# Patient Record
Sex: FEMALE | Race: WHITE | NOT HISPANIC OR LATINO | Employment: OTHER | URBAN - METROPOLITAN AREA
[De-identification: names, ages, dates, MRNs, and addresses within clinical notes are randomized per-mention and may not be internally consistent; named-entity substitution may affect disease eponyms.]

---

## 2017-04-27 RX ORDER — LEVOTHYROXINE SODIUM 25 MCG
TABLET ORAL
Qty: 30 TABLET | Refills: 3 | Status: SHIPPED | OUTPATIENT
Start: 2017-04-27 | End: 2017-09-08 | Stop reason: SDUPTHER

## 2017-05-23 ENCOUNTER — TELEPHONE (OUTPATIENT)
Dept: OBSTETRICS AND GYNECOLOGY | Age: 62
End: 2017-05-23

## 2017-05-23 ENCOUNTER — TRANSCRIBE ORDERS (OUTPATIENT)
Dept: OBSTETRICS AND GYNECOLOGY | Age: 62
End: 2017-05-23

## 2017-05-23 DIAGNOSIS — Z13.9 SCREENING: Primary | ICD-10-CM

## 2017-05-23 DIAGNOSIS — Z12.31 VISIT FOR SCREENING MAMMOGRAM: Primary | ICD-10-CM

## 2017-06-20 ENCOUNTER — HOSPITAL ENCOUNTER (OUTPATIENT)
Dept: MAMMOGRAPHY | Facility: HOSPITAL | Age: 62
Discharge: HOME | End: 2017-06-20
Attending: OBSTETRICS & GYNECOLOGY

## 2017-06-27 ENCOUNTER — HOSPITAL ENCOUNTER (OUTPATIENT)
Dept: MAMMOGRAPHY | Facility: HOSPITAL | Age: 62
Discharge: HOME | End: 2017-06-27
Attending: OBSTETRICS & GYNECOLOGY | Admitting: OBSTETRICS & GYNECOLOGY

## 2017-06-27 DIAGNOSIS — Z12.31 VISIT FOR SCREENING MAMMOGRAM: ICD-10-CM

## 2017-06-27 PROCEDURE — G0202 SCR MAMMO BI INCL CAD: HCPCS

## 2017-09-08 DIAGNOSIS — R74.8 ELEVATED LIVER ENZYMES: ICD-10-CM

## 2017-09-08 DIAGNOSIS — E03.9 ADULT HYPOTHYROIDISM: ICD-10-CM

## 2017-09-08 DIAGNOSIS — Z00.00 HEALTH CARE MAINTENANCE: Primary | ICD-10-CM

## 2017-09-08 RX ORDER — LEVOTHYROXINE SODIUM 25 MCG
TABLET ORAL
Qty: 30 TABLET | Refills: 3 | Status: SHIPPED | OUTPATIENT
Start: 2017-09-08 | End: 2017-09-18

## 2017-09-15 LAB
25(OH)D3+25(OH)D2 SERPL-MCNC: 54.6 NG/ML (ref 30–100)
ALBUMIN SERPL-MCNC: 4.5 G/DL (ref 3.5–5.2)
ALBUMIN/GLOB SERPL: 1.5 G/DL
ALP SERPL-CCNC: 76 U/L (ref 39–117)
ALT SERPL-CCNC: 22 U/L (ref 1–33)
APPEARANCE UR: CLEAR
AST SERPL-CCNC: 24 U/L (ref 1–32)
BACTERIA #/AREA URNS HPF: ABNORMAL /HPF
BACTERIA UR CULT: ABNORMAL
BACTERIA UR CULT: ABNORMAL
BASOPHILS # BLD AUTO: 0.02 10*3/MM3 (ref 0–0.2)
BASOPHILS NFR BLD AUTO: 0.4 % (ref 0–1.5)
BILIRUB SERPL-MCNC: 0.8 MG/DL (ref 0.1–1.2)
BILIRUB UR QL STRIP: NEGATIVE
BUN SERPL-MCNC: 20 MG/DL (ref 8–23)
BUN/CREAT SERPL: 26 (ref 7–25)
CALCIUM SERPL-MCNC: 10.3 MG/DL (ref 8.6–10.5)
CHLORIDE SERPL-SCNC: 99 MMOL/L (ref 98–107)
CHOLEST SERPL-MCNC: 238 MG/DL (ref 0–200)
CO2 SERPL-SCNC: 28.3 MMOL/L (ref 22–29)
COLOR UR: YELLOW
CREAT SERPL-MCNC: 0.77 MG/DL (ref 0.57–1)
CRYSTALS URNS MICRO: ABNORMAL
EOSINOPHIL # BLD AUTO: 0.18 10*3/MM3 (ref 0–0.7)
EOSINOPHIL NFR BLD AUTO: 3.4 % (ref 0.3–6.2)
EPI CELLS #/AREA URNS HPF: ABNORMAL /HPF
ERYTHROCYTE [DISTWIDTH] IN BLOOD BY AUTOMATED COUNT: 13.9 % (ref 11.7–13)
GLOBULIN SER CALC-MCNC: 3.1 GM/DL
GLUCOSE SERPL-MCNC: 99 MG/DL (ref 65–99)
GLUCOSE UR QL: NEGATIVE
HBA1C MFR BLD: 5 % (ref 4.8–5.6)
HCT VFR BLD AUTO: 43.8 % (ref 35.6–45.5)
HDLC SERPL-MCNC: 84 MG/DL (ref 40–60)
HGB BLD-MCNC: 14.1 G/DL (ref 11.9–15.5)
HGB UR QL STRIP: ABNORMAL
IMM GRANULOCYTES # BLD: 0 10*3/MM3 (ref 0–0.03)
IMM GRANULOCYTES NFR BLD: 0 % (ref 0–0.5)
KETONES UR QL STRIP: NEGATIVE
LDLC SERPL CALC-MCNC: 135 MG/DL (ref 0–100)
LEUKOCYTE ESTERASE UR QL STRIP: ABNORMAL
LYMPHOCYTES # BLD AUTO: 1.86 10*3/MM3 (ref 0.9–4.8)
LYMPHOCYTES NFR BLD AUTO: 35 % (ref 19.6–45.3)
MCH RBC QN AUTO: 30.4 PG (ref 26.9–32)
MCHC RBC AUTO-ENTMCNC: 32.2 G/DL (ref 32.4–36.3)
MCV RBC AUTO: 94.4 FL (ref 80.5–98.2)
MICRO URNS: ABNORMAL
MONOCYTES # BLD AUTO: 0.48 10*3/MM3 (ref 0.2–1.2)
MONOCYTES NFR BLD AUTO: 9 % (ref 5–12)
MUCOUS THREADS URNS QL MICRO: PRESENT /HPF
NEUTROPHILS # BLD AUTO: 2.77 10*3/MM3 (ref 1.9–8.1)
NEUTROPHILS NFR BLD AUTO: 52.2 % (ref 42.7–76)
NITRITE UR QL STRIP: NEGATIVE
OTHER ANTIBIOTIC SUSC ISLT: ABNORMAL
PH UR STRIP: 6 [PH] (ref 5–7.5)
PLATELET # BLD AUTO: 208 10*3/MM3 (ref 140–500)
POTASSIUM SERPL-SCNC: 4.3 MMOL/L (ref 3.5–5.2)
PROT SERPL-MCNC: 7.6 G/DL (ref 6–8.5)
PROT UR QL STRIP: NEGATIVE
RBC # BLD AUTO: 4.64 10*6/MM3 (ref 3.9–5.2)
RBC #/AREA URNS HPF: ABNORMAL /HPF
SODIUM SERPL-SCNC: 141 MMOL/L (ref 136–145)
SP GR UR: 1.02 (ref 1–1.03)
T4 FREE SERPL-MCNC: 1.28 NG/DL (ref 0.93–1.7)
TRIGL SERPL-MCNC: 97 MG/DL (ref 0–150)
TSH SERPL DL<=0.005 MIU/L-ACNC: 5.66 MIU/ML (ref 0.27–4.2)
UNIDENT CRYS URNS QL MICRO: PRESENT /LPF
URINALYSIS REFLEX: ABNORMAL
UROBILINOGEN UR STRIP-MCNC: 0.2 MG/DL (ref 0.2–1)
VLDLC SERPL CALC-MCNC: 19.4 MG/DL (ref 5–40)
WBC # BLD AUTO: 5.31 10*3/MM3 (ref 4.5–10.7)
WBC #/AREA URNS HPF: ABNORMAL /HPF

## 2017-09-18 ENCOUNTER — OFFICE VISIT (OUTPATIENT)
Dept: INTERNAL MEDICINE | Facility: CLINIC | Age: 62
End: 2017-09-18

## 2017-09-18 VITALS
DIASTOLIC BLOOD PRESSURE: 72 MMHG | HEIGHT: 68 IN | RESPIRATION RATE: 18 BRPM | WEIGHT: 155 LBS | SYSTOLIC BLOOD PRESSURE: 118 MMHG | HEART RATE: 69 BPM | OXYGEN SATURATION: 98 % | BODY MASS INDEX: 23.49 KG/M2

## 2017-09-18 DIAGNOSIS — Z00.00 HEALTH MAINTENANCE EXAMINATION: Primary | ICD-10-CM

## 2017-09-18 DIAGNOSIS — E03.9 ADULT HYPOTHYROIDISM: ICD-10-CM

## 2017-09-18 DIAGNOSIS — Z23 NEED FOR INFLUENZA VACCINATION: ICD-10-CM

## 2017-09-18 PROCEDURE — 90471 IMMUNIZATION ADMIN: CPT | Performed by: INTERNAL MEDICINE

## 2017-09-18 PROCEDURE — 90656 IIV3 VACC NO PRSV 0.5 ML IM: CPT | Performed by: INTERNAL MEDICINE

## 2017-09-18 PROCEDURE — 99396 PREV VISIT EST AGE 40-64: CPT | Performed by: INTERNAL MEDICINE

## 2017-09-18 RX ORDER — LEVOTHYROXINE SODIUM 50 MCG
50 TABLET ORAL DAILY
Qty: 90 TABLET | Refills: 3 | Status: SHIPPED | OUTPATIENT
Start: 2017-09-18 | End: 2018-09-30 | Stop reason: SDUPTHER

## 2017-09-18 NOTE — PROGRESS NOTES
"Chief Complaint  Charleen Rivera is a 62 y.o. female who presents for Annual Exam (CPE)  .    History of Present Illness   Charleen is here for routine CPE.  No new concerns.  She has been watching her three grandkids a lot.  She is definitely a little fatigued after some of the longer days but overall feels very good.    Mammo: 6/2017  Pap:10/2017 Dr. Pablo  DEXA: Dr. Pablo has done these  C-scope:6/2017    Exercise: \"not enough\"  She needs to do more    Immunization History   Administered Date(s) Administered   • Flu Vaccine Quad PF >18YRS 10/10/2016, 09/18/2017   • Tdap 06/01/2010   • Zoster 09/12/2016       Review of Systems   Constitution: Negative for chills, fever and malaise/fatigue.   HENT: Negative for headaches, hearing loss, hoarse voice and sore throat.    Eyes: Negative for blurred vision, double vision and visual disturbance.   Cardiovascular: Negative for chest pain, leg swelling and palpitations.   Respiratory: Negative for cough and shortness of breath.    Endocrine: Negative for polydipsia and polyuria.   Hematologic/Lymphatic: Does not bruise/bleed easily.   Skin: Negative for rash and suspicious lesions.   Musculoskeletal: Negative for arthritis and myalgias.   Gastrointestinal: Negative for abdominal pain, change in bowel habit, constipation, diarrhea, dysphagia, hematochezia, melena, nausea and vomiting.   Genitourinary: Negative for dysuria and hematuria.   Neurological: Negative for dizziness and light-headedness.   Psychiatric/Behavioral: Negative for depression. The patient is not nervous/anxious.        Patient Active Problem List   Diagnosis   • Adult hypothyroidism   • Airway hyperreactivity   • Abnormal finding on mammography   • Elevated liver enzymes       Past Medical History:   Diagnosis Date   • Asthma    • Cancer    • Carcinoma in situ of cervix     IN 1977   • Pap smear abnormality of cervix with ASCUS favoring benign     2007   • PMB (postmenopausal bleeding)    • Thyroid disease  " "      Past Surgical History:   Procedure Laterality Date   •  SECTION     • CHOLECYSTECTOMY     • COLONOSCOPY N/A 2016    Procedure: COLONOSCOPY TO CECUM;  Surgeon: Aisha Partida MD;  Location: [facility] ENDOSCOPY;  Service:    • DILATATION AND CURETTAGE     • DILATION AND CURETTAGE, DIAGNOSTIC / THERAPEUTIC      endometrial hyperplasia   • TONSILLECTOMY         Family History   Problem Relation Age of Onset   • Hypertension Mother    • Thyroid disease Mother    • Osteoporosis Mother    • Alcohol abuse Father    • Cancer Maternal Grandmother      breast cancer   • Cancer Cousin      breast cancer - two cousins       Social History     Social History   • Marital status:      Spouse name: N/A   • Number of children: 2   • Years of education: N/A     Occupational History   • retired principal      Social History Main Topics   • Smoking status: Never Smoker   • Smokeless tobacco: Never Used   • Alcohol use Yes      Comment: socially   • Drug use: No   • Sexual activity: Not on file     Other Topics Concern   • Not on file     Social History Narrative       Current Outpatient Prescriptions on File Prior to Visit   Medication Sig Dispense Refill   • Calcium-Phosphorus-Vitamin D (CITRACAL +D3) 250-107-500 MG-MG-UNIT chewable tablet Chew.     • Cholecalciferol (VITAMIN D) 1000 UNITS tablet Take  by mouth.     • [DISCONTINUED] SYNTHROID 25 MCG tablet TAKE 1 TABLET BY MOUTH EVERY MORNING 30 tablet 3     No current facility-administered medications on file prior to visit.        No Known Allergies    Vitals:    17 0829   BP: 118/72   Pulse: 69   Resp: 18   SpO2: 98%   Weight: 155 lb (70.3 kg)   Height: 68\" (172.7 cm)       Body mass index is 23.57 kg/(m^2).    Objective   Physical Exam   Constitutional: She is oriented to person, place, and time. She appears well-developed and well-nourished. No distress.   HENT:   Head: Normocephalic and atraumatic.   Nose: Nose normal.   Mouth/Throat: Oropharynx is " clear and moist.   Eyes: Conjunctivae and EOM are normal. Pupils are equal, round, and reactive to light. No scleral icterus.   Neck: Normal range of motion. Neck supple. No thyromegaly present.   Cardiovascular: Normal rate, regular rhythm and normal heart sounds.  Exam reveals no gallop and no friction rub.    No murmur heard.  Pulses:       Carotid pulses are 2+ on the right side, and 2+ on the left side.       Femoral pulses are 2+ on the right side, and 2+ on the left side.       Dorsalis pedis pulses are 2+ on the right side, and 2+ on the left side.        Posterior tibial pulses are 2+ on the right side, and 2+ on the left side.   Pulmonary/Chest: Effort normal and breath sounds normal. No respiratory distress. She has no wheezes. She has no rales. Right breast exhibits no mass and no nipple discharge. Left breast exhibits no mass and no nipple discharge.   Abdominal: Soft. Bowel sounds are normal. She exhibits no distension and no mass. There is no tenderness.   Musculoskeletal: Normal range of motion. She exhibits no edema.       Vascular Status -  Her exam exhibits no right foot edema. Her exam exhibits no left foot edema.   Skin Integrity  -  Her right foot skin is intact.     Charleen 's left foot skin is intact. .  Lymphadenopathy:     She has no cervical adenopathy.     She has no axillary adenopathy.        Right: No inguinal and no supraclavicular adenopathy present.        Left: No inguinal and no supraclavicular adenopathy present.   Neurological: She is alert and oriented to person, place, and time. She has normal reflexes. No cranial nerve deficit.   Skin: Skin is warm and dry.   Psychiatric: She has a normal mood and affect. Her speech is normal and behavior is normal. Judgment and thought content normal. Cognition and memory are normal.   Vitals reviewed.        Results for orders placed or performed in visit on 09/08/17   Comprehensive Metabolic Panel   Result Value Ref Range    Glucose 99 65 - 99  mg/dL    BUN 20 8 - 23 mg/dL    Creatinine 0.77 0.57 - 1.00 mg/dL    eGFR Non African Am 76 >60 mL/min/1.73    eGFR African Am 92 >60 mL/min/1.73    BUN/Creatinine Ratio 26.0 (H) 7.0 - 25.0    Sodium 141 136 - 145 mmol/L    Potassium 4.3 3.5 - 5.2 mmol/L    Chloride 99 98 - 107 mmol/L    Total CO2 28.3 22.0 - 29.0 mmol/L    Calcium 10.3 8.6 - 10.5 mg/dL    Total Protein 7.6 6.0 - 8.5 g/dL    Albumin 4.50 3.50 - 5.20 g/dL    Globulin 3.1 gm/dL    A/G Ratio 1.5 g/dL    Total Bilirubin 0.8 0.1 - 1.2 mg/dL    Alkaline Phosphatase 76 39 - 117 U/L    AST (SGOT) 24 1 - 32 U/L    ALT (SGPT) 22 1 - 33 U/L   Lipid Panel   Result Value Ref Range    Total Cholesterol 238 (H) 0 - 200 mg/dL    Triglycerides 97 0 - 150 mg/dL    HDL Cholesterol 84 (H) 40 - 60 mg/dL    VLDL Cholesterol 19.4 5 - 40 mg/dL    LDL Cholesterol  135 (H) 0 - 100 mg/dL   TSH Rfx On Abnormal To Free T4   Result Value Ref Range    TSH 5.66 (H) 0.27 - 4.2 mIU/mL   UA / M With / Rflx Culture([facility] ONLY)   Result Value Ref Range    Specific Gravity, UA 1.021 1.005 - 1.030    pH, UA 6.0 5.0 - 7.5    Color, UA Yellow Yellow    Appearance, UA Clear Clear    Leukocytes, UA Trace (A) Negative    Protein Negative Negative/Trace    Glucose, UA Negative Negative    Ketones Negative Negative    Blood, UA Trace (A) Negative    Bilirubin, UA Negative Negative    Urobilinogen, UA 0.2 0.2 - 1.0 mg/dL    Nitrite, UA Negative Negative    Microscopic Examination See below:     Urinalysis Reflex Comment    Vitamin D 25 Hydroxy   Result Value Ref Range    25 Hydroxy, Vitamin D 54.6 30.0 - 100.0 ng/mL   Hemoglobin A1c   Result Value Ref Range    Hemoglobin A1C 5.00 4.80 - 5.60 %   Microscopic Examination   Result Value Ref Range    WBC, UA 0-5 0 - 5 /hpf    RBC, UA 0-2 0 - 2 /hpf    Epithelial Cells (non renal) 0-10 0 - 10 /hpf    Crystals, UA Present (A) N/A /lpf    Crystal Type Calcium Oxalate N/A    Mucus, UA Present Not Estab. /hpf    Bacteria, UA Few None seen/Few /hpf    Urine Culture, Routine   Result Value Ref Range    Urine Culture Final report (A)     Result 1 Escherichia coli (A)     Susceptibility Testing Comment    T4F   Result Value Ref Range    Free T4 1.28 0.93 - 1.70 ng/dL   CBC & Differential   Result Value Ref Range    WBC 5.31 4.50 - 10.70 10*3/mm3    RBC 4.64 3.90 - 5.20 10*6/mm3    Hemoglobin 14.1 11.9 - 15.5 g/dL    Hematocrit 43.8 35.6 - 45.5 %    MCV 94.4 80.5 - 98.2 fL    MCH 30.4 26.9 - 32.0 pg    MCHC 32.2 (L) 32.4 - 36.3 g/dL    RDW 13.9 (H) 11.7 - 13.0 %    Platelets 208 140 - 500 10*3/mm3    Neutrophil Rel % 52.2 42.7 - 76.0 %    Lymphocyte Rel % 35.0 19.6 - 45.3 %    Monocyte Rel % 9.0 5.0 - 12.0 %    Eosinophil Rel % 3.4 0.3 - 6.2 %    Basophil Rel % 0.4 0.0 - 1.5 %    Neutrophils Absolute 2.77 1.90 - 8.10 10*3/mm3    Lymphocytes Absolute 1.86 0.90 - 4.80 10*3/mm3    Monocytes Absolute 0.48 0.20 - 1.20 10*3/mm3    Eosinophils Absolute 0.18 0.00 - 0.70 10*3/mm3    Basophils Absolute 0.02 0.00 - 0.20 10*3/mm3    Immature Granulocyte Rel % 0.0 0.0 - 0.5 %    Immature Grans Absolute 0.00 0.00 - 0.03 10*3/mm3       Assessment/Plan   Diagnoses and all orders for this visit:    Health maintenance examination  -     Flu Vaccine Quad PF >18YR (AFLURIA 3732-9919)    Adult hypothyroidism  -     SYNTHROID 50 MCG tablet; Take 1 tablet by mouth Daily.  -     TSH; Future    Need for influenza vaccination  -     Flu Vaccine Quad PF >18YR (AFLURIA 1176-3897)    Other orders  -     aspirin 81 MG tablet; Take 81 mg by mouth Daily.        Discussion/Summary  Charleen is here for routine CPE. She is a very healthy 63 y/o.  She is up to date on all health maintenance.  Her thyroid is a little under controlled.  I am increasing her synthroid from 25 to 50.  Will recheck in 6 weeks.  Encouraged to resume more regular exercise.  Of note, she had some throat tightness after her Shingles shot last year.  She used her inhaler and it all passed but she did want to mention that.  She had  her flu shot last year a month later without any reaction.  Flu shot given today.        Return in about 6 weeks (around 10/30/2017) for labs only; one year CPE with fasting labs prior.

## 2017-10-30 ENCOUNTER — RESULTS ENCOUNTER (OUTPATIENT)
Dept: INTERNAL MEDICINE | Facility: CLINIC | Age: 62
End: 2017-10-30

## 2017-10-30 DIAGNOSIS — E03.9 ADULT HYPOTHYROIDISM: ICD-10-CM

## 2017-11-04 LAB — TSH SERPL DL<=0.005 MIU/L-ACNC: 1.75 MIU/ML (ref 0.27–4.2)

## 2017-11-06 ENCOUNTER — TELEPHONE (OUTPATIENT)
Dept: INTERNAL MEDICINE | Facility: CLINIC | Age: 62
End: 2017-11-06

## 2017-11-06 NOTE — TELEPHONE ENCOUNTER
----- Message from Leda Martinez MD sent at 11/5/2017  8:14 PM EST -----  Please call - her thyroid look much better.

## 2018-01-08 ENCOUNTER — OFFICE VISIT (OUTPATIENT)
Dept: OBSTETRICS AND GYNECOLOGY | Age: 63
End: 2018-01-08

## 2018-01-08 VITALS
SYSTOLIC BLOOD PRESSURE: 108 MMHG | BODY MASS INDEX: 24.71 KG/M2 | DIASTOLIC BLOOD PRESSURE: 74 MMHG | HEIGHT: 68 IN | WEIGHT: 163 LBS

## 2018-01-08 DIAGNOSIS — Z01.419 ENCOUNTER FOR GYNECOLOGICAL EXAMINATION: Primary | ICD-10-CM

## 2018-01-08 PROCEDURE — 99396 PREV VISIT EST AGE 40-64: CPT | Performed by: OBSTETRICS & GYNECOLOGY

## 2018-01-08 NOTE — PROGRESS NOTES
"Subjective   Chief Complaint   Patient presents with   • Gynecologic Exam     Annual:last mammo 2017 @ [facility]      History of Present Illness    Charleen Rivera is a very pleasant  62 y.o. female who presents for annual exam.  , Mammo Exam 2017, Contraception none, Exercise increased but have recommended increasing again.    Obstetric History:  OB History      Para Term  AB Living    2 2 2       SAB TAB Ectopic Multiple Live Births                 Menstrual History:     No LMP recorded. Patient is postmenopausal.       Sexual History:       Past Medical History:   Diagnosis Date   • Asthma    • Cancer    • Carcinoma in situ of cervix     IN    • Pap smear abnormality of cervix with ASCUS favoring benign        • PMB (postmenopausal bleeding)    • Thyroid disease      Past Surgical History:   Procedure Laterality Date   •  SECTION     • CHOLECYSTECTOMY     • COLONOSCOPY N/A 2016    Procedure: COLONOSCOPY TO CECUM;  Surgeon: Aisha Partida MD;  Location: [facility] ENDOSCOPY;  Service:    • DILATATION AND CURETTAGE     • DILATION AND CURETTAGE, DIAGNOSTIC / THERAPEUTIC      endometrial hyperplasia   • TONSILLECTOMY         SOCIAL Hx:      The following portions of the patient's history were reviewed and updated as appropriate: allergies, current medications, past family history, past medical history, past social history, past surgical history and problem list.    Review of Systems        Except as outlined in history of physical illness, patient denies any changes in her GYN, , GI systems.  All other systems reviewed are negative         Objective   Physical Exam    /74  Ht 171.5 cm (67.5\")  Wt 73.9 kg (163 lb)  BMI 25.15 kg/m2    General: Patient is alert and oriented and appears overall healthy  Neck: Is supple without thyromegaly, no carotid bruits and no lymphadenopathy  Lungs: Clear bilaterally, no wheezing, rhonchi, or rales.  Respiratory rate is normal  Breast: Even " symmetrical, no lymphadenopathy, no retraction, no masses appreciated on either side  Heart: Regular rate and rhythm are appreciated, no murmurs or rubs are heard  Abdomen: Is soft, without organomegaly, bowel sounds are positive, there is no                                rebound or guarding and palpation does not produce any discomfort  Back: Nontender without CVA tenderness  Pelvic: External genitalia appear normal and consistent with mature female.  BUS normal              Urethra appears normal and without mass, bladder is nontender and without any               Masses.               Vagina is clean dry without discharge and appears adequately estrogenized, no               lesions or masses are present                         Cervix is noninflamed without discharge or lesions.  There is no cervical motion             tenderness.                Uterus is nonenlarged, without tenderness, and no masses or abnormalities are  present               Adnexa are non-enlarged, non tender               Rectal exam reveals adequate sphincter tone and no masses or lesions are                     appreciated on digital rectal examination.       Patient Active Problem List   Diagnosis   • Adult hypothyroidism   • Airway hyperreactivity   • Abnormal finding on mammography   • Elevated liver enzymes                Assessment/Plan   Charleen was seen today for gynecologic exam.    Diagnoses and all orders for this visit:    Encounter for gynecological examination  -     IGP, Apt HPV,rfx 16 / 18,45 - ThinPrep Vial, Cervix  -     Mammo Screening Bilateral With CAD; Future        Annual Well Woman Exam    Discussed today's findings and concerns with patient in detail.  Continue to recommend regular exercise to include cardiovascular and resistance training and breast self-exam. Wellness lab, mammography, & pap smear, in accordance with age guidelines.  Dietary and caloric recommendations were discussed.        All of the patient's  questions were addressed and answered, I have encouraged her to call for today's test results if she has not received them within 10 days.  Patient is advised to call with any change in her condition or with any other questions, otherwise return in 12 months for annual examination.

## 2018-01-10 ENCOUNTER — TRANSCRIBE ORDERS (OUTPATIENT)
Dept: ADMINISTRATIVE | Facility: HOSPITAL | Age: 63
End: 2018-01-10

## 2018-01-10 DIAGNOSIS — Z12.31 VISIT FOR SCREENING MAMMOGRAM: Primary | ICD-10-CM

## 2018-01-12 LAB
CYTOLOGIST CVX/VAG CYTO: NORMAL
CYTOLOGY CVX/VAG DOC THIN PREP: NORMAL
DX ICD CODE: NORMAL
HIV 1 & 2 AB SER-IMP: NORMAL
HPV I/H RISK 4 DNA CVX QL PROBE+SIG AMP: NEGATIVE
Lab: NORMAL
OTHER STN SPEC: NORMAL
PATH REPORT.FINAL DX SPEC: NORMAL
STAT OF ADQ CVX/VAG CYTO-IMP: NORMAL

## 2018-07-24 ENCOUNTER — HOSPITAL ENCOUNTER (OUTPATIENT)
Dept: MAMMOGRAPHY | Facility: HOSPITAL | Age: 63
Discharge: HOME | End: 2018-07-24
Attending: OBSTETRICS & GYNECOLOGY | Admitting: OBSTETRICS & GYNECOLOGY

## 2018-07-24 DIAGNOSIS — Z12.31 VISIT FOR SCREENING MAMMOGRAM: ICD-10-CM

## 2018-07-24 PROCEDURE — 77067 SCR MAMMO BI INCL CAD: CPT

## 2018-07-24 PROCEDURE — 77063 BREAST TOMOSYNTHESIS BI: CPT

## 2018-09-12 DIAGNOSIS — E03.9 ADULT HYPOTHYROIDISM: ICD-10-CM

## 2018-09-12 DIAGNOSIS — R74.8 ELEVATED LIVER ENZYMES: ICD-10-CM

## 2018-09-12 DIAGNOSIS — Z00.00 HEALTH CARE MAINTENANCE: Primary | ICD-10-CM

## 2018-09-14 LAB
25(OH)D3+25(OH)D2 SERPL-MCNC: 55.3 NG/ML (ref 30–100)
ALBUMIN SERPL-MCNC: 4.5 G/DL (ref 3.5–5.2)
ALBUMIN/GLOB SERPL: 1.9 G/DL
ALP SERPL-CCNC: 71 U/L (ref 39–117)
ALT SERPL-CCNC: 20 U/L (ref 1–33)
APPEARANCE UR: CLEAR
AST SERPL-CCNC: 18 U/L (ref 1–32)
BACTERIA #/AREA URNS HPF: ABNORMAL /HPF
BASOPHILS # BLD AUTO: 0.02 10*3/MM3 (ref 0–0.2)
BASOPHILS NFR BLD AUTO: 0.4 % (ref 0–1.5)
BILIRUB SERPL-MCNC: 0.4 MG/DL (ref 0.1–1.2)
BILIRUB UR QL STRIP: NEGATIVE
BUN SERPL-MCNC: 22 MG/DL (ref 8–23)
BUN/CREAT SERPL: 31.9 (ref 7–25)
CALCIUM SERPL-MCNC: 9.3 MG/DL (ref 8.6–10.5)
CASTS URNS MICRO: ABNORMAL
CASTS URNS QL MICRO: PRESENT /LPF
CHLORIDE SERPL-SCNC: 104 MMOL/L (ref 98–107)
CHOLEST SERPL-MCNC: 202 MG/DL (ref 0–200)
CO2 SERPL-SCNC: 28.3 MMOL/L (ref 22–29)
COLOR UR: YELLOW
CREAT SERPL-MCNC: 0.69 MG/DL (ref 0.57–1)
EOSINOPHIL # BLD AUTO: 0.36 10*3/MM3 (ref 0–0.7)
EOSINOPHIL NFR BLD AUTO: 6.5 % (ref 0.3–6.2)
EPI CELLS #/AREA URNS HPF: ABNORMAL /HPF
ERYTHROCYTE [DISTWIDTH] IN BLOOD BY AUTOMATED COUNT: 13.6 % (ref 11.7–13)
GLOBULIN SER CALC-MCNC: 2.4 GM/DL
GLUCOSE SERPL-MCNC: 93 MG/DL (ref 65–99)
GLUCOSE UR QL: NEGATIVE
HBA1C MFR BLD: 5.37 % (ref 4.8–5.6)
HCT VFR BLD AUTO: 41.4 % (ref 35.6–45.5)
HDLC SERPL-MCNC: 57 MG/DL (ref 40–60)
HGB BLD-MCNC: 13.3 G/DL (ref 11.9–15.5)
HGB UR QL STRIP: NEGATIVE
IMM GRANULOCYTES # BLD: 0.02 10*3/MM3 (ref 0–0.03)
IMM GRANULOCYTES NFR BLD: 0.4 % (ref 0–0.5)
KETONES UR QL STRIP: NEGATIVE
LDLC SERPL CALC-MCNC: 131 MG/DL (ref 0–100)
LEUKOCYTE ESTERASE UR QL STRIP: NEGATIVE
LYMPHOCYTES # BLD AUTO: 1.72 10*3/MM3 (ref 0.9–4.8)
LYMPHOCYTES NFR BLD AUTO: 31.2 % (ref 19.6–45.3)
MCH RBC QN AUTO: 29.2 PG (ref 26.9–32)
MCHC RBC AUTO-ENTMCNC: 32.1 G/DL (ref 32.4–36.3)
MCV RBC AUTO: 90.8 FL (ref 80.5–98.2)
MICRO URNS: NORMAL
MICRO URNS: NORMAL
MONOCYTES # BLD AUTO: 0.46 10*3/MM3 (ref 0.2–1.2)
MONOCYTES NFR BLD AUTO: 8.3 % (ref 5–12)
MUCOUS THREADS URNS QL MICRO: PRESENT /HPF
NEUTROPHILS # BLD AUTO: 2.96 10*3/MM3 (ref 1.9–8.1)
NEUTROPHILS NFR BLD AUTO: 53.6 % (ref 42.7–76)
NITRITE UR QL STRIP: NEGATIVE
PH UR STRIP: 5 [PH] (ref 5–7.5)
PLATELET # BLD AUTO: 228 10*3/MM3 (ref 140–500)
POTASSIUM SERPL-SCNC: 4.8 MMOL/L (ref 3.5–5.2)
PROT SERPL-MCNC: 6.9 G/DL (ref 6–8.5)
PROT UR QL STRIP: NEGATIVE
RBC # BLD AUTO: 4.56 10*6/MM3 (ref 3.9–5.2)
RBC #/AREA URNS HPF: ABNORMAL /HPF
SODIUM SERPL-SCNC: 143 MMOL/L (ref 136–145)
SP GR UR: 1.02 (ref 1–1.03)
TRIGL SERPL-MCNC: 71 MG/DL (ref 0–150)
TSH SERPL DL<=0.005 MIU/L-ACNC: 2.36 MIU/ML (ref 0.27–4.2)
URINALYSIS REFLEX: NORMAL
UROBILINOGEN UR STRIP-MCNC: 0.2 MG/DL (ref 0.2–1)
VLDLC SERPL CALC-MCNC: 14.2 MG/DL (ref 5–40)
WBC # BLD AUTO: 5.52 10*3/MM3 (ref 4.5–10.7)
WBC #/AREA URNS HPF: ABNORMAL /HPF

## 2018-09-20 ENCOUNTER — OFFICE VISIT (OUTPATIENT)
Dept: INTERNAL MEDICINE | Facility: CLINIC | Age: 63
End: 2018-09-20

## 2018-09-20 VITALS
WEIGHT: 166 LBS | OXYGEN SATURATION: 98 % | HEART RATE: 75 BPM | BODY MASS INDEX: 25.16 KG/M2 | HEIGHT: 68 IN | RESPIRATION RATE: 18 BRPM | SYSTOLIC BLOOD PRESSURE: 132 MMHG | DIASTOLIC BLOOD PRESSURE: 62 MMHG

## 2018-09-20 DIAGNOSIS — Z00.00 HEALTH MAINTENANCE EXAMINATION: Primary | ICD-10-CM

## 2018-09-20 DIAGNOSIS — E03.9 ADULT HYPOTHYROIDISM: ICD-10-CM

## 2018-09-20 PROCEDURE — 99396 PREV VISIT EST AGE 40-64: CPT | Performed by: INTERNAL MEDICINE

## 2018-09-20 RX ORDER — MONTELUKAST SODIUM 10 MG/1
TABLET ORAL
Refills: 11 | COMMUNITY
Start: 2018-09-01

## 2018-09-20 NOTE — PROGRESS NOTES
Chief Complaint  Charleen Rivera is a 63 y.o. female who presents for Annual Exam (CPE)  .    History of Present Illness   Charleen is here for routine CPE.  No new concerns.  She has gained her weight back.  Her daughter went through a divorce this summer and is living with them right now. This has been a little stressful.    Mammo:7/24/2018  Pap:1/9/2018  DEXA: , has been a couple years.   C-scope: 11/1/2016, repeat in ten year.     Exercise: Walks a couple times a week.     Immunization History   Administered Date(s) Administered   • Flu Vaccine Quad PF >18YRS 10/10/2016, 09/18/2017   • Tdap 06/01/2010   • Zostavax 09/12/2016       Review of Systems   Constitution: Negative for chills, fever and malaise/fatigue.   HENT: Negative for hearing loss, hoarse voice and sore throat.    Eyes: Positive for visual disturbance (cataracts). Negative for blurred vision and double vision.   Cardiovascular: Negative for chest pain, leg swelling and palpitations.   Respiratory: Negative for cough and shortness of breath.    Endocrine: Negative for polydipsia and polyuria.   Hematologic/Lymphatic: Does not bruise/bleed easily.   Skin: Negative for rash and suspicious lesions.   Musculoskeletal: Negative for arthritis and myalgias.   Gastrointestinal: Negative for bloating, abdominal pain, change in bowel habit, constipation, diarrhea, dysphagia, hematochezia, melena, nausea and vomiting.   Genitourinary: Negative for dysuria and hematuria.   Neurological: Negative for dizziness, headaches and light-headedness.   Psychiatric/Behavioral: Negative for depression. The patient is not nervous/anxious.        Patient Active Problem List   Diagnosis   • Adult hypothyroidism   • Airway hyperreactivity   • Abnormal finding on mammography   • Elevated liver enzymes       Past Medical History:   Diagnosis Date   • Asthma    • Cancer (CMS/HCC)    • Carcinoma in situ of cervix     IN 1977   • Pap smear abnormality of cervix with ASCUS favoring  "benign        • PMB (postmenopausal bleeding)    • Thyroid disease        Past Surgical History:   Procedure Laterality Date   •  SECTION     • CHOLECYSTECTOMY     • COLONOSCOPY N/A 2016    Procedure: COLONOSCOPY TO CECUM;  Surgeon: Aisha Partida MD;  Location: [facility] ENDOSCOPY;  Service:    • DILATATION AND CURETTAGE     • DILATION AND CURETTAGE, DIAGNOSTIC / THERAPEUTIC      endometrial hyperplasia   • TONSILLECTOMY         Family History   Problem Relation Age of Onset   • Hypertension Mother    • Thyroid disease Mother    • Osteoporosis Mother    • Alcohol abuse Father    • Cancer Maternal Grandmother         breast cancer   • Breast cancer Maternal Grandmother    • Cancer Cousin         breast cancer - two cousins   • Breast cancer Cousin        Social History     Social History   • Marital status:      Spouse name: N/A   • Number of children: 2   • Years of education: N/A     Occupational History   • retired principal      Social History Main Topics   • Smoking status: Never Smoker   • Smokeless tobacco: Never Used   • Alcohol use Yes      Comment: socially   • Drug use: No   • Sexual activity: Not on file     Other Topics Concern   • Not on file     Social History Narrative   • No narrative on file       Current Outpatient Prescriptions on File Prior to Visit   Medication Sig Dispense Refill   • aspirin 81 MG tablet Take 81 mg by mouth Daily.     • Calcium-Phosphorus-Vitamin D (CITRACAL +D3) 250-107-500 MG-MG-UNIT chewable tablet Chew.     • Cholecalciferol (VITAMIN D) 1000 UNITS tablet Take  by mouth.     • SYNTHROID 50 MCG tablet Take 1 tablet by mouth Daily. 90 tablet 3     No current facility-administered medications on file prior to visit.        Allergies   Allergen Reactions   • Zostavax [Zoster Vaccine Live] Itching       Vitals:    18 0930   BP: 132/62   Pulse: 75   Resp: 18   SpO2: 98%   Weight: 75.3 kg (166 lb)   Height: 171.5 cm (67.52\")       Body mass index is 25.6 " kg/m².    Objective   Physical Exam   Constitutional: She is oriented to person, place, and time. She appears well-developed and well-nourished. No distress.   HENT:   Head: Normocephalic and atraumatic.   Nose: Nose normal.   Mouth/Throat: Oropharynx is clear and moist.   Eyes: Pupils are equal, round, and reactive to light. Conjunctivae and EOM are normal. No scleral icterus.   Neck: Normal range of motion. Neck supple. No thyromegaly present.   Cardiovascular: Normal rate, regular rhythm and normal heart sounds.  Exam reveals no gallop and no friction rub.    No murmur heard.  Pulses:       Carotid pulses are 2+ on the right side, and 2+ on the left side.       Femoral pulses are 2+ on the right side, and 2+ on the left side.       Dorsalis pedis pulses are 2+ on the right side, and 2+ on the left side.        Posterior tibial pulses are 2+ on the right side, and 2+ on the left side.   Pulmonary/Chest: Effort normal and breath sounds normal. No respiratory distress. She has no wheezes. She has no rales. Right breast exhibits no mass and no nipple discharge. Left breast exhibits no mass and no nipple discharge.   Abdominal: Soft. Bowel sounds are normal. She exhibits no distension and no mass. There is no tenderness.   Musculoskeletal: Normal range of motion. She exhibits no edema.     Vascular Status -  Her right foot exhibits no edema. Her left foot exhibits no edema.  Skin Integrity  -  Her right foot skin is intact.Her left foot skin is intact..  Lymphadenopathy:     She has no cervical adenopathy.     She has no axillary adenopathy.        Right: No inguinal and no supraclavicular adenopathy present.        Left: No inguinal and no supraclavicular adenopathy present.   Neurological: She is alert and oriented to person, place, and time. She has normal reflexes. No cranial nerve deficit.   Skin: Skin is warm and dry.   Psychiatric: She has a normal mood and affect. Her speech is normal and behavior is normal.  Judgment and thought content normal. Cognition and memory are normal.   Vitals reviewed.        Results for orders placed or performed in visit on 09/12/18   Comprehensive Metabolic Panel   Result Value Ref Range    Glucose 93 65 - 99 mg/dL    BUN 22 8 - 23 mg/dL    Creatinine 0.69 0.57 - 1.00 mg/dL    eGFR Non African Am 86 >60 mL/min/1.73    eGFR African Am 104 >60 mL/min/1.73    BUN/Creatinine Ratio 31.9 (H) 7.0 - 25.0    Sodium 143 136 - 145 mmol/L    Potassium 4.8 3.5 - 5.2 mmol/L    Chloride 104 98 - 107 mmol/L    Total CO2 28.3 22.0 - 29.0 mmol/L    Calcium 9.3 8.6 - 10.5 mg/dL    Total Protein 6.9 6.0 - 8.5 g/dL    Albumin 4.50 3.50 - 5.20 g/dL    Globulin 2.4 gm/dL    A/G Ratio 1.9 g/dL    Total Bilirubin 0.4 0.1 - 1.2 mg/dL    Alkaline Phosphatase 71 39 - 117 U/L    AST (SGOT) 18 1 - 32 U/L    ALT (SGPT) 20 1 - 33 U/L   Lipid Panel   Result Value Ref Range    Total Cholesterol 202 (H) 0 - 200 mg/dL    Triglycerides 71 0 - 150 mg/dL    HDL Cholesterol 57 40 - 60 mg/dL    VLDL Cholesterol 14.2 5 - 40 mg/dL    LDL Cholesterol  131 (H) 0 - 100 mg/dL   TSH Rfx On Abnormal To Free T4   Result Value Ref Range    TSH 2.36 0.27 - 4.2 mIU/mL   Vitamin D 25 Hydroxy   Result Value Ref Range    25 Hydroxy, Vitamin D 55.3 30.0 - 100.0 ng/ml   Hemoglobin A1c   Result Value Ref Range    Hemoglobin A1C 5.37 4.80 - 5.60 %   Urinalysis With Culture If Indicated - Urine, Clean Catch   Result Value Ref Range    Specific Gravity, UA 1.024 1.005 - 1.030    pH, UA 5.0 5.0 - 7.5    Color, UA Yellow Yellow    Appearance, UA Clear Clear    Leukocytes, UA Negative Negative    Protein Negative Negative/Trace    Glucose, UA Negative Negative    Ketones Negative Negative    Blood, UA Negative Negative    Bilirubin, UA Negative Negative    Urobilinogen, UA 0.2 0.2 - 1.0 mg/dL    Nitrite, UA Negative Negative    Microscopic Examination Comment     MICROSCOPIC EXAMINATION See below:     Urinalysis Reflex Comment    Microscopic Examination    Result Value Ref Range    WBC, UA 0-5 0 - 5 /hpf    RBC, UA 0-2 0 - 2 /hpf    Epithelial Cells (non renal) 0-10 0 - 10 /hpf    Casts Present (A) None seen /lpf    Cast Type Hyaline casts N/A    Mucus, UA Present Not Estab. /hpf    Bacteria, UA Few None seen/Few /hpf   CBC & Differential   Result Value Ref Range    WBC 5.52 4.50 - 10.70 10*3/mm3    RBC 4.56 3.90 - 5.20 10*6/mm3    Hemoglobin 13.3 11.9 - 15.5 g/dL    Hematocrit 41.4 35.6 - 45.5 %    MCV 90.8 80.5 - 98.2 fL    MCH 29.2 26.9 - 32.0 pg    MCHC 32.1 (L) 32.4 - 36.3 g/dL    RDW 13.6 (H) 11.7 - 13.0 %    Platelets 228 140 - 500 10*3/mm3    Neutrophil Rel % 53.6 42.7 - 76.0 %    Lymphocyte Rel % 31.2 19.6 - 45.3 %    Monocyte Rel % 8.3 5.0 - 12.0 %    Eosinophil Rel % 6.5 (H) 0.3 - 6.2 %    Basophil Rel % 0.4 0.0 - 1.5 %    Neutrophils Absolute 2.96 1.90 - 8.10 10*3/mm3    Lymphocytes Absolute 1.72 0.90 - 4.80 10*3/mm3    Monocytes Absolute 0.46 0.20 - 1.20 10*3/mm3    Eosinophils Absolute 0.36 0.00 - 0.70 10*3/mm3    Basophils Absolute 0.02 0.00 - 0.20 10*3/mm3    Immature Granulocyte Rel % 0.4 0.0 - 0.5 %    Immature Grans Absolute 0.02 0.00 - 0.03 10*3/mm3       Assessment/Plan   Diagnoses and all orders for this visit:    Health maintenance examination    Adult hypothyroidism    Other orders  -     montelukast (SINGULAIR) 10 MG tablet; TK 1 T PO HS  -     PROAIR  (90 Base) MCG/ACT inhaler; INL 2 PFS PO Q 4 TO 6 H PRN        Discussion/Summary  Charleen is here for routine CPE.  She is up to date on health maintenance.  She declines the new shingles vaccine based on her severe reaction to the zostavax.  Even though it's a different vaccine, she does not want to chance it.  She did fine with the flu shot last year.  She will get this at her local pharmacy. Thyroid is well controlled.  Work on diet and exercise and weight loss again.        Return in about 1 year (around 9/20/2019) for Annual physical, with fasting labs prior.

## 2018-09-30 DIAGNOSIS — E03.9 ADULT HYPOTHYROIDISM: ICD-10-CM

## 2018-10-01 RX ORDER — LEVOTHYROXINE SODIUM 50 MCG
50 TABLET ORAL DAILY
Qty: 90 TABLET | Refills: 1 | Status: SHIPPED | OUTPATIENT
Start: 2018-10-01 | End: 2019-05-06 | Stop reason: SDUPTHER

## 2019-05-06 DIAGNOSIS — E03.9 ADULT HYPOTHYROIDISM: ICD-10-CM

## 2019-05-06 RX ORDER — LEVOTHYROXINE SODIUM 50 MCG
50 TABLET ORAL DAILY
Qty: 90 TABLET | Refills: 0 | Status: SHIPPED | OUTPATIENT
Start: 2019-05-06 | End: 2019-05-06 | Stop reason: SDUPTHER

## 2019-05-06 RX ORDER — LEVOTHYROXINE SODIUM 50 MCG
50 TABLET ORAL DAILY
Qty: 90 TABLET | Refills: 1 | Status: SHIPPED | OUTPATIENT
Start: 2019-05-06 | End: 2021-11-11

## 2019-06-27 ENCOUNTER — TRANSCRIBE ORDERS (OUTPATIENT)
Dept: ADMINISTRATIVE | Facility: HOSPITAL | Age: 64
End: 2019-06-27

## 2019-06-27 DIAGNOSIS — Z12.39 SCREENING BREAST EXAMINATION: Primary | ICD-10-CM

## 2019-07-25 ENCOUNTER — HOSPITAL ENCOUNTER (OUTPATIENT)
Dept: MAMMOGRAPHY | Facility: HOSPITAL | Age: 64
Discharge: HOME | End: 2019-07-25
Admitting: OBSTETRICS & GYNECOLOGY

## 2019-07-25 DIAGNOSIS — Z12.39 SCREENING BREAST EXAMINATION: ICD-10-CM

## 2019-07-25 PROCEDURE — 77067 SCR MAMMO BI INCL CAD: CPT

## 2019-07-25 PROCEDURE — 77063 BREAST TOMOSYNTHESIS BI: CPT

## 2019-10-17 DIAGNOSIS — Z00.00 HEALTHCARE MAINTENANCE: Primary | ICD-10-CM

## 2019-10-17 DIAGNOSIS — E03.9 ADULT HYPOTHYROIDISM: ICD-10-CM

## 2019-10-17 DIAGNOSIS — E78.89 LIPIDS ABNORMAL: ICD-10-CM

## 2019-10-17 DIAGNOSIS — R74.8 ELEVATED LIVER ENZYMES: ICD-10-CM

## 2019-10-22 LAB
ALBUMIN SERPL-MCNC: 4.4 G/DL (ref 3.5–5.2)
ALBUMIN/GLOB SERPL: 1.8 G/DL
ALP SERPL-CCNC: 73 U/L (ref 39–117)
ALT SERPL-CCNC: 21 U/L (ref 1–33)
APPEARANCE UR: CLEAR
AST SERPL-CCNC: 19 U/L (ref 1–32)
BACTERIA #/AREA URNS HPF: ABNORMAL /HPF
BASOPHILS # BLD AUTO: 0.03 10*3/MM3 (ref 0–0.2)
BASOPHILS NFR BLD AUTO: 0.5 % (ref 0–1.5)
BILIRUB SERPL-MCNC: 0.5 MG/DL (ref 0.2–1.2)
BILIRUB UR QL STRIP: NEGATIVE
BUN SERPL-MCNC: 15 MG/DL (ref 8–23)
BUN/CREAT SERPL: 22.7 (ref 7–25)
CALCIUM SERPL-MCNC: 9.1 MG/DL (ref 8.6–10.5)
CHLORIDE SERPL-SCNC: 104 MMOL/L (ref 98–107)
CHOLEST SERPL-MCNC: 216 MG/DL (ref 0–200)
CO2 SERPL-SCNC: 27.8 MMOL/L (ref 22–29)
COLOR UR: YELLOW
CREAT SERPL-MCNC: 0.66 MG/DL (ref 0.57–1)
CRYSTALS URNS MICRO: ABNORMAL
EOSINOPHIL # BLD AUTO: 0.17 10*3/MM3 (ref 0–0.4)
EOSINOPHIL NFR BLD AUTO: 2.9 % (ref 0.3–6.2)
EPI CELLS #/AREA URNS HPF: ABNORMAL /HPF
ERYTHROCYTE [DISTWIDTH] IN BLOOD BY AUTOMATED COUNT: 12.2 % (ref 12.3–15.4)
GLOBULIN SER CALC-MCNC: 2.4 GM/DL
GLUCOSE SERPL-MCNC: 95 MG/DL (ref 65–99)
GLUCOSE UR QL: NEGATIVE
HCT VFR BLD AUTO: 38.4 % (ref 34–46.6)
HDLC SERPL-MCNC: 61 MG/DL (ref 40–60)
HGB BLD-MCNC: 13.1 G/DL (ref 12–15.9)
HGB UR QL STRIP: NEGATIVE
IMM GRANULOCYTES # BLD AUTO: 0.02 10*3/MM3 (ref 0–0.05)
IMM GRANULOCYTES NFR BLD AUTO: 0.3 % (ref 0–0.5)
KETONES UR QL STRIP: NEGATIVE
LDLC SERPL CALC-MCNC: 133 MG/DL (ref 0–100)
LEUKOCYTE ESTERASE UR QL STRIP: NEGATIVE
LYMPHOCYTES # BLD AUTO: 1.81 10*3/MM3 (ref 0.7–3.1)
LYMPHOCYTES NFR BLD AUTO: 30.5 % (ref 19.6–45.3)
MCH RBC QN AUTO: 30.3 PG (ref 26.6–33)
MCHC RBC AUTO-ENTMCNC: 34.1 G/DL (ref 31.5–35.7)
MCV RBC AUTO: 88.7 FL (ref 79–97)
MICRO URNS: NORMAL
MICRO URNS: NORMAL
MONOCYTES # BLD AUTO: 0.64 10*3/MM3 (ref 0.1–0.9)
MONOCYTES NFR BLD AUTO: 10.8 % (ref 5–12)
MUCOUS THREADS URNS QL MICRO: PRESENT /HPF
NEUTROPHILS # BLD AUTO: 3.26 10*3/MM3 (ref 1.7–7)
NEUTROPHILS NFR BLD AUTO: 55 % (ref 42.7–76)
NITRITE UR QL STRIP: NEGATIVE
NRBC BLD AUTO-RTO: 0 /100 WBC (ref 0–0.2)
PH UR STRIP: 5 [PH] (ref 5–7.5)
PLATELET # BLD AUTO: 186 10*3/MM3 (ref 140–450)
POTASSIUM SERPL-SCNC: 3.7 MMOL/L (ref 3.5–5.2)
PROT SERPL-MCNC: 6.8 G/DL (ref 6–8.5)
PROT UR QL STRIP: NEGATIVE
RBC # BLD AUTO: 4.33 10*6/MM3 (ref 3.77–5.28)
RBC #/AREA URNS HPF: ABNORMAL /HPF
SODIUM SERPL-SCNC: 145 MMOL/L (ref 136–145)
SP GR UR: 1.03 (ref 1–1.03)
T4 FREE SERPL-MCNC: 1.1 NG/DL (ref 0.93–1.7)
TRIGL SERPL-MCNC: 112 MG/DL (ref 0–150)
TSH SERPL DL<=0.005 MIU/L-ACNC: 2.72 UIU/ML (ref 0.27–4.2)
UNIDENT CRYS URNS QL MICRO: PRESENT /LPF
URINALYSIS REFLEX: NORMAL
UROBILINOGEN UR STRIP-MCNC: 0.2 MG/DL (ref 0.2–1)
VLDLC SERPL CALC-MCNC: 22.4 MG/DL
WBC # BLD AUTO: 5.93 10*3/MM3 (ref 3.4–10.8)
WBC #/AREA URNS HPF: ABNORMAL /HPF

## 2019-10-29 ENCOUNTER — OFFICE VISIT (OUTPATIENT)
Dept: INTERNAL MEDICINE | Facility: CLINIC | Age: 64
End: 2019-10-29

## 2019-10-29 VITALS
WEIGHT: 168 LBS | OXYGEN SATURATION: 97 % | RESPIRATION RATE: 12 BRPM | HEIGHT: 67 IN | SYSTOLIC BLOOD PRESSURE: 130 MMHG | HEART RATE: 102 BPM | TEMPERATURE: 98.7 F | BODY MASS INDEX: 26.37 KG/M2 | DIASTOLIC BLOOD PRESSURE: 80 MMHG

## 2019-10-29 DIAGNOSIS — Z00.00 HEALTHCARE MAINTENANCE: Primary | ICD-10-CM

## 2019-10-29 DIAGNOSIS — Z23 ENCOUNTER FOR IMMUNIZATION: ICD-10-CM

## 2019-10-29 DIAGNOSIS — J30.89 ENVIRONMENTAL AND SEASONAL ALLERGIES: ICD-10-CM

## 2019-10-29 DIAGNOSIS — E03.9 ADULT HYPOTHYROIDISM: ICD-10-CM

## 2019-10-29 DIAGNOSIS — J45.30 MILD PERSISTENT ASTHMA WITHOUT COMPLICATION: ICD-10-CM

## 2019-10-29 DIAGNOSIS — E66.3 OVERWEIGHT (BMI 25.0-29.9): ICD-10-CM

## 2019-10-29 PROCEDURE — G0009 ADMIN PNEUMOCOCCAL VACCINE: HCPCS | Performed by: INTERNAL MEDICINE

## 2019-10-29 PROCEDURE — G0403 EKG FOR INITIAL PREVENT EXAM: HCPCS | Performed by: INTERNAL MEDICINE

## 2019-10-29 PROCEDURE — G0402 INITIAL PREVENTIVE EXAM: HCPCS | Performed by: INTERNAL MEDICINE

## 2019-10-29 PROCEDURE — 90653 IIV ADJUVANT VACCINE IM: CPT | Performed by: INTERNAL MEDICINE

## 2019-10-29 PROCEDURE — 90670 PCV13 VACCINE IM: CPT | Performed by: INTERNAL MEDICINE

## 2019-10-29 PROCEDURE — G0008 ADMIN INFLUENZA VIRUS VAC: HCPCS | Performed by: INTERNAL MEDICINE

## 2019-10-29 NOTE — PROGRESS NOTES
"Subjective       Chief Complaint   Patient presents with   • Annual Exam     welcome to medicare       HPI:  Radha Rivera is a 64 y.o. female presents for WMCPE, and review of medical issues, transfer from Dr. Martinez :   1. Asthma and Allergies - long hx, but adult only.  Thinks started when living here and developed allergy when got cat in past.  Recently had flare and had to placed on steroids recently.  Is planning to start the shots. Is on Singulair seasonally, usually Fall and Spring. Will add Xyzal in the season only.  \"I dont like to take meds, I just dont\". Uses albuterol at times, last use a few minutes ago.  In season will use quite often but no use out of season.  Never been placed on daily inhaler. [facility]es [facility], Dr. Quinones.  2.  Hypothyroidism - dx'd 'several years ago'.  Not sure etiology. Was on low dose and has had to take meds.  Takes med daily on empty stomach.   3. Carcinoma in situ of cervix - in 1977, s/p D & C and then had no recurrence.  Never had XIN.  Sees Dr. Pablo annually for Pap smears. Last Pap was 1/2019.    4. Overweight - had lost weight in past with restrictive diet.  Will gain weight easily.  Is not exercising enough. Walk some only.  Is very active but \"Not intentional exercise\".       The following portions of the patient's history were reviewed and updated as appropriate: allergies, current medications, past family history, past medical history, past social history, past surgical history and problem list.    Review of Systems   Constitution: Positive for weight gain. Negative for chills, fever and malaise/fatigue.   HENT: Negative for congestion, hearing loss, odynophagia and sore throat.    Eyes: Negative for discharge, double vision, pain and redness.        Last eye exam 6/2019; cataract surgery over summer; wears readers.    Cardiovascular: Positive for palpitations (rare at night, fleeting. ). Negative for chest pain, dyspnea on exertion, irregular " heartbeat, leg swelling, near-syncope and syncope.   Respiratory: Negative for cough (mild, allergy) and shortness of breath.    Endocrine: Negative for polydipsia, polyphagia and polyuria.   Hematologic/Lymphatic: Negative for bleeding problem. Does not bruise/bleed easily.   Skin: Negative for rash and suspicious lesions.        Sees derm annually, Dr. Rangel     Musculoskeletal: Negative for joint pain, joint swelling, muscle cramps, muscle weakness and myalgias.   Gastrointestinal: Negative for constipation, diarrhea, dysphagia, heartburn, nausea and vomiting.   Genitourinary: Negative for bladder incontinence, dysuria, frequency, hematuria and hesitancy.   Neurological: Negative for dizziness, headaches and light-headedness.   Psychiatric/Behavioral: Negative for depression. The patient does not have insomnia and is not nervous/anxious.    Allergic/Immunologic: Positive for environmental allergies. Negative for persistent infections.       Patient Care Team:  Paolo Dc MD as PCP - General (Internal Medicine)    Recent Hospitalizations: No recent hospitalization(s).    Depression Screen:   PHQ-2/PHQ-9 Depression Screening 10/29/2019   Little interest or pleasure in doing things 0   Feeling down, depressed, or hopeless 0   Trouble falling or staying asleep, or sleeping too much 0   Feeling tired or having little energy 0   Poor appetite or overeating 0   Feeling bad about yourself - or that you are a failure or have let yourself or your family down 0   Trouble concentrating on things, such as reading the newspaper or watching television 0   Moving or speaking so slowly that other people could have noticed. Or the opposite - being so fidgety or restless that you have been moving around a lot more than usual 0   Thoughts that you would be better off dead, or of hurting yourself in some way 0   Total Score 0   If you checked off any problems, how difficult have these problems made it for you to do your work,  "take care of things at home, or get along with other people? Not difficult at all       Functional and Cognitive Screening:  Functional & Cognitive Status 10/29/2019   Do you have difficulty preparing food and eating? No   Do you have difficulty bathing yourself, getting dressed or grooming yourself? No   Do you have difficulty using the toilet? No   Do you have difficulty moving around from place to place? No   Do you have trouble with steps or getting out of a bed or a chair? No   Current Diet Well Balanced Diet   Dental Exam Up to date   Eye Exam Up to date   Exercise (times per week) 1 times per week   Current Exercise Activities Include Walking   Do you need help using the phone?  No   Are you deaf or do you have serious difficulty hearing?  No   Do you need help with transportation? No   Do you need help shopping? No   Do you need help preparing meals?  No   Do you need help with housework?  No   Do you need help with laundry? No   Do you need help taking your medications? No   Do you need help managing money? No   Do you ever drive or ride in a car without wearing a seat belt? No   Have you felt unusual stress, anger or loneliness in the last month? No   Who do you live with? Spouse   If you need help, do you have trouble finding someone available to you? No   Have you been bothered in the last four weeks by sexual problems? No   Do you have difficulty concentrating, remembering or making decisions? No       Does the patient have evidence of cognitive impairment? no    Does not need ASA but is currently taking (advised patient that ASA is not indicated and patient chooses to stop it)    Vitals:    10/29/19 1342   BP: 130/80   Pulse: 102   Resp: 12   Temp: 98.7 °F (37.1 °C)   SpO2: 97%   Weight: 76.2 kg (168 lb)   Height: 170.2 cm (67\")   PainSc: 0-No pain       Body mass index is 26.31 kg/m².    Visual Acuity:   Visual Acuity Screening    Right eye Left eye Both eyes   Without correction: 20/25 20/20 20/20 "   With correction:          Advanced Care Planning:  Patient has an advance directive - a copy has not been provided. Have asked the patient to send this to us to add to record    Objective     Physical Exam   Constitutional: She is oriented to person, place, and time. She appears well-developed and well-nourished. No distress.   HENT:   Head: Normocephalic and atraumatic.   Right Ear: Hearing, tympanic membrane, external ear and ear canal normal.   Left Ear: Hearing, tympanic membrane, external ear and ear canal normal.   Nose: Nose normal.   Mouth/Throat: Uvula is midline, oropharynx is clear and moist and mucous membranes are normal. No oropharyngeal exudate.   Eyes: Conjunctivae, EOM and lids are normal. Pupils are equal, round, and reactive to light. Right eye exhibits no discharge. Left eye exhibits no discharge. No scleral icterus.   Neck: Trachea normal and full passive range of motion without pain. Carotid bruit is not present. No thyroid mass and no thyromegaly present.   Cardiovascular: Normal rate, regular rhythm, S1 normal, S2 normal, normal heart sounds and intact distal pulses. Exam reveals no gallop and no friction rub.   No murmur heard.  Pulses:       Radial pulses are 2+ on the right side, and 2+ on the left side.        Dorsalis pedis pulses are 2+ on the right side, and 2+ on the left side.        Posterior tibial pulses are 2+ on the right side, and 2+ on the left side.   Pulmonary/Chest: Effort normal and breath sounds normal. No respiratory distress. She has no decreased breath sounds. She has no wheezes. She has no rhonchi. She has no rales.   Abdominal: Soft. Normal appearance and bowel sounds are normal. She exhibits no distension and no mass. There is no hepatosplenomegaly. There is no tenderness. There is no rebound and no guarding.   Musculoskeletal: Normal range of motion. She exhibits no edema.     Vascular Status -  Her right foot exhibits normal foot vasculature  and no edema. Her  left foot exhibits normal foot vasculature  and no edema.  Lymphadenopathy:     She has no cervical adenopathy.     She has no axillary adenopathy.        Right: No inguinal adenopathy present.        Left: No inguinal adenopathy present.   Neurological: She is alert and oriented to person, place, and time. She has normal strength and normal reflexes. She displays no tremor. No cranial nerve deficit or sensory deficit. She exhibits normal muscle tone. Gait normal.   Skin: Skin is warm and dry. No rash noted.   Psychiatric: She has a normal mood and affect. Her behavior is normal. Thought content normal. Cognition and memory are normal.   Vitals reviewed.      ECG 12 Lead  Date/Time: 10/29/2019 3:10 PM  Performed by: Paolo Dc MD  Authorized by: Paolo Dc MD   Comparison: not compared with previous ECG   Previous ECG: no previous ECG available  Rhythm: sinus rhythm  Rate: normal  BPM: 95  Conduction: conduction normal  ST Segments: ST segments normal  T Waves: T waves normal  QRS axis: normal    Clinical impression: normal ECG  Comments: QTc - 437  Indication - WMCPE          Assessment/Plan   Problems Addressed this Visit     Adult hypothyroidism    Relevant Orders    Thyroid Peroxidase Antibody    Mild persistent asthma without complication    Environmental and seasonal allergies    Overweight (BMI 25.0-29.9)      Other Visit Diagnoses     Healthcare maintenance    -  Primary    Relevant Orders    Pneumococcal Conjugate Vaccine 13-Valent All (Completed)    Thyroid Peroxidase Antibody    Encounter for immunization         Relevant Orders    Pneumococcal Conjugate Vaccine 13-Valent All (Completed)              Diagnoses and all orders for this visit:    Healthcare maintenance  -     Pneumococcal Conjugate Vaccine 13-Valent All  -     Thyroid Peroxidase Antibody    Mild persistent asthma without complication    Adult hypothyroidism  -     Thyroid Peroxidase Antibody    Environmental and seasonal  allergies    Overweight (BMI 25.0-29.9)    Encounter for immunization   -     Pneumococcal Conjugate Vaccine 13-Valent All    Other orders  -     Flu[employer] High Dose 65YR+ ([id number])  -     ECG 12 Lead        Rahda Rivera is a 64 y.o. female presents for WMCPE, and review of medical issues, transfer from Dr. Martinez :   1. Asthma and Allergies - long hx, adult onset only. Strong seasonal component to both issues.  Plans immunotherapy.  On Singulair and Xyzal daily in season.   2.  Hypothyroidism - unclear etiology, info not in chart.  TSH reasonable today. Check anti-TPO Ab today and will f/u with pt on plan.      3. Hx of Carcinoma in situ of cervix - in 1977, s/p D & C,  no recurrence. Sees Dr. Pablo annually for Pap smears, last 1/2019.    4. Overweight - off exercise routine and diet is variable. D/W pt need for TLC diet and regular exercise.   5. HM - labs d/w pt; Flu/ Prevnar - today; Tdap/ Zostavax - UTD; Hep A and Shingrix - at pharmacy (reassured pt on new shingles vaccine with past Zostavax reaction); C-scope (-) 11/2016 --> 10 years; Pap/ Mammo UTD 1/2019; DEXA due next year at Gyn (normal in past); Hep C Ab (-) 9/2016; add more exercise; Preventative care plan provided to pt at end of visit      Return in about 1 year (around 10/29/2020) for Annual physical.          Current Outpatient Medications:   •  Calcium-Phosphorus-Vitamin D (CITRACAL +D3) 250-107-500 MG-MG-UNIT chewable tablet, Chew., Disp: , Rfl:   •  Cholecalciferol (VITAMIN D) 1000 UNITS tablet, Take  by mouth., Disp: , Rfl:   •  montelukast (SINGULAIR) 10 MG tablet, TK 1 T PO HS, Disp: , Rfl: 11  •  PROAIR  (90 Base) MCG/ACT inhaler, INL 2 PFS PO Q 4 TO 6 H PRN, Disp: , Rfl: 11  •  SYNTHROID 50 MCG tablet, Take 1 tablet by mouth Daily., Disp: 90 tablet, Rfl: 1

## 2019-10-29 NOTE — PATIENT INSTRUCTIONS
Shingrix (new shingles shot; 2 shot series) check at pharmacy  Hepatitis A (2 shot series) check at pharmacy    Medicare Wellness  Personal Prevention Plan of Service     Date of Office Visit:  10/29/2019  Encounter Provider:  Paolo Dc MD  Place of Service:  [facility] INTERNAL MEDICINE  Patient Name: Radha Rivera  :  1955    As part of the Medicare Wellness portion of your visit today, we are providing you with this personalized preventive plan of services (PPPS). This plan is based upon recommendations of the [place] Preventive Services Task Force (USPSTF) and the Advisory Committee on Immunization Practices (ACIP).    This lists the preventive care services that should be considered, and provides dates of when you are due. Items listed as completed are up-to-date and do not require any further intervention.    Health Maintenance   Topic Date Due   • TDAP/TD VACCINES (2 - Td) 2020   • MAMMOGRAM  2020   • MEDICARE ANNUAL WELLNESS  10/29/2020   • PNEUMOCOCCAL VACCINES (65+ LOW/MEDIUM RISK) (2 of 2 - PPSV23) 10/29/2020   • PAP SMEAR  2021   • COLONOSCOPY  2026   • HEPATITIS C SCREENING  Completed   • INFLUENZA VACCINE  Completed   • ZOSTER VACCINE  Discontinued       Orders Placed This Encounter   Procedures   • Flu[employer] High Dose 65YR+ ([id number])   • Pneumococcal Conjugate Vaccine 13-Valent All   • Thyroid Peroxidase Antibody       Return in about 1 year (around 10/29/2020) for Annual physical.

## 2019-10-30 LAB — THYROPEROXIDASE AB SERPL-ACNC: 14 IU/ML (ref 0–34)

## 2019-12-17 DIAGNOSIS — E03.9 ADULT HYPOTHYROIDISM: Primary | ICD-10-CM

## 2019-12-23 LAB
T4 FREE SERPL-MCNC: 1 NG/DL (ref 0.93–1.7)
TSH SERPL DL<=0.005 MIU/L-ACNC: 2.87 UIU/ML (ref 0.27–4.2)

## 2020-06-01 ENCOUNTER — TRANSCRIBE ORDERS (OUTPATIENT)
Dept: ADMINISTRATIVE | Facility: HOSPITAL | Age: 65
End: 2020-06-01

## 2020-06-01 DIAGNOSIS — Z12.31 VISIT FOR SCREENING MAMMOGRAM: Primary | ICD-10-CM

## 2020-08-04 ENCOUNTER — HOSPITAL ENCOUNTER (OUTPATIENT)
Dept: MAMMOGRAPHY | Facility: HOSPITAL | Age: 65
Discharge: HOME | End: 2020-08-04
Admitting: OBSTETRICS & GYNECOLOGY

## 2020-08-04 DIAGNOSIS — Z12.31 VISIT FOR SCREENING MAMMOGRAM: ICD-10-CM

## 2020-08-04 PROCEDURE — 77063 BREAST TOMOSYNTHESIS BI: CPT

## 2020-08-04 PROCEDURE — 77067 SCR MAMMO BI INCL CAD: CPT

## 2020-10-21 DIAGNOSIS — Z00.00 HEALTHCARE MAINTENANCE: Primary | ICD-10-CM

## 2020-10-21 DIAGNOSIS — E03.9 ADULT HYPOTHYROIDISM: ICD-10-CM

## 2020-10-21 DIAGNOSIS — J45.30 MILD PERSISTENT ASTHMA WITHOUT COMPLICATION: ICD-10-CM

## 2020-10-21 DIAGNOSIS — E66.3 OVERWEIGHT (BMI 25.0-29.9): ICD-10-CM

## 2020-11-03 LAB
ALBUMIN SERPL-MCNC: 4.5 G/DL (ref 3.5–5.2)
ALBUMIN/GLOB SERPL: 1.8 G/DL
ALP SERPL-CCNC: 71 U/L (ref 39–117)
ALT SERPL-CCNC: 24 U/L (ref 1–33)
APPEARANCE UR: CLEAR
AST SERPL-CCNC: 25 U/L (ref 1–32)
BACTERIA #/AREA URNS HPF: NORMAL /HPF
BASOPHILS # BLD AUTO: 0.03 10*3/MM3 (ref 0–0.2)
BASOPHILS NFR BLD AUTO: 0.5 % (ref 0–1.5)
BILIRUB SERPL-MCNC: 0.6 MG/DL (ref 0–1.2)
BILIRUB UR QL STRIP: NEGATIVE
BUN SERPL-MCNC: 12 MG/DL (ref 8–23)
BUN/CREAT SERPL: 19.4 (ref 7–25)
CALCIUM SERPL-MCNC: 9.3 MG/DL (ref 8.6–10.5)
CHLORIDE SERPL-SCNC: 103 MMOL/L (ref 98–107)
CHOLEST SERPL-MCNC: 229 MG/DL (ref 0–200)
CO2 SERPL-SCNC: 29 MMOL/L (ref 22–29)
COLOR UR: YELLOW
CREAT SERPL-MCNC: 0.62 MG/DL (ref 0.57–1)
EOSINOPHIL # BLD AUTO: 0.17 10*3/MM3 (ref 0–0.4)
EOSINOPHIL NFR BLD AUTO: 3 % (ref 0.3–6.2)
EPI CELLS #/AREA URNS HPF: NORMAL /HPF (ref 0–10)
ERYTHROCYTE [DISTWIDTH] IN BLOOD BY AUTOMATED COUNT: 12.4 % (ref 12.3–15.4)
GLOBULIN SER CALC-MCNC: 2.5 GM/DL
GLUCOSE SERPL-MCNC: 87 MG/DL (ref 65–99)
GLUCOSE UR QL: NEGATIVE
HCT VFR BLD AUTO: 40.2 % (ref 34–46.6)
HDLC SERPL-MCNC: 63 MG/DL (ref 40–60)
HGB BLD-MCNC: 13.5 G/DL (ref 12–15.9)
HGB UR QL STRIP: NEGATIVE
IMM GRANULOCYTES # BLD AUTO: 0.01 10*3/MM3 (ref 0–0.05)
IMM GRANULOCYTES NFR BLD AUTO: 0.2 % (ref 0–0.5)
KETONES UR QL STRIP: NEGATIVE
LDLC SERPL CALC-MCNC: 147 MG/DL (ref 0–100)
LEUKOCYTE ESTERASE UR QL STRIP: NEGATIVE
LYMPHOCYTES # BLD AUTO: 1.48 10*3/MM3 (ref 0.7–3.1)
LYMPHOCYTES NFR BLD AUTO: 26.3 % (ref 19.6–45.3)
MCH RBC QN AUTO: 29.5 PG (ref 26.6–33)
MCHC RBC AUTO-ENTMCNC: 33.6 G/DL (ref 31.5–35.7)
MCV RBC AUTO: 88 FL (ref 79–97)
MICRO URNS: NORMAL
MICRO URNS: NORMAL
MONOCYTES # BLD AUTO: 0.54 10*3/MM3 (ref 0.1–0.9)
MONOCYTES NFR BLD AUTO: 9.6 % (ref 5–12)
MUCOUS THREADS URNS QL MICRO: PRESENT /HPF
NEUTROPHILS # BLD AUTO: 3.4 10*3/MM3 (ref 1.7–7)
NEUTROPHILS NFR BLD AUTO: 60.4 % (ref 42.7–76)
NITRITE UR QL STRIP: NEGATIVE
NRBC BLD AUTO-RTO: 0 /100 WBC (ref 0–0.2)
PH UR STRIP: 6 [PH] (ref 5–7.5)
PLATELET # BLD AUTO: 182 10*3/MM3 (ref 140–450)
POTASSIUM SERPL-SCNC: 3.9 MMOL/L (ref 3.5–5.2)
PROT SERPL-MCNC: 7 G/DL (ref 6–8.5)
PROT UR QL STRIP: NEGATIVE
RBC # BLD AUTO: 4.57 10*6/MM3 (ref 3.77–5.28)
RBC #/AREA URNS HPF: NORMAL /HPF (ref 0–2)
SODIUM SERPL-SCNC: 139 MMOL/L (ref 136–145)
SP GR UR: 1.02 (ref 1–1.03)
T4 FREE SERPL-MCNC: 1.36 NG/DL (ref 0.93–1.7)
TRIGL SERPL-MCNC: 106 MG/DL (ref 0–150)
TSH SERPL DL<=0.005 MIU/L-ACNC: 3.58 UIU/ML (ref 0.27–4.2)
URINALYSIS REFLEX: NORMAL
UROBILINOGEN UR STRIP-MCNC: 0.2 MG/DL (ref 0.2–1)
VLDLC SERPL CALC-MCNC: 19 MG/DL (ref 5–40)
WBC # BLD AUTO: 5.63 10*3/MM3 (ref 3.4–10.8)
WBC #/AREA URNS HPF: NORMAL /HPF (ref 0–5)

## 2020-11-05 ENCOUNTER — OFFICE VISIT (OUTPATIENT)
Dept: INTERNAL MEDICINE | Facility: CLINIC | Age: 65
End: 2020-11-05

## 2020-11-05 VITALS
HEIGHT: 67 IN | SYSTOLIC BLOOD PRESSURE: 118 MMHG | WEIGHT: 172 LBS | RESPIRATION RATE: 12 BRPM | HEART RATE: 81 BPM | OXYGEN SATURATION: 98 % | BODY MASS INDEX: 27 KG/M2 | DIASTOLIC BLOOD PRESSURE: 70 MMHG | TEMPERATURE: 96.9 F

## 2020-11-05 DIAGNOSIS — Z00.00 HEALTHCARE MAINTENANCE: Primary | ICD-10-CM

## 2020-11-05 DIAGNOSIS — J30.89 ENVIRONMENTAL AND SEASONAL ALLERGIES: ICD-10-CM

## 2020-11-05 DIAGNOSIS — E66.3 OVERWEIGHT (BMI 25.0-29.9): ICD-10-CM

## 2020-11-05 DIAGNOSIS — J45.30 MILD PERSISTENT ASTHMA WITHOUT COMPLICATION: ICD-10-CM

## 2020-11-05 DIAGNOSIS — K91.5 POST-CHOLECYSTECTOMY SYNDROME: ICD-10-CM

## 2020-11-05 PROCEDURE — G0439 PPPS, SUBSEQ VISIT: HCPCS | Performed by: INTERNAL MEDICINE

## 2020-11-05 PROCEDURE — 99397 PER PM REEVAL EST PAT 65+ YR: CPT | Performed by: INTERNAL MEDICINE

## 2020-11-05 RX ORDER — OMEPRAZOLE 20 MG/1
20 CAPSULE, DELAYED RELEASE ORAL DAILY
Qty: 30 CAPSULE | Refills: 1 | Status: SHIPPED | OUTPATIENT
Start: 2020-11-05 | End: 2021-01-04

## 2020-11-05 NOTE — PROGRESS NOTES
Subjective     Chief Complaint   Patient presents with   • Annual Exam     review of medical issues        HPI:  Radha Rivera is a 65 y.o. female RTC In yearly CPE/ AWV, review of medical issues:   1. Asthma and Allergies - long hx, adult onset only.  Is now on shots and up to higher dosing and 'working through that'.  Has had reaction in past so may have to stop at current dose.  Has not had any sx this fall so feels like is working.  Still on Singulair but still using PRN Xyzal in season. Albuterol is 'not very often'. 'My asthma has been under control for a long time'.   2.  Hypothyroidism - recalls that was (-) anti-TPO Ab.  Does not feel any different off med. Has gained some weight but thinks is 'pandemic' eating.   3. Hx of Carcinoma in situ of cervix - in 1977, s/p D & C,  no recurrence. Sees Dr. Pablo 1/2021.  No vaginal bleeding, no issues.    The following portions of the patient's history were reviewed and updated as appropriate: allergies, current medications, past family history, past medical history, past social history, past surgical history and problem list.    Review of Systems   Constitution: Positive for weight gain. Negative for chills, fever and malaise/fatigue.   HENT: Negative for congestion, hearing loss and sore throat.    Eyes: Negative for discharge, double vision, pain and redness.        Last eye exam 2019; has plans for appt upcoming.      Cardiovascular: Negative for chest pain, dyspnea on exertion, irregular heartbeat, near-syncope, palpitations and syncope.   Respiratory: Negative for cough and shortness of breath.    Endocrine: Negative for polydipsia, polyphagia and polyuria.   Hematologic/Lymphatic: Negative for bleeding problem. Does not bruise/bleed easily.   Skin: Negative for rash and suspicious lesions.   Musculoskeletal: Negative for joint pain, joint swelling, muscle cramps, muscle weakness and myalgias.   Gastrointestinal: Positive for abdominal pain (episodic if eats  fast, started after lap maulik in past, told related to surgery.  Some burning in stomach after eating, will take some Tums. small portions helps as well. ). Negative for bloating, constipation, diarrhea, heartburn, nausea and vomiting.   Genitourinary: Negative for dysuria, frequency, genital sores and hematuria.   Neurological: Negative for dizziness, headaches and light-headedness.   Psychiatric/Behavioral: Negative for depression. The patient does not have insomnia and is not nervous/anxious.    Allergic/Immunologic: Positive for environmental allergies (on shots). Negative for persistent infections.       Patient Care Team:  Paolo Dc MD as PCP - General (Internal Medicine)    Recent Hospitalizations: No recent hospitalization(s).    Depression Screen:   PHQ-2/PHQ-9 Depression Screening 11/5/2020   Little interest or pleasure in doing things 0   Feeling down, depressed, or hopeless 0   Trouble falling or staying asleep, or sleeping too much -   Feeling tired or having little energy -   Poor appetite or overeating -   Feeling bad about yourself - or that you are a failure or have let yourself or your family down -   Trouble concentrating on things, such as reading the newspaper or watching television -   Moving or speaking so slowly that other people could have noticed. Or the opposite - being so fidgety or restless that you have been moving around a lot more than usual -   Thoughts that you would be better off dead, or of hurting yourself in some way -   Total Score 0   If you checked off any problems, how difficult have these problems made it for you to do your work, take care of things at home, or get along with other people? -       Functional and Cognitive Screening:  Functional & Cognitive Status 11/5/2020   Do you have difficulty preparing food and eating? No   Do you have difficulty bathing yourself, getting dressed or grooming yourself? No   Do you have difficulty using the toilet? No   Do you have  "difficulty moving around from place to place? No   Do you have trouble with steps or getting out of a bed or a chair? No   Current Diet Well Balanced Diet   Dental Exam Up to date   Eye Exam Not up to date   Exercise (times per week) 3 times per week   Current Exercise Activities Include Walking   Do you need help using the phone?  No   Are you deaf or do you have serious difficulty hearing?  No   Do you need help with transportation? No   Do you need help shopping? No   Do you need help preparing meals?  No   Do you need help with housework?  No   Do you need help with laundry? No   Do you need help taking your medications? No   Do you need help managing money? No   Do you ever drive or ride in a car without wearing a seat belt? No   Have you felt unusual stress, anger or loneliness in the last month? No   Who do you live with? Spouse   If you need help, do you have trouble finding someone available to you? No   Have you been bothered in the last four weeks by sexual problems? No   Do you have difficulty concentrating, remembering or making decisions? -       Does the patient have evidence of cognitive impairment? no    Does not need ASA (and currently is not on it)    Vitals:    11/05/20 0852   BP: 118/70   Pulse: 81   Resp: 12   Temp: 96.9 °F (36.1 °C)   SpO2: 98%   Weight: 78 kg (172 lb)   Height: 170.2 cm (67\")   PainSc: 0-No pain       Body mass index is 26.94 kg/m².    Visual Acuity:  No exam data present    Advanced Care Planning:  ACP discussion was held with the patient during this visit. Patient has an advance directive (not in EMR), copy requested.    Objective   Physical Exam  Vitals signs reviewed.   Constitutional:       General: She is not in acute distress.     Appearance: Normal appearance. She is well-developed. She is not ill-appearing or toxic-appearing.   HENT:      Head: Normocephalic and atraumatic.      Right Ear: Hearing, tympanic membrane, ear canal and external ear normal.      Left Ear: " Hearing, tympanic membrane, ear canal and external ear normal.      Nose: Nose normal.      Mouth/Throat:      Mouth: No injury or oral lesions.      Dentition: Does not have dentures.      Tongue: No lesions.      Pharynx: Oropharynx is clear. Uvula midline. No pharyngeal swelling, oropharyngeal exudate, posterior oropharyngeal erythema or uvula swelling.   Eyes:      General: Lids are normal. No scleral icterus.        Right eye: No discharge.         Left eye: No discharge.      Extraocular Movements: Extraocular movements intact.      Conjunctiva/sclera: Conjunctivae normal.      Pupils: Pupils are equal, round, and reactive to light.   Neck:      Musculoskeletal: Full passive range of motion without pain. Normal range of motion. No neck rigidity or muscular tenderness.      Thyroid: No thyroid mass or thyromegaly.      Vascular: No carotid bruit.      Trachea: Trachea normal.   Cardiovascular:      Rate and Rhythm: Normal rate and regular rhythm.      Pulses:           Radial pulses are 2+ on the right side and 2+ on the left side.        Dorsalis pedis pulses are 2+ on the right side and 2+ on the left side.        Posterior tibial pulses are 2+ on the right side and 2+ on the left side.      Heart sounds: Normal heart sounds, S1 normal and S2 normal. No murmur. No friction rub. No gallop.    Pulmonary:      Effort: Pulmonary effort is normal. No respiratory distress.      Breath sounds: Normal breath sounds. No wheezing, rhonchi or rales.   Abdominal:      General: Bowel sounds are normal. There is no distension.      Palpations: Abdomen is soft. There is no mass.      Tenderness: There is no abdominal tenderness. There is no guarding or rebound.   Musculoskeletal: Normal range of motion.      Right lower leg: No edema.      Left lower leg: No edema.      Right foot: Normal range of motion. No deformity or bunion.      Left foot: Normal range of motion. No deformity or bunion.   Feet:      Right foot:       Skin integrity: No ulcer, blister or skin breakdown.      Left foot:      Skin integrity: No ulcer, blister or skin breakdown.   Lymphadenopathy:      Cervical: No cervical adenopathy.      Upper Body:      Right upper body: No supraclavicular, axillary or pectoral adenopathy.      Left upper body: No supraclavicular, axillary or pectoral adenopathy.      Lower Body: No right inguinal adenopathy. No left inguinal adenopathy.   Skin:     General: Skin is warm and dry.      Findings: No rash.   Neurological:      Mental Status: She is alert and oriented to person, place, and time.      Cranial Nerves: No cranial nerve deficit.      Sensory: No sensory deficit.      Motor: No weakness, tremor, atrophy or abnormal muscle tone.      Gait: Gait normal.      Deep Tendon Reflexes: Reflexes are normal and symmetric.      Reflex Scores:       Patellar reflexes are 2+ on the right side and 2+ on the left side.       Achilles reflexes are 2+ on the right side and 2+ on the left side.  Psychiatric:         Mood and Affect: Mood normal.         Behavior: Behavior normal.         Thought Content: Thought content normal.       Compared to one year ago, the patient feels her physical health is the same.  Compared to one year ago, the patient feels her mental health is the same.    Reviewed chart for potential of high risk medication in the elderly: yes  Reviewed chart for potential of harmful drug interactions in the elderly:yes    Identification of Risk Factors:  Risk factors include: Advance Directive Discussion  Breast Cancer/Mammogram Screening  Obesity/Overweight .    Patient Self-Management and Personalized Health Advice  The patient has been provided with information about: diet, exercise, weight management and the relationship between weight and GERD and preventive services including:   · Annual Wellness Visit (AWV).    Discussed the patient's BMI with @HIM@. The BMI is above average; BMI management plan is  completed.    Assessment/Plan   Diagnoses and all orders for this visit:    1. Healthcare maintenance (Primary)    2. Mild persistent asthma without complication    3. Environmental and seasonal allergies    4. Overweight (BMI 25.0-29.9)    5. Post-cholecystectomy syndrome  Comments:  presented in year after surgery, variable over time, sx c/w gastric reflux.   Orders:  -     omeprazole (PrilOSEC) 20 MG capsule; Take 1 capsule by mouth Daily for 60 days.  Dispense: 30 capsule; Refill: 1            Diagnoses and all orders for this visit:    Healthcare maintenance    Mild persistent asthma without complication    Environmental and seasonal allergies    Overweight (BMI 25.0-29.9)    Post-cholecystectomy syndrome  Comments:  presented in year after surgery, variable over time, sx c/w gastric reflux.   Orders:  -     omeprazole (PrilOSEC) 20 MG capsule; Take 1 capsule by mouth Daily for 60 days.      Radha Rivera is a 65 y.o. female RTC In yearly CPE/ AWV, review of medical issues:   1. Asthma and Allergies - long hx, adult onset only.  On immunotherapy with good response. On Singulair and PRN Xyzal.  Albuterol PRN.   2.  Hx of Hypothyroidism, thought (-) anti-TPO Ab - off levothyroxine for > 1  Year now and feels well, TSH still in normal range. I think initial dosing was started for single TSH swing that did not necessarily represent true hypothyroidism. Will trend.     3. Hx of Carcinoma in situ of cervix - in 1977, s/p D & C,  no recurrence. F/U Dr. Pablo 1/2021 for annual Pap.    4. Overweight - progressive with some pandemic related decline in activity and increase in calorie intake. Pt agrees to TLC for weight loss.   5. Post cholecystectomy syndrome vs. Gastric reflux - pt notes sx of burning, post meal fullness, and sour taste in mouth after surgery and did f/u with Dr. Partida at time.  Sx improved but have recurred, though are mild. Hx is very suspicious for underlying reflux and ? If truly related to  gallbladder surgery in past.  Will start PPI daily for 2 months and see if sx resolve.  Will f/u via video visit in 2 months. Could consider EGD vs. Gastric emptying study but pt prefers to avoid eval if needed.   6. HM - labs d/w pt; Flu/ Prevnar/ Zostavax - UTD; Tdap/ Hep A/ Shingrix/ Pvax - at pharmacy (reassured pt on new shingles vaccine with past Zostavax reaction, counseled on all vaccines); C-scope (-) 11/2016 --> 10 years; Pap 1/2019, appt 1/2021; Mammo UTD 8/2020; DEXA per Gyn; Hep C Ab (-) 9/2016; add more exercise; Preventative care plan provided to pt at end of visit    Return in about 1 year (around 11/5/2021) for Medicare Wellness. (include TSH)  Video visit in 2 months.           Current Outpatient Medications:   •  Calcium-Phosphorus-Vitamin D (CITRACAL +D3) 250-107-500 MG-MG-UNIT chewable tablet, Chew., Disp: , Rfl:   •  Cholecalciferol (VITAMIN D) 1000 UNITS tablet, Take  by mouth., Disp: , Rfl:   •  montelukast (SINGULAIR) 10 MG tablet, TK 1 T PO HS, Disp: , Rfl: 11  •  PROAIR  (90 Base) MCG/ACT inhaler, INL 2 PFS PO Q 4 TO 6 H PRN, Disp: , Rfl: 11  •  omeprazole (PrilOSEC) 20 MG capsule, Take 1 capsule by mouth Daily for 60 days., Disp: 30 capsule, Rfl: 1  •  SYNTHROID 50 MCG tablet, Take 1 tablet by mouth Daily., Disp: 90 tablet, Rfl: 1

## 2020-11-05 NOTE — PATIENT INSTRUCTIONS
Shingrix (new shingles shot; 2 shot series) check at pharmacy  Hepatitis A (2 shot series) check at pharmacy  Tdap (tetanus with whooping cough booster)  Pneumovax at pharmacy or benson come back to office.       Medicare Wellness  Personal Prevention Plan of Service     Date of Office Visit:  2020  Encounter Provider:  Paolo Dc MD  Place of Service:  [facility]  Patient Name: Radha Rivera  :  1955    As part of the Medicare Wellness portion of your visit today, we are providing you with this personalized preventive plan of services (PPPS). This plan is based upon recommendations of the [place] Preventive Services Task Force (USPSTF) and the Advisory Committee on Immunization Practices (ACIP).    This lists the preventive care services that should be considered, and provides dates of when you are due. Items listed as completed are up-to-date and do not require any further intervention.    Health Maintenance   Topic Date Due   • TDAP/TD VACCINES (2 - Td) 2020   • Pneumococcal Vaccine 65+ (2 of 2 - PPSV23) 10/29/2020   • MAMMOGRAM  2021   • ANNUAL WELLNESS VISIT  2021   • COLONOSCOPY  2026   • HEPATITIS C SCREENING  Completed   • INFLUENZA VACCINE  Completed   • ZOSTER VACCINE  Discontinued       No orders of the defined types were placed in this encounter.      Return in about 1 year (around 2021) for Medicare Wellness.

## 2020-11-17 ENCOUNTER — TELEPHONE (OUTPATIENT)
Dept: INTERNAL MEDICINE | Facility: CLINIC | Age: 65
End: 2020-11-17

## 2020-11-17 NOTE — TELEPHONE ENCOUNTER
PT IS REQUESTING A CALL BACK TO LET HER KNOW WHAT HER FIRST PNEUMONIA SHOT WAS.   CALL BACK [phone]

## 2021-01-05 ENCOUNTER — TELEMEDICINE (OUTPATIENT)
Dept: INTERNAL MEDICINE | Facility: CLINIC | Age: 66
End: 2021-01-05

## 2021-01-05 DIAGNOSIS — K21.9 GASTROESOPHAGEAL REFLUX DISEASE, UNSPECIFIED WHETHER ESOPHAGITIS PRESENT: Primary | ICD-10-CM

## 2021-01-05 PROCEDURE — 99212 OFFICE O/P EST SF 10 MIN: CPT | Performed by: INTERNAL MEDICINE

## 2021-01-19 ENCOUNTER — OFFICE VISIT (OUTPATIENT)
Dept: OBSTETRICS AND GYNECOLOGY | Age: 66
End: 2021-01-19

## 2021-01-19 VITALS
WEIGHT: 171 LBS | SYSTOLIC BLOOD PRESSURE: 138 MMHG | HEIGHT: 67 IN | DIASTOLIC BLOOD PRESSURE: 80 MMHG | BODY MASS INDEX: 26.84 KG/M2

## 2021-01-19 DIAGNOSIS — N95.2 ATROPHIC VAGINITIS: ICD-10-CM

## 2021-01-19 DIAGNOSIS — Z01.419 ENCOUNTER FOR GYNECOLOGICAL EXAMINATION: Primary | ICD-10-CM

## 2021-01-19 PROCEDURE — 99397 PER PM REEVAL EST PAT 65+ YR: CPT | Performed by: OBSTETRICS & GYNECOLOGY

## 2021-01-19 NOTE — PROGRESS NOTES
Subjective   Chief Complaint   Patient presents with   • Gynecologic Exam     Annual:Last pap ,mammo ,colonoscopy       History of Present Illness    Radha Rivera is a very pleasant  65 y.o. female who presents for annual exam.  , Mammo Exam scheduled for the summer, , Exercise 3 times a week  Patient is postmenopausal has no bleeding no GYN problems or concerns or complaints.  Overall she is doing well feels a little heavier than she wants to.  She is getting her other screening with her PCP her daughter Bruna is  working nights at [employer] and her grandchildren are doing well..    Obstetric History:  OB History        2    Para   2    Term   2            AB        Living           SAB        TAB        Ectopic        Molar        Multiple        Live Births                   Menstrual History:     No LMP recorded. Patient is postmenopausal.       Sexual History:       Past Medical History:   Diagnosis Date   • Adult hypothyroidism 2016    TPO Ab (-).  TSH stable off med in  and remains off.   • Asthma    • Cancer (CMS/HCC)    • Carcinoma in situ of cervix     IN    • Liver hemangioma     s/p imaging eval in past   • Overweight (BMI 25.0-29.9) 10/29/2019   • Pap smear abnormality of cervix with ASCUS favoring benign        • PMB (postmenopausal bleeding)    • Thyroid disease      Past Surgical History:   Procedure Laterality Date   • CATARACT EXTRACTION, BILATERAL Bilateral 2019   •  SECTION     • CHOLECYSTECTOMY     • COLONOSCOPY N/A 2016    Procedure: COLONOSCOPY TO CECUM;  Surgeon: Aisha Partida MD;  Location: [facility] ENDOSCOPY;  Service:    • DILATATION AND CURETTAGE     • DILATION AND CURETTAGE, DIAGNOSTIC / THERAPEUTIC      endometrial hyperplasia   • TONSILLECTOMY         Current Outpatient Medications:   •  Calcium-Phosphorus-Vitamin D (CITRACAL +D3) 250-107-500 MG-MG-UNIT chewable tablet, Chew., Disp: , Rfl:   •  Cholecalciferol  "(VITAMIN D) 1000 UNITS tablet, Take  by mouth., Disp: , Rfl:   •  montelukast (SINGULAIR) 10 MG tablet, TK 1 T PO HS, Disp: , Rfl: 11  •  PROAIR  (90 Base) MCG/ACT inhaler, INL 2 PFS PO Q 4 TO 6 H PRN, Disp: , Rfl: 11  •  SYNTHROID 50 MCG tablet, Take 1 tablet by mouth Daily., Disp: 90 tablet, Rfl: 1   SOCIAL Hx:      The following portions of the patient's history were reviewed and updated as appropriate: allergies, current medications, past family history, past medical history, past social history, past surgical history and problem list.    Review of Systems        Except as outlined in history of physical illness, patient denies any changes in her GYN, , GI systems.  All other systems reviewed are negative         Objective   Physical Exam    /80   Ht 170.2 cm (67\")   Wt 77.6 kg (171 lb)   Breastfeeding No   BMI 26.78 kg/m²     General: Patient is alert and oriented and appears overall healthy  Neck: Is supple without thyromegaly, no carotid bruits and no lymphadenopathy  Lungs: Clear bilaterally, no wheezing, rhonchi, or rales.  Respiratory rate is normal  Breast: Even symmetrical, no lymphadenopathy, no retraction, no discharge ,no masses appreciated on either side  Heart: Regular rate and rhythm are appreciated, no murmurs or rubs are heard  Abdomen: Is soft, without organomegaly, bowel sounds are positive, there is no                                rebound or guarding and palpation does not produce any discomfort  Back: Nontender without CVA tenderness  Pelvic: External genitalia appear normal and consistent with mature female.  BUS normal              Urethra appears normal and without mass, bladder is nontender and without any lesions                        Urethral meatus is normal without scarring tenderness or masses                 Bladder is without tenderness or fullness                           Vagina is clean dry without discharge and appears adequately estrogenized, no           "     lesions or masses are present                         Cervix is noninflamed without discharge or lesions.  There is no cervical motion             tenderness.                Uterus is nonenlarged, without tenderness, and no masses or abnormalities are  present               Adnexa are non-enlarged, non tender               Rectal digital  exam reveals adequate sphincter tone and no masses or lesions are                     appreciated on digital rectal examination.      Annual Well Woman Exam     Patient Active Problem List   Diagnosis   • Mild persistent asthma without complication   • Environmental and seasonal allergies   • Overweight (BMI 25.0-29.9)   • GERD (gastroesophageal reflux disease)                Assessment/Plan   Diagnoses and all orders for this visit:    1. Encounter for gynecological examination (Primary)  -     IGP, Apt HPV,rfx 16 / 18,45    2. Atrophic vaginitis      Discussed today's findings and concerns with patient.  Continue to recommend regular exercise including cardiovascular and resistance training as well as  breast self-exam. Wellness lab, mammography, & pap smear, in accordance with age guidelines.    I have encouraged her to call for today's test results if she has not received them within 10 days.  Patient is advised to call with any change in her condition or with any other questions, otherwise return  for annual examination.

## 2021-01-21 LAB
CYTOLOGIST CVX/VAG CYTO: NORMAL
CYTOLOGY CVX/VAG DOC CYTO: NORMAL
CYTOLOGY CVX/VAG DOC THIN PREP: NORMAL
DX ICD CODE: NORMAL
HIV 1 & 2 AB SER-IMP: NORMAL
HPV I/H RISK 4 DNA CVX QL PROBE+SIG AMP: NEGATIVE
OTHER STN SPEC: NORMAL
STAT OF ADQ CVX/VAG CYTO-IMP: NORMAL

## 2021-03-19 ENCOUNTER — BULK ORDERING (OUTPATIENT)
Dept: CASE MANAGEMENT | Facility: OTHER | Age: 66
End: 2021-03-19

## 2021-03-19 DIAGNOSIS — Z23 IMMUNIZATION DUE: ICD-10-CM

## 2021-04-21 ENCOUNTER — APPOINTMENT (OUTPATIENT)
Dept: CT IMAGING | Facility: HOSPITAL | Age: 66
End: 2021-04-21

## 2021-04-21 ENCOUNTER — HOSPITAL ENCOUNTER (EMERGENCY)
Facility: HOSPITAL | Age: 66
Discharge: HOME | End: 2021-04-21
Attending: EMERGENCY MEDICINE | Admitting: EMERGENCY MEDICINE

## 2021-04-21 ENCOUNTER — TELEPHONE (OUTPATIENT)
Dept: INTERNAL MEDICINE | Facility: CLINIC | Age: 66
End: 2021-04-21

## 2021-04-21 VITALS
BODY MASS INDEX: 25.9 KG/M2 | DIASTOLIC BLOOD PRESSURE: 98 MMHG | RESPIRATION RATE: 18 BRPM | OXYGEN SATURATION: 94 % | HEIGHT: 67 IN | TEMPERATURE: 98.8 F | SYSTOLIC BLOOD PRESSURE: 147 MMHG | HEART RATE: 81 BPM | WEIGHT: 165 LBS

## 2021-04-21 DIAGNOSIS — S06.0X0A CLOSED HEAD INJURY WITH CONCUSSION, WITHOUT LOSS OF CONSCIOUSNESS, INITIAL ENCOUNTER: Primary | ICD-10-CM

## 2021-04-21 PROCEDURE — 99283 EMERGENCY DEPT VISIT LOW MDM: CPT

## 2021-04-21 PROCEDURE — 70450 CT HEAD/BRAIN W/O DYE: CPT

## 2021-04-21 NOTE — ED PROVIDER NOTES
EMERGENCY DEPARTMENT ENCOUNTER    Room Number:  20/20  Date of encounter:  4/21/2021  PCP: Paolo Dc MD  Historian: Patient      HPI:  Chief Complaint: Head injury  A complete HPI/ROS/PMH/PSH/SH/FH are unobtainable due to: Not applicable  Context: Radha Rivera is a 66 y.o. female who presents to the ED c/o 5 days ago patient tripped over her dogs causing her to fall backwards she landed on her left buttocks and in her head whipped back and bounced and hit the floor twice.  Since that time she has had a persistent headache which will wax and wane in severity.  She is also had some nausea and just feeling a little off.  The symptoms will wax and wane.  She spoke with her primary care doctor who instructed her to come here to the emergency department.  She has no visual change, speech change, any focal weakness to arms or legs.  She is not on any blood thinning medicine.  Has no neck pain.  Her buttocks and her mid back that she had that pain has 100% resolved.  No shortness of breath or any other complaints        Previous Episodes: No    Current Symptoms: See above    MEDICAL HISTORY REVIEWED  Patient is on any blood thinning medicine      PAST MEDICAL HISTORY  Active Ambulatory Problems     Diagnosis Date Noted   • Mild persistent asthma without complication 09/02/2016   • Environmental and seasonal allergies 10/29/2019   • Overweight (BMI 25.0-29.9) 10/29/2019   • GERD (gastroesophageal reflux disease) 01/05/2021     Resolved Ambulatory Problems     Diagnosis Date Noted   • Adult hypothyroidism 09/02/2016   • Abnormal finding on mammography 09/02/2016   • Elevated liver enzymes 09/12/2016     Past Medical History:   Diagnosis Date   • Asthma    • Cancer (CMS/HCC)    • Carcinoma in situ of cervix    • Liver hemangioma    • Pap smear abnormality of cervix with ASCUS favoring benign    • PMB (postmenopausal bleeding)          PAST SURGICAL HISTORY  Past Surgical History:   Procedure Laterality Date   •  CATARACT EXTRACTION, BILATERAL Bilateral 2019   •  SECTION     • CHOLECYSTECTOMY     • COLONOSCOPY N/A 2016    Procedure: COLONOSCOPY TO CECUM;  Surgeon: Aisha Partida MD;  Location: [facility] ENDOSCOPY;  Service:    • DILATATION AND CURETTAGE     • DILATION AND CURETTAGE, DIAGNOSTIC / THERAPEUTIC      endometrial hyperplasia   • TONSILLECTOMY           FAMILY HISTORY  Family History   Problem Relation Age of Onset   • Hypertension Mother    • Thyroid disease Mother    • Osteoporosis Mother    • Alcohol abuse Father    • Cancer Maternal Grandmother         breast cancer   • Breast cancer Maternal Grandmother    • Cancer Cousin         breast cancer - two cousins   • Breast cancer Cousin    • No Known Problems Son    • No Known Problems Daughter          SOCIAL HISTORY  Social History     Socioeconomic History   • Marital status:      Spouse name: Not on file   • Number of children: 2   • Years of education: Not on file   • Highest education level: Not on file   Tobacco Use   • Smoking status: Never Smoker   • Smokeless tobacco: Never Used   Vaping Use   • Vaping Use: Never used   Substance and Sexual Activity   • Alcohol use: Yes     Comment: 3-4 glasses wine/ week   • Drug use: No   • Sexual activity: Yes     Partners: Male     Comment:  only; HPV in past         ALLERGIES  Zostavax [zoster vaccine live]        REVIEW OF SYSTEMS  Review of Systems     All systems reviewed and negative except for those discussed in HPI.       PHYSICAL EXAM    I have reviewed the triage vital signs and nursing notes.    ED Triage Vitals   Temp Heart Rate Resp BP SpO2   21 1647 21 1647 21 1647 21 1651 21 1647   98.8 °F (37.1 °C) 90 18 127/87 95 %      Temp src Heart Rate Source Patient Position BP Location FiO2 (%)   -- -- -- -- --              GENERAL: Pleasant female no acute distress.Vital signs on my initial evaluation vitals are unremarkable  HENT: maribel  patent  Head/neck/ face are symmetric without gross deformity, signs of trauma, or swelling.  Again no obvious trauma seen on this patient.  EYES: no scleral icterus, no conjunctival pallor.  NECK: Supple, no meningismus  CV: regular rhythm, regular rate with intact distal pulses.  RESPIRATORY: normal effort and no respiratory distress.  Nontender to palpation.  Lungs are clear to auscultation bilaterally.  He was  ABDOMEN: soft and nontender.  MUSCULOSKELETAL: no deformity.  NEURO: alert and appropriate, moves all extremities, follows commands.  No focal motor or sensory changes    SKIN: warm, dry    Vital signs and nursing notes reviewed.        LAB RESULTS  No results found for this or any previous visit (from the past 24 hour(s)).    Ordered the above labs and independently reviewed the results.        RADIOLOGY  CT Head Without Contrast    Result Date: 4/21/2021  EMERGENCY NONCONTRAST HEAD CT 04/21/2021  CLINICAL HISTORY: Patient fell head injury  TECHNIQUE: Spiral CT images were obtained from the base of the skull to the vertex without intravenous contrast. Images were re-reformatted and are submitted in 3 mm thick axial CT sections with brain algorithm and 2 mm thick axial CT sections with high-resolution bone algorithm and 2 mm thick sagittal and coronal reconstructions were performed and submitted in brain algorithm.  There are no prior studies from [facility] for comparison.  FINDINGS: The brain parenchyma is normal in attenuation. The ventricles are normal in size. I see no focal mass effect. There is no midline shift. No extra axial fluid collections are identified. There is no evidence of acute intracranial hemorrhage. No acute skull fracture is identified. The paranasal sinuses and the mastoid air cells and the middle ear cavities are clear.      Normal head CT. Specifically no acute skull fracture or intracranial hemorrhage is identified.  Radiation dose reduction techniques were  utilized, including automated exposure control and exposure modulation based on body size.        I ordered the above noted radiological studies. Reviewed by me and discussed with radiologist.  See dictation for official radiology interpretation.      PROCEDURES    Procedures      MEDICATIONS GIVEN IN ER    Medications - No data to display      PROGRESS, DATA ANALYSIS, CONSULTS, AND MEDICAL DECISION MAKING    Symptoms have persisted.  It sounds like her primary care doctor sent her here for a CT scan of the head.  I think that is appropriate.  Very likely has a concussion.  Her exam is benign.  I explained this to the patient patient agrees with this plan.  We are currently under a pandemic from the COVID19 infection.  The patient presented to the emergency department by ambulance or personal vehicle. I followed the current protocols required by Infection Control at [facility] in my evaluation and treatment of the patient. The patient was wearing a face mask during my evaluation and throughout my encounter. During my whole encounter with this patient I used appropriate personal protective equipment.  This equipment consisted of eye protection, facemask, gown, and gloves.  I applied this equipment before entering the room.      All labs have been independently reviewed by me.  All radiology studies have been reviewed by me and discussed with radiologist dictating the report.   EKG's independently viewed and interpreted by me.  Discussion below represents my analysis of pertinent findings related to patient's condition, differential diagnosis, treatment plan and final disposition.      ED Course as of Apr 21 2036   Wed Apr 21, 2021 2035 Discussed with the radiologist Dr. Herr the CT scan of the head.  No acute abnormality appreciated.  Please see complete dictated report    [MM]   2035 Form the patient of the results of the CT scan.  All questions answered.    [MM]      ED Course User Index  [MM]  Rahul Acevedo MD       AS OF 20:36 EDT VITALS:    BP - 165/95  HR - 80  TEMP - 98.8 °F (37.1 °C)  02 SATS - 93%        DIAGNOSIS  Final diagnoses:   Closed head injury with concussion, without loss of consciousness, initial encounter         DISPOSITION  DISCHARGE    Patient discharged in stable condition.    Reviewed implications of results, diagnosis, meds, responsibility to follow up, warning signs and symptoms of possible worsening, potential complications and reasons to return to ER, including worsening of symptoms, worsening of headache, visual change, speech change, any weakness in arms or legs, shortness of breath, chest pain, any concerns..    Patient/Family voiced understanding of above instructions.    Discussed plan for discharge, as there is no emergent indication for admission. Pt/family is agreeable and understands need for follow up and repeat testing.  Pt is aware that discharge does not mean that nothing is wrong but it indicates no emergency is present that requires admission and they must continue care with follow-up as given below or physician of their choice.     FOLLOW-UP  No follow-up provider specified.       Medication List      No changes were made to your prescriptions during this visit.                  Rahul Acevedo MD  04/21/21 2036

## 2021-04-21 NOTE — TELEPHONE ENCOUNTER
Caller: Radha Avila    Relationship: Self    Best call back number: [phone]    What is the best time to reach you: ANYTIME    Who are you requesting to speak with (clinical staff, provider,  specific staff member): CLINICAL STAFF    Do you know the name of the person who called: MARIA ANTONIA AVILA    What was the call regarding: PATIENT SAID SHE FELL LAST WEEK AND HIT HER HEAD ON HER [place] FLOOR AFTER A DOG TRIPPED HER    SHE SAID HER HEAD BOUNCED TWICE AND SHE DID NOT HAVE A BRUISE OR BUMP ON HER HEAD    SHE SAID SHE DOES HAVE A FUNNY TASTE IN HER MOUTH THOUGH AND ALSO HAS KEPT A HEADACHE SINCE THEN AND SOME NAUSEA    SHE THINKS IT MAY BE RELATED TO HER SINUSES, BUT IS NOT SURE, THE PATIENT CALLED AT 3:27 ON 04/21/21 TO SCHEDULE AN APPOINTMENT, NO SAME DAY AVAILABLE    PATIENT IS SCHEDULED TO COME IN Thursday, 04/22/21 WITH YEHUDA MENENDEZ, PLEASE CONTACT PATIENT IF SHE NEEDS TO DO SOMETHING ELSE, PATIENT DID NOT WANT TO GO TO THE EMERGENCY ROOM     Do you require a callback: YES

## 2021-04-21 NOTE — ED TRIAGE NOTES
Pt to ER via [facility] from home. Pt states she had a fall last Friday. Pt states she tripped over her dog. Pt confirms hitting head but denies LOC or blood thinners. Pt states consistent headache and nausea.     Patient in mask. This RN in appropriate PPE - including mask, goggles, and gloves during all of patient care.

## 2021-04-21 NOTE — ED NOTES
Pt reports feeling nauseous with no vomiting intermittently. Pt also reports headache, worsening when laying down.    This RN dressed in appropriate PPE while in pt's room.        Jeane Platt RN  04/21/21 1822       Jeane Platt RN  04/21/21 1833

## 2021-04-21 NOTE — DISCHARGE INSTRUCTIONS
As we discussed, take Tylenol or Motrin for pain.  Follow-up with your primary care doctor for further care.  Do not do any physical activity or any activity that might cause injury to your head.

## 2021-04-22 NOTE — ED NOTES
This RN in appropriate ppe while in pt room. Pt wearing mask.        Lorna Coyne, RN  04/21/21 [id number]

## 2021-07-20 ENCOUNTER — TRANSCRIBE ORDERS (OUTPATIENT)
Dept: ADMINISTRATIVE | Facility: HOSPITAL | Age: 66
End: 2021-07-20

## 2021-07-20 DIAGNOSIS — Z12.31 SCREENING MAMMOGRAM, ENCOUNTER FOR: Primary | ICD-10-CM

## 2021-11-02 DIAGNOSIS — E66.3 OVERWEIGHT (BMI 25.0-29.9): ICD-10-CM

## 2021-11-02 DIAGNOSIS — Z00.00 HEALTHCARE MAINTENANCE: Primary | ICD-10-CM

## 2021-11-02 DIAGNOSIS — J45.30 MILD PERSISTENT ASTHMA WITHOUT COMPLICATION: ICD-10-CM

## 2021-11-02 DIAGNOSIS — E03.9 ADULT HYPOTHYROIDISM: ICD-10-CM

## 2021-11-09 LAB
ALBUMIN SERPL-MCNC: 4.5 G/DL (ref 3.8–4.8)
ALBUMIN/GLOB SERPL: 1.7 {RATIO} (ref 1.2–2.2)
ALP SERPL-CCNC: 86 IU/L (ref 44–121)
ALT SERPL-CCNC: 18 IU/L (ref 0–32)
APPEARANCE UR: CLEAR
AST SERPL-CCNC: 21 IU/L (ref 0–40)
BACTERIA #/AREA URNS HPF: NORMAL /[HPF]
BASOPHILS # BLD AUTO: 0 X10E3/UL (ref 0–0.2)
BASOPHILS NFR BLD AUTO: 1 %
BILIRUB SERPL-MCNC: 0.8 MG/DL (ref 0–1.2)
BILIRUB UR QL STRIP: NEGATIVE
BUN SERPL-MCNC: 17 MG/DL (ref 8–27)
BUN/CREAT SERPL: 24 (ref 12–28)
CALCIUM SERPL-MCNC: 9.5 MG/DL (ref 8.7–10.3)
CASTS URNS QL MICRO: NORMAL /LPF
CHLORIDE SERPL-SCNC: 103 MMOL/L (ref 96–106)
CHOLEST SERPL-MCNC: 224 MG/DL (ref 100–199)
CO2 SERPL-SCNC: 23 MMOL/L (ref 20–29)
COLOR UR: YELLOW
CREAT SERPL-MCNC: 0.72 MG/DL (ref 0.57–1)
EOSINOPHIL # BLD AUTO: 0.1 X10E3/UL (ref 0–0.4)
EOSINOPHIL NFR BLD AUTO: 2 %
EPI CELLS #/AREA URNS HPF: NORMAL /HPF (ref 0–10)
ERYTHROCYTE [DISTWIDTH] IN BLOOD BY AUTOMATED COUNT: 12.6 % (ref 11.7–15.4)
GLOBULIN SER CALC-MCNC: 2.7 G/DL (ref 1.5–4.5)
GLUCOSE SERPL-MCNC: 93 MG/DL (ref 65–99)
GLUCOSE UR QL: NEGATIVE
HCT VFR BLD AUTO: 40.5 % (ref 34–46.6)
HDLC SERPL-MCNC: 61 MG/DL
HGB BLD-MCNC: 13.4 G/DL (ref 11.1–15.9)
HGB UR QL STRIP: NEGATIVE
IMM GRANULOCYTES # BLD AUTO: 0 X10E3/UL (ref 0–0.1)
IMM GRANULOCYTES NFR BLD AUTO: 0 %
KETONES UR QL STRIP: NEGATIVE
LDLC SERPL CALC-MCNC: 146 MG/DL (ref 0–99)
LEUKOCYTE ESTERASE UR QL STRIP: NEGATIVE
LYMPHOCYTES # BLD AUTO: 2 X10E3/UL (ref 0.7–3.1)
LYMPHOCYTES NFR BLD AUTO: 33 %
MCH RBC QN AUTO: 28.8 PG (ref 26.6–33)
MCHC RBC AUTO-ENTMCNC: 33.1 G/DL (ref 31.5–35.7)
MCV RBC AUTO: 87 FL (ref 79–97)
MICRO URNS: NORMAL
MICRO URNS: NORMAL
MONOCYTES # BLD AUTO: 0.5 X10E3/UL (ref 0.1–0.9)
MONOCYTES NFR BLD AUTO: 8 %
NEUTROPHILS # BLD AUTO: 3.5 X10E3/UL (ref 1.4–7)
NEUTROPHILS NFR BLD AUTO: 56 %
NITRITE UR QL STRIP: NEGATIVE
PH UR STRIP: 6.5 [PH] (ref 5–7.5)
PLATELET # BLD AUTO: 216 X10E3/UL (ref 150–450)
POTASSIUM SERPL-SCNC: 4.1 MMOL/L (ref 3.5–5.2)
PROT SERPL-MCNC: 7.2 G/DL (ref 6–8.5)
PROT UR QL STRIP: NEGATIVE
RBC # BLD AUTO: 4.66 X10E6/UL (ref 3.77–5.28)
RBC #/AREA URNS HPF: NORMAL /HPF (ref 0–2)
SODIUM SERPL-SCNC: 141 MMOL/L (ref 134–144)
SP GR UR: 1.01 (ref 1–1.03)
T4 FREE SERPL-MCNC: 0.95 NG/DL (ref 0.82–1.77)
TRIGL SERPL-MCNC: 95 MG/DL (ref 0–149)
TSH SERPL DL<=0.005 MIU/L-ACNC: 3.26 UIU/ML (ref 0.45–4.5)
URINALYSIS REFLEX: NORMAL
UROBILINOGEN UR STRIP-MCNC: 0.2 MG/DL (ref 0.2–1)
VLDLC SERPL CALC-MCNC: 17 MG/DL (ref 5–40)
WBC # BLD AUTO: 6.2 X10E3/UL (ref 3.4–10.8)
WBC #/AREA URNS HPF: NORMAL /HPF (ref 0–5)

## 2021-11-11 ENCOUNTER — OFFICE VISIT (OUTPATIENT)
Dept: INTERNAL MEDICINE | Facility: CLINIC | Age: 66
End: 2021-11-11

## 2021-11-11 VITALS
WEIGHT: 166 LBS | RESPIRATION RATE: 12 BRPM | OXYGEN SATURATION: 96 % | HEIGHT: 67 IN | BODY MASS INDEX: 26.06 KG/M2 | DIASTOLIC BLOOD PRESSURE: 70 MMHG | TEMPERATURE: 97.1 F | HEART RATE: 88 BPM | SYSTOLIC BLOOD PRESSURE: 122 MMHG

## 2021-11-11 DIAGNOSIS — J30.89 ENVIRONMENTAL AND SEASONAL ALLERGIES: ICD-10-CM

## 2021-11-11 DIAGNOSIS — J45.30 MILD PERSISTENT ASTHMA WITHOUT COMPLICATION: ICD-10-CM

## 2021-11-11 DIAGNOSIS — Z23 NEED FOR INFLUENZA VACCINATION: ICD-10-CM

## 2021-11-11 DIAGNOSIS — E66.3 OVERWEIGHT (BMI 25.0-29.9): ICD-10-CM

## 2021-11-11 DIAGNOSIS — Z00.00 HEALTHCARE MAINTENANCE: Primary | ICD-10-CM

## 2021-11-11 DIAGNOSIS — M70.61 TROCHANTERIC BURSITIS OF RIGHT HIP: ICD-10-CM

## 2021-11-11 DIAGNOSIS — K21.9 GASTROESOPHAGEAL REFLUX DISEASE, UNSPECIFIED WHETHER ESOPHAGITIS PRESENT: ICD-10-CM

## 2021-11-11 PROCEDURE — 1170F FXNL STATUS ASSESSED: CPT | Performed by: INTERNAL MEDICINE

## 2021-11-11 PROCEDURE — 99397 PER PM REEVAL EST PAT 65+ YR: CPT | Performed by: INTERNAL MEDICINE

## 2021-11-11 PROCEDURE — 1159F MED LIST DOCD IN RCRD: CPT | Performed by: INTERNAL MEDICINE

## 2021-11-11 PROCEDURE — G0008 ADMIN INFLUENZA VIRUS VAC: HCPCS | Performed by: INTERNAL MEDICINE

## 2021-11-11 PROCEDURE — 90662 IIV NO PRSV INCREASED AG IM: CPT | Performed by: INTERNAL MEDICINE

## 2021-11-11 PROCEDURE — 1126F AMNT PAIN NOTED NONE PRSNT: CPT | Performed by: INTERNAL MEDICINE

## 2021-11-11 PROCEDURE — G0439 PPPS, SUBSEQ VISIT: HCPCS | Performed by: INTERNAL MEDICINE

## 2021-11-11 RX ORDER — OMEPRAZOLE 20 MG/1
20 CAPSULE, DELAYED RELEASE ORAL DAILY
Qty: 90 CAPSULE | Refills: 3 | Status: SHIPPED | OUTPATIENT
Start: 2021-11-11 | End: 2022-08-25

## 2021-11-11 RX ORDER — FAMOTIDINE 20 MG/1
20 TABLET, FILM COATED ORAL DAILY PRN
COMMUNITY
End: 2022-08-25

## 2021-11-11 NOTE — PROGRESS NOTES
"Subjective       Chief Complaint   Patient presents with   • Annual Exam     review of medical issues   • Asthma       HPI:  Radha Rivera is a 66 y.o. female RTC in yearly CPE/ AWV, review of medical issues:  1. Asthma and Allergies - long hx, adult onset only. Shots are helping.  Really struggles in \"March\" and will use prednisone short term in march yearly.   On Singulair < daily.    2.  Hx of Hypothyroidism, thought (-) anti-TPO Ab - off levothyroxine for > 2  Years now.  No constipation issues. Energy is good. No skin, hair, nail changes.      3. Hx of Carcinoma in situ of cervix - in 1977, s/p D & C,  no recurrence. Saw Dr. Pablo 1/2021 for annual Pap and was OK.    4. Post cholecystectomy syndrome vs. Gastric reflux - was on PPI daily after last visit and did resolve. Changed to Pepcid and notes 'it works, but not as well'.  Feels like fatty foods and meat will cause burning at xyphoid region.  Has sx ~2x/ week.  Had resolved fully on PPI daily but then Pepcid did not work as well with 2x/ week return of sx. Is clear that swallowing is OK, no food getting stuck.   5. HM - is not sure will get COVID booster. Had itching issues one month after shot and wonders if that was cause. Is nervous to get booster shot.      The following portions of the patient's history were reviewed and updated as appropriate: allergies, current medications, past family history, past medical history, past social history, past surgical history and problem list.    Review of Systems   Constitutional: Negative for chills, fever, malaise/fatigue, weight gain and weight loss.   HENT: Negative for congestion, hearing loss, odynophagia and sore throat.    Eyes: Negative for discharge, double vision, pain and redness.   Cardiovascular: Negative for chest pain, dyspnea on exertion, irregular heartbeat, leg swelling, near-syncope, palpitations and syncope.   Respiratory: Negative for cough and shortness of breath.    Endocrine: Negative for " polydipsia, polyphagia and polyuria.   Hematologic/Lymphatic: Negative for bleeding problem. Does not bruise/bleed easily.   Skin: Negative for rash and suspicious lesions.        Sees derm yearly, Dr. ISABELLA Rangel     Musculoskeletal: Positive for joint pain (R hip, occasionally, laterally, often after long drive). Negative for joint swelling, muscle cramps, muscle weakness and myalgias.   Gastrointestinal: Positive for heartburn (occasionalyy, 2x/ week). Negative for constipation, diarrhea, dysphagia, nausea and vomiting.   Genitourinary: Negative for bladder incontinence, dysuria, frequency, hematuria, hesitancy and incomplete emptying.   Neurological: Negative for dizziness, headaches and light-headedness.   Psychiatric/Behavioral: Negative for depression. The patient does not have insomnia and is not nervous/anxious.    Allergic/Immunologic: Positive for environmental allergies. Negative for persistent infections.       Patient Care Team:  Paolo Dc MD as PCP - General (Internal Medicine)    Recent Hospitalizations: No recent hospitalization(s).    Depression Screen:   PHQ-2/PHQ-9 Depression Screening 11/11/2021   Little interest or pleasure in doing things 0   Feeling down, depressed, or hopeless 0   Trouble falling or staying asleep, or sleeping too much -   Feeling tired or having little energy -   Poor appetite or overeating -   Feeling bad about yourself - or that you are a failure or have let yourself or your family down -   Trouble concentrating on things, such as reading the newspaper or watching television -   Moving or speaking so slowly that other people could have noticed. Or the opposite - being so fidgety or restless that you have been moving around a lot more than usual -   Thoughts that you would be better off dead, or of hurting yourself in some way -   Total Score 0   If you checked off any problems, how difficult have these problems made it for you to do your work, take care of things at  "home, or get along with other people? -       Functional and Cognitive Screening:  Functional & Cognitive Status 11/11/2021   Do you have difficulty preparing food and eating? No   Do you have difficulty bathing yourself, getting dressed or grooming yourself? No   Do you have difficulty using the toilet? No   Do you have difficulty moving around from place to place? No   Do you have trouble with steps or getting out of a bed or a chair? No   Current Diet Well Balanced Diet   Dental Exam Up to date   Eye Exam Up to date   Exercise (times per week) 2 times per week   Current Exercises Include Walking   Current Exercise Activities Include -   Do you need help using the phone?  No   Are you deaf or do you have serious difficulty hearing?  No   Do you need help with transportation? No   Do you need help shopping? No   Do you need help preparing meals?  No   Do you need help with housework?  No   Do you need help with laundry? No   Do you need help taking your medications? No   Do you need help managing money? No   Do you ever drive or ride in a car without wearing a seat belt? No   Have you felt unusual stress, anger or loneliness in the last month? No   Who do you live with? Spouse   If you need help, do you have trouble finding someone available to you? No   Have you been bothered in the last four weeks by sexual problems? No   Do you have difficulty concentrating, remembering or making decisions? -       Does the patient have evidence of cognitive impairment? no    Does not need ASA (and currently is not on it)    Vitals:    11/11/21 0905   BP: 122/70   Pulse: 88   Resp: 12   Temp: 97.1 °F (36.2 °C)   SpO2: 96%   Weight: 75.3 kg (166 lb)   Height: 170.2 cm (67\")   PainSc: 0-No pain       Body mass index is 26 kg/m².    Visual Acuity:  No exam data present    Advanced Care Planning:  ACP discussion was held with the patient during this visit. Patient does not have an advance directive, information " provided.    Objective   Physical Exam  Vitals reviewed.   Constitutional:       General: She is not in acute distress.     Appearance: Normal appearance. She is well-developed. She is not ill-appearing or toxic-appearing.   HENT:      Head: Normocephalic and atraumatic.      Right Ear: Hearing, tympanic membrane, ear canal and external ear normal. There is no impacted cerumen.      Left Ear: Hearing, tympanic membrane, ear canal and external ear normal. There is no impacted cerumen.      Nose: Nose normal.      Mouth/Throat:      Mouth: Mucous membranes are moist. No injury or oral lesions.      Dentition: Does not have dentures.      Tongue: No lesions.      Pharynx: Oropharynx is clear. Uvula midline. No pharyngeal swelling, oropharyngeal exudate, posterior oropharyngeal erythema or uvula swelling.   Eyes:      General: Lids are normal. No scleral icterus.        Right eye: No discharge.         Left eye: No discharge.      Extraocular Movements: Extraocular movements intact.      Conjunctiva/sclera: Conjunctivae normal.      Pupils: Pupils are equal, round, and reactive to light.   Neck:      Thyroid: No thyroid mass or thyromegaly.      Vascular: No carotid bruit.      Trachea: Trachea normal.   Cardiovascular:      Rate and Rhythm: Normal rate and regular rhythm.      Pulses:           Radial pulses are 2+ on the right side and 2+ on the left side.        Dorsalis pedis pulses are 2+ on the right side and 2+ on the left side.        Posterior tibial pulses are 2+ on the right side and 2+ on the left side.      Heart sounds: Normal heart sounds, S1 normal and S2 normal. No murmur heard.  No friction rub. No gallop.    Pulmonary:      Effort: Pulmonary effort is normal. No respiratory distress.      Breath sounds: Normal breath sounds. No wheezing, rhonchi or rales.   Chest:   Breasts:      Right: No axillary adenopathy or supraclavicular adenopathy.      Left: No axillary adenopathy or supraclavicular  adenopathy.       Abdominal:      General: Bowel sounds are normal. There is no distension.      Palpations: Abdomen is soft. There is no mass.      Tenderness: There is no abdominal tenderness. There is no guarding or rebound.   Musculoskeletal:         General: Normal range of motion.      Cervical back: Full passive range of motion without pain. No rigidity. No muscular tenderness. Normal range of motion.      Right hip: Bony tenderness (over trochanteric bursa) present. No tenderness or crepitus. Normal range of motion.      Right lower leg: No edema.      Left lower leg: No edema.      Right foot: Normal range of motion. No deformity or bunion.      Left foot: Normal range of motion. No deformity or bunion.   Feet:      Right foot:      Skin integrity: No ulcer, blister or skin breakdown.      Left foot:      Skin integrity: No ulcer, blister or skin breakdown.   Lymphadenopathy:      Cervical: No cervical adenopathy.      Upper Body:      Right upper body: No supraclavicular, axillary or pectoral adenopathy.      Left upper body: No supraclavicular, axillary or pectoral adenopathy.      Lower Body: No right inguinal adenopathy. No left inguinal adenopathy.   Skin:     General: Skin is warm and dry.      Findings: No rash.   Neurological:      Mental Status: She is alert and oriented to person, place, and time.      Cranial Nerves: No cranial nerve deficit.      Sensory: No sensory deficit.      Motor: No weakness, tremor, atrophy or abnormal muscle tone.      Gait: Gait (slight limp on R with straight leg gait on R leg ) normal.      Deep Tendon Reflexes: Reflexes are normal and symmetric.      Reflex Scores:       Patellar reflexes are 2+ on the right side and 2+ on the left side.       Achilles reflexes are 2+ on the right side and 2+ on the left side.  Psychiatric:         Mood and Affect: Mood normal.         Behavior: Behavior normal.         Thought Content: Thought content normal.         Compared to  one year ago, the patient feels physical health is the same.  Compared to one year ago, the patient feels mental health is the same.    Reviewed chart for potential of high risk medication in the elderly: yes  Reviewed chart for potential of harmful drug interactions in the elderly:yes    Identification of Risk Factors:  Risk factors include: Advance Directive Discussion  Breast Cancer/Mammogram Screening  Colon Cancer Screening  Immunizations Discussed/Encouraged (specific immunizations; Hepatitis A Vaccine/Series and COVID19 ).    Patient Self-Management and Personalized Health Advice  The patient has been provided with information about: diet, exercise and weight management and preventive services including:   · Annual Wellness Visit (AWV)  · Bone Density Measurements  · Colorectal Cancer Screening, Colonoscopy  · Screening Mammography .    Discussed the patient's BMI with her. The BMI is above average; BMI management plan is completed.    Assessment/Plan   Diagnoses and all orders for this visit:    1. Healthcare maintenance (Primary)    2. Gastroesophageal reflux disease, unspecified whether esophagitis present  -     omeprazole (PrilOSEC) 20 MG capsule; Take 1 capsule by mouth Daily.  Dispense: 90 capsule; Refill: 3    3. Mild persistent asthma without complication    4. Environmental and seasonal allergies    5. Overweight (BMI 25.0-29.9)    6. Need for influenza vaccination  -     Fluzone High-Dose 65+yrs (3230-8099)    7. Trochanteric bursitis of right hip            Diagnoses and all orders for this visit:    Healthcare maintenance    Gastroesophageal reflux disease, unspecified whether esophagitis present  -     omeprazole (PrilOSEC) 20 MG capsule; Take 1 capsule by mouth Daily.    Mild persistent asthma without complication    Environmental and seasonal allergies    Overweight (BMI 25.0-29.9)    Need for influenza vaccination  -     Fluzone High-Dose 65+yrs (1098-8980)    Trochanteric bursitis of right  hip    Other orders  -     famotidine (PEPCID) 20 MG tablet; Take 20 mg by mouth Daily As Needed.      Radha Rivera is a 66 y.o. female RTC in yearly CPE/ AWV, review of medical issues:  1. Asthma and Allergies - long hx, adult onset only. On immunotherapy daily, Singulair < daily and PRN Xyzal. Steroid dose annually in March, per allergy. Albuterol PRN.   2.  Hx of Hypothyroidism, though (-) anti-TPO Ab - off levothyroxine for > 2  Years now and TSH remains normal.  Trend TSH off meds.   3. Hx of Carcinoma in situ of cervix - in 1977, s/p D & C,  no recurrence. UTD Dr. Pablo 1/2021 for annual Pap.    4. Overweight - remains with decline in activity, no weight loss. D/W pt need for resumption of exercise for weight loss.   5. Post cholecystectomy syndrome vs. Gastric reflux - sx resolved on PPI daily after last visit, changed to Pepcid daily with return of sx > 1 x/ weekly.  Advised to change back to low dose PPI after risk vs. Benefit discussion. Pepcid PRN for any breakthrough. No red flag sx today.   6. R hip trochanteric bursitis/ gluteal tendonitis - noted in last year with prolonged sitting. Pt minimizes issue today, but clear alteration in gait observed as pt stood up and walked out of visit.  D/W pt PT vs. Sports med vs. Ice/ Voltaren topically. Pt declines all for now, but will stand ready to address if sx progress. Restart exercise.   7. HM - labs d/w pt; Flu/ Prevnar/ Zostavax/ Tdap/ Shingrix/ Pvax/ COVID - UTD; Hep A and COVID booster (long d/w pt on risk vs. Benefit after itching issue one month after primary series) at pharmacy; C-scope (-) 11/2016 --> 10 years; Pap UTD 1/2021 --> annually with hx carcinoma in situ; Mammo 8/2020, pt to scheduled; DEXA per Gyn; Hep C Ab (-) 9/2016; add more exercise; Preventative care plan provided to pt at end of visit    Return in about 1 year (around 11/11/2022) for Medicare Wellness.          Current Outpatient Medications:   •  Calcium-Phosphorus-Vitamin D  (CITRACAL +D3) 250-107-500 MG-MG-UNIT chewable tablet, Chew., Disp: , Rfl:   •  Cholecalciferol (VITAMIN D) 1000 UNITS tablet, Take  by mouth., Disp: , Rfl:   •  famotidine (PEPCID) 20 MG tablet, Take 20 mg by mouth Daily As Needed., Disp: , Rfl:   •  montelukast (SINGULAIR) 10 MG tablet, TK 1 T PO HS, Disp: , Rfl: 11  •  omeprazole (PrilOSEC) 20 MG capsule, Take 1 capsule by mouth Daily., Disp: 90 capsule, Rfl: 3  •  PROAIR  (90 Base) MCG/ACT inhaler, INL 2 PFS PO Q 4 TO 6 H PRN, Disp: , Rfl: 11

## 2022-01-19 ENCOUNTER — HOSPITAL ENCOUNTER (OUTPATIENT)
Dept: MAMMOGRAPHY | Facility: HOSPITAL | Age: 67
Discharge: HOME | End: 2022-01-19
Admitting: INTERNAL MEDICINE

## 2022-01-19 DIAGNOSIS — Z12.31 SCREENING MAMMOGRAM, ENCOUNTER FOR: ICD-10-CM

## 2022-01-19 PROCEDURE — 77063 BREAST TOMOSYNTHESIS BI: CPT

## 2022-01-19 PROCEDURE — 77067 SCR MAMMO BI INCL CAD: CPT

## 2022-08-17 ENCOUNTER — OFFICE VISIT (OUTPATIENT)
Dept: OBSTETRICS AND GYNECOLOGY | Age: 67
End: 2022-08-17

## 2022-08-17 VITALS
WEIGHT: 171 LBS | DIASTOLIC BLOOD PRESSURE: 70 MMHG | BODY MASS INDEX: 26.84 KG/M2 | HEIGHT: 67 IN | SYSTOLIC BLOOD PRESSURE: 130 MMHG

## 2022-08-17 DIAGNOSIS — N95.2 ATROPHIC VAGINITIS: Primary | ICD-10-CM

## 2022-08-17 PROCEDURE — 99212 OFFICE O/P EST SF 10 MIN: CPT | Performed by: OBSTETRICS & GYNECOLOGY

## 2022-08-17 NOTE — PROGRESS NOTES
Subjective       History of Present Illness  Radha Rivera is a 67 y.o. female is being seen today for several concerns.  She has history of atrophic vaginitis.  She has a history of CIS in .  Chief Complaint   Patient presents with   • Gynecologic Exam     Annual:last pap ,mammo ,colonoscopy    .        The following portions of the patient's history were reviewed and updated as appropriate: allergies, current medications, past family history, past medical history, past social history, past surgical history and problem list.    PAST MEDICAL HISTORY  Past Medical History:   Diagnosis Date   • Asthma    • Cancer (HCC)    • Carcinoma in situ of cervix     IN    • Liver hemangioma     s/p imaging eval in past   • Overweight (BMI 25.0-29.9) 10/29/2019   • Pap smear abnormality of cervix with ASCUS favoring benign        • PMB (postmenopausal bleeding)      OB History    Para Term  AB Living   2 2 2         SAB IAB Ectopic Molar Multiple Live Births                    # Outcome Date GA Lbr [place]/2nd Weight Sex Delivery Anes PTL Lv   2 Term            1 Term              Past Surgical History:   Procedure Laterality Date   • CATARACT EXTRACTION, BILATERAL Bilateral 2019   •  SECTION     • CHOLECYSTECTOMY     • COLONOSCOPY N/A 2016    Procedure: COLONOSCOPY TO CECUM;  Surgeon: Aisha Partida MD;  Location: [facility] ENDOSCOPY;  Service:    • DILATATION AND CURETTAGE     • DILATION AND CURETTAGE, DIAGNOSTIC / THERAPEUTIC      endometrial hyperplasia   • TONSILLECTOMY       Family History   Problem Relation Age of Onset   • Hypertension Mother    • Thyroid disease Mother    • Osteoporosis Mother    • Alcohol abuse Father    • Cancer Maternal Grandmother         breast cancer   • Breast cancer Maternal Grandmother    • Cancer Cousin         breast cancer - two cousins   • Breast cancer Cousin    • No Known Problems Son    • No Known Problems Daughter      Social History      Tobacco Use   Smoking Status Never Smoker   Smokeless Tobacco Never Used       Current Outpatient Medications:   •  Calcium-Phosphorus-Vitamin D 250-107-500 MG-MG-UNIT chewable tablet, Chew., Disp: , Rfl:   •  Cholecalciferol (VITAMIN D) 1000 UNITS tablet, Take  by mouth., Disp: , Rfl:   •  famotidine (PEPCID) 20 MG tablet, Take 20 mg by mouth Daily As Needed., Disp: , Rfl:   •  montelukast (SINGULAIR) 10 MG tablet, TK 1 T PO HS, Disp: , Rfl: 11  •  omeprazole (PrilOSEC) 20 MG capsule, Take 1 capsule by mouth Daily., Disp: 90 capsule, Rfl: 3  •  PROAIR  (90 Base) MCG/ACT inhaler, INL 2 PFS PO Q 4 TO 6 H PRN, Disp: , Rfl: 11  Immunization History   Administered Date(s) Administered   • COVID-19 (PFIZER) PURPLE CAP 03/13/2021, 04/03/2021   • Fluzone High Dose =>65 Years ([employer] ONLY) 10/29/2019, 10/20/2020   • Fluzone High-Dose 65+yrs 11/11/2021   • Fluzone Split Quad (Multi-dose) 10/10/2016, 09/18/2017   • Pneumococcal Conjugate 13-Valent (PCV13) 10/29/2019   • Pneumococcal Polysaccharide (PPSV23) 12/08/2020   • Shingrix 11/17/2020, 02/12/2021   • Tdap 06/01/2010, 11/17/2020   • Zostavax 09/12/2016       Review of Systems       Except as outlined in history of physical illness, patient denies any changes in her GYN, , GI systems. All other systems reviewed are negative.    Objective   Physical Exam   Alert and oriented, respirations unlabored, heart regular rate and rhythm   Pelvic external genitalia normal mild vaginal atrophy unchanged asymptomatic cervix uterus adnexa normal rectal exams normal  Breast are even and symmetrical lymph no lymphadenopathy no retraction no masses  Heart regular rate and rhythm  Lungs are clear      Assessment & Plan   Diagnoses and all orders for this visit:    1. Atrophic vaginitis (Primary)                 No orders of the defined types were placed in this encounter.          EMR Dragon/ Transcription disclaimer:  Much of the encounter note is an electronic  transcription/translation of spoken language to printed text. The electronic translation of spoken language may permit erroneous, or at times, nonessential words or phrases to be inadvertently transcribes; Although i have reviewed the note for such errors, some may still exist.

## 2022-08-25 ENCOUNTER — OFFICE VISIT (OUTPATIENT)
Dept: INTERNAL MEDICINE | Facility: CLINIC | Age: 67
End: 2022-08-25

## 2022-08-25 VITALS
HEART RATE: 77 BPM | BODY MASS INDEX: 27.15 KG/M2 | DIASTOLIC BLOOD PRESSURE: 68 MMHG | TEMPERATURE: 98.5 F | WEIGHT: 173 LBS | HEIGHT: 67 IN | SYSTOLIC BLOOD PRESSURE: 128 MMHG | OXYGEN SATURATION: 98 %

## 2022-08-25 DIAGNOSIS — Z79.899 LONG-TERM CURRENT USE OF PROTON PUMP INHIBITOR THERAPY: ICD-10-CM

## 2022-08-25 DIAGNOSIS — K21.9 GASTROESOPHAGEAL REFLUX DISEASE, UNSPECIFIED WHETHER ESOPHAGITIS PRESENT: Primary | ICD-10-CM

## 2022-08-25 DIAGNOSIS — R13.19 ESOPHAGEAL DYSPHAGIA: ICD-10-CM

## 2022-08-25 DIAGNOSIS — R10.9 ABDOMINAL DISCOMFORT: ICD-10-CM

## 2022-08-25 DIAGNOSIS — R14.2 BELCHING: ICD-10-CM

## 2022-08-25 PROCEDURE — 99214 OFFICE O/P EST MOD 30 MIN: CPT | Performed by: INTERNAL MEDICINE

## 2022-08-25 RX ORDER — OMEPRAZOLE 40 MG/1
40 CAPSULE, DELAYED RELEASE ORAL DAILY
Qty: 90 CAPSULE | Refills: 0 | Status: SHIPPED | OUTPATIENT
Start: 2022-08-25 | End: 2022-12-15 | Stop reason: SDUPTHER

## 2022-08-25 NOTE — PROGRESS NOTES
"GI Problem (Stomach issues; doesn't feel well )      HPI  Radha Rivera is a 67 y.o. female RTC in acute care :  \"I just dont feel good'.  Notes that has some 'burning or discomfort or taste (sour, 'kind of ya')' intermittent since 2011 when had gallbladder out.  Has had 2 events in last several months where food has come back up in mouth.  Feels like worse over time, but worse in last month.  'I belch all the time'. Really has to hold back burps when around people as 'it is embarrassing.  Will feel 'icky' at times in upper abdomen at times and will have some pain where has 'to lean over'. Pain is not sharp, is 'subtle, just there'.  Not having N/V.   Has had a few events where felt like food did not get all the way down. 'I feel like I have to wait a minute before I have to take another bite'.  Will happen several times/ week.  Never vomited but had food come up 2x in last several month.  Steak is hardest food to 'process' as will hjave issues getting all the way down and 'does not feel right'.  Has not been taking omeprazole very often. Really was only taking 1-2x/ week.  Pepcid did not help in past and stopped that.   Bowels are OK.  Has BM q 2 days, typical habit for pt. No straining with BM's.     Review of Systems   Constitutional: Negative for chills, fever and weight loss.   Cardiovascular: Negative for chest pain.   Respiratory: Negative for shortness of breath.    Hematologic/Lymphatic: Negative for bleeding problem.   Gastrointestinal: Positive for bloating, abdominal pain, dysphagia, flatus (belching only) and heartburn. Negative for change in bowel habit, constipation, diarrhea, nausea and vomiting.       The following portions of the patient's history were reviewed and updated as appropriate: allergies, current medications, past medical history, past social history and problem list.      Current Outpatient Medications:   •  Calcium-Phosphorus-Vitamin D 250-107-500 MG-MG-UNIT chewable tablet, Chew., " "Disp: , Rfl:   •  Cholecalciferol (VITAMIN D) 1000 UNITS tablet, Take  by mouth., Disp: , Rfl:   •  PROAIR  (90 Base) MCG/ACT inhaler, INL 2 PFS PO Q 4 TO 6 H PRN, Disp: , Rfl: 11  •  montelukast (SINGULAIR) 10 MG tablet, TK 1 T PO HS, Disp: , Rfl: 11  •  omeprazole (priLOSEC) 40 MG capsule, Take 1 capsule by mouth Daily., Disp: 90 capsule, Rfl: 0    Vitals:    08/25/22 0915   BP: 128/68   BP Location: Right arm   Patient Position: Sitting   Cuff Size: Adult   Pulse: 77   Temp: 98.5 °F (36.9 °C)   TempSrc: Oral   SpO2: 98%   Weight: 78.5 kg (173 lb)   Height: 170.2 cm (67.01\")     Body mass index is 27.09 kg/m².      Physical Exam  Constitutional:       General: She is not in acute distress.     Appearance: Normal appearance. She is normal weight. She is not ill-appearing or toxic-appearing.   HENT:      Head: Normocephalic and atraumatic.      Mouth/Throat:      Mouth: Mucous membranes are moist.      Pharynx: Oropharynx is clear. No oropharyngeal exudate.   Eyes:      General: No scleral icterus.     Conjunctiva/sclera: Conjunctivae normal.      Pupils: Pupils are equal, round, and reactive to light.   Neck:      Vascular: No carotid bruit.   Cardiovascular:      Rate and Rhythm: Normal rate and regular rhythm.      Heart sounds: No murmur heard.    No friction rub. No gallop.   Pulmonary:      Effort: Pulmonary effort is normal. No respiratory distress.      Breath sounds: No wheezing, rhonchi or rales.   Abdominal:      General: Abdomen is flat. Bowel sounds are normal. There is no distension.      Palpations: There is no mass.      Tenderness: There is generalized abdominal tenderness (mild, 'a little'). There is no guarding or rebound.   Musculoskeletal:      Cervical back: Normal range of motion and neck supple. No tenderness.   Lymphadenopathy:      Cervical: No cervical adenopathy.   Neurological:      General: No focal deficit present.      Mental Status: She is alert.      Cranial Nerves: No " cranial nerve deficit.      Gait: Gait normal.   Psychiatric:         Mood and Affect: Mood normal.         Behavior: Behavior normal.         Thought Content: Thought content normal.         Assessment/ Plan  Diagnoses and all orders for this visit:    Gastroesophageal reflux disease, unspecified whether esophagitis present  -     omeprazole (priLOSEC) 40 MG capsule; Take 1 capsule by mouth Daily.  -     CBC & Differential  -     Comprehensive Metabolic Panel  -     Lipase  -     Vitamin B12  -     Folate  -     H. Pylori Antigen, Stool - Stool, Per Rectum  -     Ambulatory Referral to Gastroenterology    Abdominal discomfort  -     CBC & Differential  -     Comprehensive Metabolic Panel  -     Lipase  -     Vitamin B12  -     Folate  -     H. Pylori Antigen, Stool - Stool, Per Rectum  -     Ambulatory Referral to Gastroenterology    Belching  -     omeprazole (priLOSEC) 40 MG capsule; Take 1 capsule by mouth Daily.  -     CBC & Differential  -     Comprehensive Metabolic Panel  -     Lipase  -     Vitamin B12  -     Folate  -     H. Pylori Antigen, Stool - Stool, Per Rectum  -     Ambulatory Referral to Gastroenterology    Esophageal dysphagia  -     omeprazole (priLOSEC) 40 MG capsule; Take 1 capsule by mouth Daily.  -     CBC & Differential  -     Comprehensive Metabolic Panel  -     Lipase  -     Vitamin B12  -     Folate  -     H. Pylori Antigen, Stool - Stool, Per Rectum  -     Ambulatory Referral to Gastroenterology    Long-term current use of proton pump inhibitor therapy  -     CBC & Differential  -     Comprehensive Metabolic Panel  -     Lipase  -     Vitamin B12  -     Folate  -     H. Pylori Antigen, Stool - Stool, Per Rectum  -     Ambulatory Referral to Gastroenterology        Return for Next scheduled follow up.      Discussion:  Radha Rivera is a 67 y.o. female RTC in acute care (progressive issues to examiner) with months of not feeling generally well and some acceleration of abdominal discomfort  and heartburn noted every since lap maulik in 2011.  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  Sx have correlated with being off PPI largely with only 1-2x/ week use of OTC dosing.   I d/w pt issue today and my concern for esophageal inflammation with uncontrolled on reflux.    -Check labs as noted above, include H. Pylori.    -Start PPI Rx dose daily, urged for compliance. I did d/w pt long term safety data and issue with long term PPI use, focusing on risk vs. Benefit.  - Refer to GI for EGD consideration given red flag sx of dysphagia.     RTC as planned.

## 2022-08-26 LAB
ALBUMIN SERPL-MCNC: 4.4 G/DL (ref 3.8–4.8)
ALBUMIN/GLOB SERPL: 1.6 {RATIO} (ref 1.2–2.2)
ALP SERPL-CCNC: 82 IU/L (ref 44–121)
ALT SERPL-CCNC: 13 IU/L (ref 0–32)
AST SERPL-CCNC: 20 IU/L (ref 0–40)
BASOPHILS # BLD AUTO: 0 X10E3/UL (ref 0–0.2)
BASOPHILS NFR BLD AUTO: 0 %
BILIRUB SERPL-MCNC: 0.5 MG/DL (ref 0–1.2)
BUN SERPL-MCNC: 13 MG/DL (ref 8–27)
BUN/CREAT SERPL: 18 (ref 12–28)
CALCIUM SERPL-MCNC: 9.5 MG/DL (ref 8.7–10.3)
CHLORIDE SERPL-SCNC: 101 MMOL/L (ref 96–106)
CO2 SERPL-SCNC: 24 MMOL/L (ref 20–29)
CREAT SERPL-MCNC: 0.71 MG/DL (ref 0.57–1)
EGFRCR-CYS SERPLBLD CKD-EPI 2021: 93 ML/MIN/1.73
EOSINOPHIL # BLD AUTO: 0.1 X10E3/UL (ref 0–0.4)
EOSINOPHIL NFR BLD AUTO: 3 %
ERYTHROCYTE [DISTWIDTH] IN BLOOD BY AUTOMATED COUNT: 12.9 % (ref 11.7–15.4)
FOLATE SERPL-MCNC: 10.4 NG/ML
GLOBULIN SER CALC-MCNC: 2.7 G/DL (ref 1.5–4.5)
GLUCOSE SERPL-MCNC: 103 MG/DL (ref 65–99)
H PYLORI AG STL QL IA: NORMAL
HCT VFR BLD AUTO: 42.5 % (ref 34–46.6)
HGB BLD-MCNC: 13.9 G/DL (ref 11.1–15.9)
IMM GRANULOCYTES # BLD AUTO: 0 X10E3/UL (ref 0–0.1)
IMM GRANULOCYTES NFR BLD AUTO: 0 %
LIPASE SERPL-CCNC: 39 U/L (ref 14–72)
LYMPHOCYTES # BLD AUTO: 1.4 X10E3/UL (ref 0.7–3.1)
LYMPHOCYTES NFR BLD AUTO: 29 %
MCH RBC QN AUTO: 28.8 PG (ref 26.6–33)
MCHC RBC AUTO-ENTMCNC: 32.7 G/DL (ref 31.5–35.7)
MCV RBC AUTO: 88 FL (ref 79–97)
MONOCYTES # BLD AUTO: 0.4 X10E3/UL (ref 0.1–0.9)
MONOCYTES NFR BLD AUTO: 8 %
NEUTROPHILS # BLD AUTO: 3 X10E3/UL (ref 1.4–7)
NEUTROPHILS NFR BLD AUTO: 60 %
PLATELET # BLD AUTO: 207 X10E3/UL (ref 150–450)
POTASSIUM SERPL-SCNC: 4.4 MMOL/L (ref 3.5–5.2)
PROT SERPL-MCNC: 7.1 G/DL (ref 6–8.5)
RBC # BLD AUTO: 4.82 X10E6/UL (ref 3.77–5.28)
SODIUM SERPL-SCNC: 140 MMOL/L (ref 134–144)
SPECIMEN STATUS: NORMAL
UNABLE TO VOID: NORMAL
VIT B12 SERPL-MCNC: 288 PG/ML (ref 232–1245)
WBC # BLD AUTO: 5 X10E3/UL (ref 3.4–10.8)

## 2022-08-28 LAB — H PYLORI AG STL QL IA: NEGATIVE

## 2022-09-15 ENCOUNTER — OFFICE (OUTPATIENT)
Dept: URBAN - METROPOLITAN AREA PATHOLOGY 4 | Facility: PATHOLOGY | Age: 67
End: 2022-09-15
Payer: COMMERCIAL

## 2022-09-15 ENCOUNTER — AMBULATORY SURGICAL CENTER (OUTPATIENT)
Dept: URBAN - METROPOLITAN AREA SURGERY 20 | Facility: SURGERY | Age: 67
End: 2022-09-15

## 2022-09-15 DIAGNOSIS — R13.10 DYSPHAGIA, UNSPECIFIED: ICD-10-CM

## 2022-09-15 DIAGNOSIS — K21.9 GASTRO-ESOPHAGEAL REFLUX DISEASE WITHOUT ESOPHAGITIS: ICD-10-CM

## 2022-09-15 DIAGNOSIS — K29.00 ACUTE GASTRITIS WITHOUT BLEEDING: ICD-10-CM

## 2022-09-15 DIAGNOSIS — K22.2 ESOPHAGEAL OBSTRUCTION: ICD-10-CM

## 2022-09-15 DIAGNOSIS — K31.89 OTHER DISEASES OF STOMACH AND DUODENUM: ICD-10-CM

## 2022-09-15 LAB
GI HISTOLOGY: A. SELECT: (no result)
GI HISTOLOGY: PDF REPORT: (no result)

## 2022-09-15 PROCEDURE — 88342 IMHCHEM/IMCYTCHM 1ST ANTB: CPT | Performed by: INTERNAL MEDICINE

## 2022-09-15 PROCEDURE — 43239 EGD BIOPSY SINGLE/MULTIPLE: CPT | Performed by: INTERNAL MEDICINE

## 2022-09-15 PROCEDURE — 88305 TISSUE EXAM BY PATHOLOGIST: CPT | Performed by: INTERNAL MEDICINE

## 2022-09-15 PROCEDURE — 43450 DILATE ESOPHAGUS 1/MULT PASS: CPT | Performed by: INTERNAL MEDICINE

## 2022-09-15 NOTE — SERVICEHPINOTES
reflux, difficulty swallowing, much improved with omeprazole 40 mg daily some upset stomach since cholycystectomy

## 2022-11-10 DIAGNOSIS — I10 HYPERTENSION, UNSPECIFIED TYPE: Primary | ICD-10-CM

## 2022-11-11 LAB
ALBUMIN SERPL-MCNC: 4.3 G/DL (ref 3.5–5.2)
ALBUMIN/GLOB SERPL: 1.9 G/DL
ALP SERPL-CCNC: 88 U/L (ref 39–117)
ALT SERPL-CCNC: 19 U/L (ref 1–33)
APPEARANCE UR: CLEAR
AST SERPL-CCNC: 21 U/L (ref 1–32)
BACTERIA #/AREA URNS HPF: NORMAL /HPF
BASOPHILS # BLD AUTO: 0.05 10*3/MM3 (ref 0–0.2)
BASOPHILS NFR BLD AUTO: 0.7 % (ref 0–1.5)
BILIRUB SERPL-MCNC: 0.6 MG/DL (ref 0–1.2)
BILIRUB UR QL STRIP: NEGATIVE
BUN SERPL-MCNC: 19 MG/DL (ref 8–23)
BUN/CREAT SERPL: 27.5 (ref 7–25)
CALCIUM SERPL-MCNC: 9.3 MG/DL (ref 8.6–10.5)
CASTS URNS QL MICRO: NORMAL /LPF
CHLORIDE SERPL-SCNC: 101 MMOL/L (ref 98–107)
CHOLEST SERPL-MCNC: 210 MG/DL (ref 0–200)
CO2 SERPL-SCNC: 29 MMOL/L (ref 22–29)
COLOR UR: YELLOW
CREAT SERPL-MCNC: 0.69 MG/DL (ref 0.57–1)
EGFRCR SERPLBLD CKD-EPI 2021: 94.7 ML/MIN/1.73
EOSINOPHIL # BLD AUTO: 0.2 10*3/MM3 (ref 0–0.4)
EOSINOPHIL NFR BLD AUTO: 2.8 % (ref 0.3–6.2)
EPI CELLS #/AREA URNS HPF: NORMAL /HPF (ref 0–10)
ERYTHROCYTE [DISTWIDTH] IN BLOOD BY AUTOMATED COUNT: 13.1 % (ref 12.3–15.4)
GLOBULIN SER CALC-MCNC: 2.3 GM/DL
GLUCOSE SERPL-MCNC: 100 MG/DL (ref 65–99)
GLUCOSE UR QL STRIP: NEGATIVE
HCT VFR BLD AUTO: 42.2 % (ref 34–46.6)
HDLC SERPL-MCNC: 51 MG/DL (ref 40–60)
HGB BLD-MCNC: 13.7 G/DL (ref 12–15.9)
HGB UR QL STRIP: NEGATIVE
IMM GRANULOCYTES # BLD AUTO: 0.03 10*3/MM3 (ref 0–0.05)
IMM GRANULOCYTES NFR BLD AUTO: 0.4 % (ref 0–0.5)
KETONES UR QL STRIP: NEGATIVE
LDLC SERPL CALC-MCNC: 134 MG/DL (ref 0–100)
LEUKOCYTE ESTERASE UR QL STRIP: NEGATIVE
LYMPHOCYTES # BLD AUTO: 2.27 10*3/MM3 (ref 0.7–3.1)
LYMPHOCYTES NFR BLD AUTO: 31.3 % (ref 19.6–45.3)
MCH RBC QN AUTO: 29.3 PG (ref 26.6–33)
MCHC RBC AUTO-ENTMCNC: 32.5 G/DL (ref 31.5–35.7)
MCV RBC AUTO: 90.2 FL (ref 79–97)
MICRO URNS: NORMAL
MICRO URNS: NORMAL
MONOCYTES # BLD AUTO: 0.72 10*3/MM3 (ref 0.1–0.9)
MONOCYTES NFR BLD AUTO: 9.9 % (ref 5–12)
NEUTROPHILS # BLD AUTO: 3.98 10*3/MM3 (ref 1.7–7)
NEUTROPHILS NFR BLD AUTO: 54.9 % (ref 42.7–76)
NITRITE UR QL STRIP: NEGATIVE
NRBC BLD AUTO-RTO: 0 /100 WBC (ref 0–0.2)
PH UR STRIP: 5.5 [PH] (ref 5–7.5)
PLATELET # BLD AUTO: 205 10*3/MM3 (ref 140–450)
POTASSIUM SERPL-SCNC: 4.4 MMOL/L (ref 3.5–5.2)
PROT SERPL-MCNC: 6.6 G/DL (ref 6–8.5)
PROT UR QL STRIP: NEGATIVE
RBC # BLD AUTO: 4.68 10*6/MM3 (ref 3.77–5.28)
RBC #/AREA URNS HPF: NORMAL /HPF (ref 0–2)
SODIUM SERPL-SCNC: 139 MMOL/L (ref 136–145)
SP GR UR STRIP: 1.02 (ref 1–1.03)
TRIGL SERPL-MCNC: 142 MG/DL (ref 0–150)
URINALYSIS REFLEX: NORMAL
UROBILINOGEN UR STRIP-MCNC: 0.2 MG/DL (ref 0.2–1)
VLDLC SERPL CALC-MCNC: 25 MG/DL (ref 5–40)
WBC # BLD AUTO: 7.25 10*3/MM3 (ref 3.4–10.8)
WBC #/AREA URNS HPF: NORMAL /HPF (ref 0–5)

## 2022-11-17 ENCOUNTER — OFFICE VISIT (OUTPATIENT)
Dept: INTERNAL MEDICINE | Facility: CLINIC | Age: 67
End: 2022-11-17

## 2022-11-17 VITALS
HEIGHT: 67 IN | TEMPERATURE: 97.9 F | HEART RATE: 73 BPM | DIASTOLIC BLOOD PRESSURE: 94 MMHG | SYSTOLIC BLOOD PRESSURE: 114 MMHG | OXYGEN SATURATION: 98 % | BODY MASS INDEX: 27.18 KG/M2 | WEIGHT: 173.2 LBS

## 2022-11-17 DIAGNOSIS — E78.5 DYSLIPIDEMIA: ICD-10-CM

## 2022-11-17 DIAGNOSIS — Z13.820 SCREENING FOR OSTEOPOROSIS: ICD-10-CM

## 2022-11-17 DIAGNOSIS — M70.61 TROCHANTERIC BURSITIS OF RIGHT HIP: ICD-10-CM

## 2022-11-17 DIAGNOSIS — M85.88 OTHER SPECIFIED DISORDERS OF BONE DENSITY AND STRUCTURE, OTHER SITE: ICD-10-CM

## 2022-11-17 DIAGNOSIS — K21.9 GASTROESOPHAGEAL REFLUX DISEASE, UNSPECIFIED WHETHER ESOPHAGITIS PRESENT: ICD-10-CM

## 2022-11-17 DIAGNOSIS — Z23 NEEDS FLU SHOT: ICD-10-CM

## 2022-11-17 DIAGNOSIS — J30.89 ENVIRONMENTAL AND SEASONAL ALLERGIES: ICD-10-CM

## 2022-11-17 DIAGNOSIS — Z00.01 ENCOUNTER FOR GENERAL ADULT MEDICAL EXAMINATION WITH ABNORMAL FINDINGS: Primary | ICD-10-CM

## 2022-11-17 DIAGNOSIS — J45.30 MILD PERSISTENT ASTHMA WITHOUT COMPLICATION: ICD-10-CM

## 2022-11-17 DIAGNOSIS — E66.3 OVERWEIGHT (BMI 25.0-29.9): ICD-10-CM

## 2022-11-17 PROCEDURE — 99397 PER PM REEVAL EST PAT 65+ YR: CPT | Performed by: INTERNAL MEDICINE

## 2022-11-17 PROCEDURE — 1170F FXNL STATUS ASSESSED: CPT | Performed by: INTERNAL MEDICINE

## 2022-11-17 PROCEDURE — 90662 IIV NO PRSV INCREASED AG IM: CPT | Performed by: INTERNAL MEDICINE

## 2022-11-17 PROCEDURE — G0439 PPPS, SUBSEQ VISIT: HCPCS | Performed by: INTERNAL MEDICINE

## 2022-11-17 PROCEDURE — 1159F MED LIST DOCD IN RCRD: CPT | Performed by: INTERNAL MEDICINE

## 2022-11-17 PROCEDURE — G0008 ADMIN INFLUENZA VIRUS VAC: HCPCS | Performed by: INTERNAL MEDICINE

## 2022-11-17 NOTE — PATIENT INSTRUCTIONS
Allergies: Spring season (see if can avoid severe sx and steroid use):  Singulair qhs, Zytec qAM- BID, Flonase daily OTC, Astepro daily OTC, nasal rinse daily, and Alaway eye drops.  Start prior to seasonal sx onset, ~ March    Strongly advised to obtain COVID updated booster.       Medicare Wellness  Personal Prevention Plan of Service     Date of Office Visit:    Encounter Provider:  Paolo Dc MD  Place of Service:  [facility]  Patient Name: Radha Rivera  :  1955    As part of the Medicare Wellness portion of your visit today, we are providing you with this personalized preventive plan of services (PPPS). This plan is based upon recommendations of the [place] Preventive Services Task Force (USPSTF) and the Advisory Committee on Immunization Practices (ACIP).    This lists the preventive care services that should be considered, and provides dates of when you are due. Items listed as completed are up-to-date and do not require any further intervention.    Health Maintenance   Topic Date Due    DXA SCAN  Never done    COVID-19 Vaccine (3 - Booster for Pfizer series) 2021    MAMMOGRAM  2023    ANNUAL WELLNESS VISIT  2023    PAP SMEAR  2024    COLORECTAL CANCER SCREENING  2026    TDAP/TD VACCINES (3 - Td or Tdap) 2030    HEPATITIS C SCREENING  Completed    INFLUENZA VACCINE  Completed    Pneumococcal Vaccine 65+  Completed       Orders Placed This Encounter   Procedures    DEXA Bone Density Axial     Standing Status:   Future     Standing Expiration Date:   2023     Order Specific Question:   Is patient taking or have taken long term Glucocorticoid (steroids)?     Answer:   No     Order Specific Question:   Does the patient have rheumatoid arthritis?     Answer:   No     Order Specific Question:   Does the patient have secondary osteoporosis?     Answer:   No     Order Specific Question:   Reason for Exam:     Answer:   Screen  osteoporosis     Order Specific Question:   Does this patient have a diabetic monitoring/medication delivering device on?     Answer:   No     Order Specific Question:   Release to patient     Answer:   Routine Release    Fluzone High-Dose 65+yrs       Return in about 6 months (around 5/17/2023) for Recheck.

## 2022-11-17 NOTE — PROGRESS NOTES
"Subjective       Chief Complaint   Patient presents with   • Medicare Wellness-subsequent     Review of Medical Issues       HPI:  Radha Rivera is a 67 y.o. female RTC in yearly CPE/ AWV, review of medical issues:  1.  GERD/ gastritis - 'I am good.  Feeling much better'.  Had EGD after our last visit and got good path report. Got new med from GI but \"I did not take it\".  Not sure what new med was.  Is on PPI higher dose daily. All discomfort is gone.  NO issues swallowing, has fully resolved.   2. Asthma and Allergies - long hx, adult onset only. Was on immunotherapy shots weekly, but stopped after had . Was on one year and got up to red vial but did not get to maintenance for very long (had itching in roof of mouth with that dosing).  Told had to start over and decided not to start over. 'does not want to start'.  Recalls get bronchitis annually in Fall and had event last week, resolved.  Has need for 'prednisone' in April and May with profound eye sx.  Told in past to take Singulair, 2 Claritin in AM, Flonase. Never used nasal antiH in past.   Felt like had same sx on shots in Spring as had in past off shots.  'I feel like I dont want to do that'.    3. Hx of Hypothyroidism, though (-) anti-TPO Ab - off levothyroxine for > 3  Years now.    4. Hx of Carcinoma in situ of cervix - in , s/p D & C,  no recurrence. UTD Dr. Pablo ~10/2022 for annual Pap.  Negative results.   5. R hip trochanteric bursitis/ gluteal tendonitis - noted in last year with prolonged sitting. Is doing b jonny now.  Started back walking now.  'I am OK'. Feels like needs to loose some weight.   6. HM - needs flu shot today; not willing to get COVID booster as feels like does not trust.     The following portions of the patient's history were reviewed and updated as appropriate: allergies, current medications, past family history, past medical history, past social history, past surgical history and problem list.    Review of Systems "   Constitutional: Negative for chills, fever, malaise/fatigue, weight gain and weight loss.   HENT: Negative for congestion, hearing loss, odynophagia and sore throat.    Eyes: Negative for discharge, double vision, pain and redness.        Last eye exam ~10/2022; wears readers     Cardiovascular: Negative for chest pain, dyspnea on exertion, irregular heartbeat, leg swelling, near-syncope, palpitations and syncope.   Respiratory: Negative for cough (resolving from bronchitis ) and shortness of breath.    Endocrine: Negative for polydipsia, polyphagia and polyuria.   Hematologic/Lymphatic: Negative for bleeding problem. Does not bruise/bleed easily.   Skin: Positive for suspicious lesions ('knot on R palm'  NO pain. NO skin changes. ). Negative for rash.   Musculoskeletal: Negative for joint pain, joint swelling, muscle cramps and muscle weakness.   Gastrointestinal: Negative for constipation, diarrhea, dysphagia, heartburn, nausea and vomiting.   Genitourinary: Negative for dysuria, frequency, hematuria, hesitancy and incomplete emptying.   Neurological: Negative for dizziness, headaches and light-headedness.   Psychiatric/Behavioral: Negative for depression. The patient does not have insomnia and is not nervous/anxious.    Allergic/Immunologic: Positive for environmental allergies. Negative for persistent infections.       Patient Care Team:  Paolo Dc MD as PCP - General (Internal Medicine)    Recent Hospitalizations: No recent hospitalization(s).    Depression Screen:   PHQ-2/PHQ-9 Depression Screening 11/17/2022   Retired PHQ-9 Total Score -   Retired Total Score -   Little Interest or Pleasure in Doing Things 0-->not at all   Feeling Down, Depressed or Hopeless 0-->not at all   PHQ-9: Brief Depression Severity Measure Score 0       Functional and Cognitive Screening:  Functional & Cognitive Status 11/17/2022   Do you have difficulty preparing food and eating? No   Do you have difficulty bathing yourself,  "getting dressed or grooming yourself? No   Do you have difficulty using the toilet? No   Do you have difficulty moving around from place to place? No   Do you have trouble with steps or getting out of a bed or a chair? No   Current Diet Well Balanced Diet   Dental Exam Up to date   Eye Exam Up to date   Exercise (times per week) 4 times per week   Current Exercises Include Walking;House Cleaning   Current Exercise Activities Include -   Do you need help using the phone?  No   Are you deaf or do you have serious difficulty hearing?  No   Do you need help with transportation? No   Do you need help shopping? No   Do you need help preparing meals?  No   Do you need help with housework?  No   Do you need help with laundry? No   Do you need help taking your medications? No   Do you need help managing money? No   Do you ever drive or ride in a car without wearing a seat belt? No   Have you felt unusual stress, anger or loneliness in the last month? No   Who do you live with? Spouse   If you need help, do you have trouble finding someone available to you? No   Have you been bothered in the last four weeks by sexual problems? No   Do you have difficulty concentrating, remembering or making decisions? No       Does the patient have evidence of cognitive impairment? no    Does not need ASA (and currently is not on it)    Vitals:    11/17/22 0858   BP: 114/94   BP Location: Left arm   Patient Position: Sitting   Cuff Size: Adult   Pulse: 73   Temp: 97.9 °F (36.6 °C)   TempSrc: Oral   SpO2: 98%   Weight: 78.6 kg (173 lb 3.2 oz)   Height: 170.2 cm (67\")       Body mass index is 27.13 kg/m².    Visual Acuity:  No results found.    Advanced Care Planning:  ACP discussion was held with the patient during this visit. Patient has an advance directive (not in EMR), copy requested.    Objective   Physical Exam  Vitals reviewed.   Constitutional:       General: She is not in acute distress.     Appearance: Normal appearance. She is " well-developed. She is not ill-appearing or toxic-appearing.   HENT:      Head: Normocephalic and atraumatic.      Right Ear: Hearing, tympanic membrane, ear canal and external ear normal. There is no impacted cerumen.      Left Ear: Hearing, tympanic membrane, ear canal and external ear normal. There is no impacted cerumen.      Nose: Nose normal.      Mouth/Throat:      Mouth: Mucous membranes are moist. No injury or oral lesions.      Dentition: Does not have dentures.      Tongue: No lesions.      Pharynx: Oropharynx is clear. Uvula midline. No pharyngeal swelling, oropharyngeal exudate, posterior oropharyngeal erythema or uvula swelling.   Eyes:      General: Lids are normal. No scleral icterus.        Right eye: No discharge.         Left eye: No discharge.      Extraocular Movements: Extraocular movements intact.      Conjunctiva/sclera: Conjunctivae normal.      Pupils: Pupils are equal, round, and reactive to light.   Neck:      Thyroid: No thyroid mass or thyromegaly.      Vascular: No carotid bruit.      Trachea: Trachea normal.   Cardiovascular:      Rate and Rhythm: Normal rate and regular rhythm.      Pulses:           Radial pulses are 2+ on the right side and 2+ on the left side.        Dorsalis pedis pulses are 2+ on the right side and 2+ on the left side.        Posterior tibial pulses are 2+ on the right side and 2+ on the left side.      Heart sounds: Normal heart sounds, S1 normal and S2 normal. No murmur heard.    No friction rub. No gallop.   Pulmonary:      Effort: Pulmonary effort is normal. No respiratory distress.      Breath sounds: Normal breath sounds. No wheezing, rhonchi or rales.   Abdominal:      General: Bowel sounds are normal. There is no distension.      Palpations: Abdomen is soft. There is no mass.      Tenderness: There is no abdominal tenderness. There is no guarding or rebound.   Musculoskeletal:         General: Normal range of motion.      Cervical back: Full passive  range of motion without pain. No rigidity. No muscular tenderness. Normal range of motion.      Right hip: No tenderness, bony tenderness or crepitus. Normal range of motion. Normal strength.      Left hip: No tenderness, bony tenderness or crepitus. Normal range of motion. Normal strength.      Right lower leg: No edema.      Left lower leg: No edema.      Right foot: Normal range of motion. No deformity or bunion.      Left foot: Normal range of motion. No deformity or bunion.   Feet:      Right foot:      Skin integrity: No ulcer, blister or skin breakdown.      Left foot:      Skin integrity: No ulcer, blister or skin breakdown.   Lymphadenopathy:      Cervical: No cervical adenopathy.      Upper Body:      Right upper body: No supraclavicular, axillary or pectoral adenopathy.      Left upper body: No supraclavicular, axillary or pectoral adenopathy.      Lower Body: No right inguinal adenopathy. No left inguinal adenopathy.   Skin:     General: Skin is warm and dry.      Findings: No rash.   Neurological:      Mental Status: She is alert and oriented to person, place, and time.      Cranial Nerves: No cranial nerve deficit.      Sensory: No sensory deficit.      Motor: No weakness, tremor, atrophy or abnormal muscle tone.      Gait: Gait normal.      Deep Tendon Reflexes: Reflexes are normal and symmetric.      Reflex Scores:       Patellar reflexes are 2+ on the right side and 2+ on the left side.       Achilles reflexes are 2+ on the right side and 2+ on the left side.  Psychiatric:         Mood and Affect: Mood normal.         Behavior: Behavior normal.         Thought Content: Thought content normal.         Compared to one year ago, the patient feels physical health is the same.  Compared to one year ago, the patient feels mental health is the same.    Reviewed chart for potential of high risk medication in the elderly: yes  Reviewed chart for potential of harmful drug interactions in the  elderly:yes    Identification of Risk Factors:  Risk factors include: Advance Directive Discussion  Breast Cancer/Mammogram Screening  Colon Cancer Screening  Depression/Dysphoria  Immunizations Discussed/Encouraged (specific immunizations; Tdap, Hepatitis A Vaccine/Series, Influenza, Pneumococcal 23, Shingrix and COVID19 )  Obesity/Overweight .    Patient Self-Management and Personalized Health Advice  The patient has been provided with information about: diet, exercise, weight management and mental health concerns and preventive services including:   · Annual Wellness Visit (AWV)  · Bone Density Measurements  · Colorectal Cancer Screening, Colonoscopy  · Influenza Vaccine and Administration  · Screening Mammography   · Screening Pap Tests.    Discussed the patient's BMI with her. The BMI is above average; BMI management plan is completed.    Assessment & Plan   Diagnoses and all orders for this visit:    1. Encounter for general adult medical examination with abnormal findings (Primary)  -     DEXA Bone Density Axial; Future    2. Overweight (BMI 25.0-29.9)    3. Mild persistent asthma without complication    4. Gastroesophageal reflux disease, unspecified whether esophagitis present    5. Environmental and seasonal allergies    6. Dyslipidemia    7. Trochanteric bursitis of right hip    8. Needs flu shot  -     Fluzone High-Dose 65+yrs    9. Screening for osteoporosis  -     DEXA Bone Density Axial; Future    10. Other specified disorders of bone density and structure, other site  -     DEXA Bone Density Axial; Future            Diagnoses and all orders for this visit:    Encounter for general adult medical examination with abnormal findings  -     DEXA Bone Density Axial; Future    Overweight (BMI 25.0-29.9)    Mild persistent asthma without complication    Gastroesophageal reflux disease, unspecified whether esophagitis present    Environmental and seasonal allergies    Dyslipidemia    Trochanteric bursitis of  right hip    Needs flu shot  -     Fluzone High-Dose 65+yrs    Screening for osteoporosis  -     DEXA Bone Density Axial; Future    Other specified disorders of bone density and structure, other site  -     DEXA Bone Density Axial; Future        Radha Rivera is a 67 y.o. female RTC in yearly CPE/ AWV, review of medical issues:  1.  GERD/ gastritis - acceleration in 2022 of abdominal discomfort and heartburn noted every since lap maulik in 2011.  Resolved on PPI daily. EGD 9/2022 with mild gastritis only.  Sucralfate pills PRN from GI, but not needed to use and dysphagia has fully resolved.  C/W PPI daily.  Weight loss advised.   2. Asthma and Seasonal Allergies - long hx, adult onset only. S/P immunotherapy weekly ~ 1 year, self D/C as felt like not helpful.  On Singulair daily and Xyzal in season.  Pt endorses hx of yearly steroid dosing for occular sx from allergy; d/w pt risks of this long term.  I d/w pt regimen to trial for Spring season to see if can avoid severe sx and steroid use:  Singulair qhs, Xyzal qAM- BID, Flonase daily OTC, Astepro daily OTC, nasal rinse daily, and Alaway eye drops.    --> Subacute bronchitis - seen at [facility] recently with dx of 'bronchitis', no dx of RAD noted.   I d/w pt risks of Abx and steroids, particularly if not indicated for viral URI issues.  Advised to RTC here if able for any future events.    3.  Hx of Hypothyroidism, though (-) anti-TPO Ab - off levothyroxine for > 3  Years now.  TSH remained normal.  Trend TSH next labs.   4. Hx of Carcinoma in situ of cervix - in 1977, s/p D & C,  no recurrence. UTD Dr. Pablo ~10/2022 for annual Pap.    5. Overweight, decline in HDL, IFG - remains with decline in activity, no weight loss. D/W pt need for resumption of exercise for weight loss; has plan to start back to exercise.  D/W pt labs as motivator. Trend numbers.   6. Hx of R hip trochanteric bursitis/ gluteal tendonitis - noted in 2021 with prolonged sitting. Largely resolved.  Restart exercise.   7. HM - labs d/w pt; Flu - today;  Prevnar/ Zostavax/ Tdap/ Shingrix/ Pvax/ COVID - UTD; COVID booster (long d/w pt on risk vs. Benefit) at pharmacy; C-scope (-) 11/2016 --> 10 years; Pap UTD 10/2022 --> annually with hx carcinoma in situ; Mammo (-) 1/2022;  DEXA due, ordered prior to f/u; Hep C Ab (-) 9/2016; add more exercise; Preventative care plan provided to pt at end of visit    Return in about 6 months (around 5/17/2023) for Recheck. (CMP, A1C, HDL, LDL, TSH)          Current Outpatient Medications:   •  Calcium-Phosphorus-Vitamin D 250-107-500 MG-MG-UNIT chewable tablet, Chew., Disp: , Rfl:   •  Cholecalciferol (VITAMIN D) 1000 UNITS tablet, Take  by mouth., Disp: , Rfl:   •  montelukast (SINGULAIR) 10 MG tablet, TK 1 T PO HS, Disp: , Rfl: 11  •  omeprazole (priLOSEC) 40 MG capsule, Take 1 capsule by mouth Daily., Disp: 90 capsule, Rfl: 0  •  PROAIR  (90 Base) MCG/ACT inhaler, INL 2 PFS PO Q 4 TO 6 H PRN, Disp: , Rfl: 11

## 2022-12-15 DIAGNOSIS — R13.19 ESOPHAGEAL DYSPHAGIA: ICD-10-CM

## 2022-12-15 DIAGNOSIS — R14.2 BELCHING: ICD-10-CM

## 2022-12-15 DIAGNOSIS — K21.9 GASTROESOPHAGEAL REFLUX DISEASE, UNSPECIFIED WHETHER ESOPHAGITIS PRESENT: ICD-10-CM

## 2022-12-15 RX ORDER — OMEPRAZOLE 40 MG/1
40 CAPSULE, DELAYED RELEASE ORAL DAILY
Qty: 90 CAPSULE | Refills: 2 | Status: SHIPPED | OUTPATIENT
Start: 2022-12-15

## 2023-02-01 ENCOUNTER — TRANSCRIBE ORDERS (OUTPATIENT)
Dept: ADMINISTRATIVE | Facility: HOSPITAL | Age: 68
End: 2023-02-01
Payer: MEDICARE

## 2023-02-01 DIAGNOSIS — Z12.31 SCREENING MAMMOGRAM, ENCOUNTER FOR: Primary | ICD-10-CM

## 2023-02-09 ENCOUNTER — HOSPITAL ENCOUNTER (OUTPATIENT)
Dept: MAMMOGRAPHY | Facility: HOSPITAL | Age: 68
Discharge: HOME | End: 2023-02-09
Admitting: INTERNAL MEDICINE
Payer: MEDICARE

## 2023-02-09 DIAGNOSIS — Z12.31 SCREENING MAMMOGRAM, ENCOUNTER FOR: ICD-10-CM

## 2023-02-09 PROCEDURE — 77063 BREAST TOMOSYNTHESIS BI: CPT

## 2023-02-09 PROCEDURE — 77067 SCR MAMMO BI INCL CAD: CPT

## 2023-04-16 ENCOUNTER — HOSPITAL ENCOUNTER (OUTPATIENT)
Dept: BONE DENSITY | Facility: HOSPITAL | Age: 68
Discharge: HOME | End: 2023-04-16
Admitting: INTERNAL MEDICINE
Payer: MEDICARE

## 2023-04-16 DIAGNOSIS — Z13.820 SCREENING FOR OSTEOPOROSIS: ICD-10-CM

## 2023-04-16 DIAGNOSIS — M85.88 OTHER SPECIFIED DISORDERS OF BONE DENSITY AND STRUCTURE, OTHER SITE: ICD-10-CM

## 2023-04-16 DIAGNOSIS — Z00.01 ENCOUNTER FOR GENERAL ADULT MEDICAL EXAMINATION WITH ABNORMAL FINDINGS: ICD-10-CM

## 2023-04-16 PROCEDURE — 77080 DXA BONE DENSITY AXIAL: CPT

## 2023-05-07 ENCOUNTER — HOSPITAL ENCOUNTER (EMERGENCY)
Facility: HOSPITAL | Age: 68
Discharge: HOME | End: 2023-05-07
Attending: STUDENT IN AN ORGANIZED HEALTH CARE EDUCATION/TRAINING PROGRAM | Admitting: STUDENT IN AN ORGANIZED HEALTH CARE EDUCATION/TRAINING PROGRAM
Payer: MEDICARE

## 2023-05-07 VITALS
WEIGHT: 168 LBS | BODY MASS INDEX: 26.37 KG/M2 | OXYGEN SATURATION: 95 % | DIASTOLIC BLOOD PRESSURE: 80 MMHG | HEART RATE: 79 BPM | TEMPERATURE: 97.5 F | HEIGHT: 67 IN | RESPIRATION RATE: 16 BRPM | SYSTOLIC BLOOD PRESSURE: 92 MMHG

## 2023-05-07 DIAGNOSIS — A08.4 VIRAL GASTROENTERITIS: Primary | ICD-10-CM

## 2023-05-07 LAB
ALBUMIN SERPL-MCNC: 4.4 G/DL (ref 3.5–5.2)
ALBUMIN/GLOB SERPL: 1.6 G/DL
ALP SERPL-CCNC: 77 U/L (ref 39–117)
ALT SERPL W P-5'-P-CCNC: 26 U/L (ref 1–33)
ANION GAP SERPL CALCULATED.3IONS-SCNC: 12.7 MMOL/L (ref 5–15)
AST SERPL-CCNC: 33 U/L (ref 1–32)
BASOPHILS # BLD AUTO: 0.02 10*3/MM3 (ref 0–0.2)
BASOPHILS NFR BLD AUTO: 0.3 % (ref 0–1.5)
BILIRUB SERPL-MCNC: 0.6 MG/DL (ref 0–1.2)
BUN SERPL-MCNC: 26 MG/DL (ref 8–23)
BUN/CREAT SERPL: 26.3 (ref 7–25)
CALCIUM SPEC-SCNC: 9.1 MG/DL (ref 8.6–10.5)
CHLORIDE SERPL-SCNC: 95 MMOL/L (ref 98–107)
CO2 SERPL-SCNC: 23.3 MMOL/L (ref 22–29)
CREAT SERPL-MCNC: 0.99 MG/DL (ref 0.57–1)
DEPRECATED RDW RBC AUTO: 44.1 FL (ref 37–54)
EGFRCR SERPLBLD CKD-EPI 2021: 61.9 ML/MIN/1.73
EOSINOPHIL # BLD AUTO: 0.01 10*3/MM3 (ref 0–0.4)
EOSINOPHIL NFR BLD AUTO: 0.2 % (ref 0.3–6.2)
ERYTHROCYTE [DISTWIDTH] IN BLOOD BY AUTOMATED COUNT: 13.1 % (ref 12.3–15.4)
FLUAV SUBTYP SPEC NAA+PROBE: NOT DETECTED
FLUBV RNA ISLT QL NAA+PROBE: NOT DETECTED
GLOBULIN UR ELPH-MCNC: 2.7 GM/DL
GLUCOSE SERPL-MCNC: 89 MG/DL (ref 65–99)
HCT VFR BLD AUTO: 42.7 % (ref 34–46.6)
HGB BLD-MCNC: 14.3 G/DL (ref 12–15.9)
IMM GRANULOCYTES # BLD AUTO: 0.01 10*3/MM3 (ref 0–0.05)
IMM GRANULOCYTES NFR BLD AUTO: 0.2 % (ref 0–0.5)
LIPASE SERPL-CCNC: 24 U/L (ref 13–60)
LYMPHOCYTES # BLD AUTO: 1.17 10*3/MM3 (ref 0.7–3.1)
LYMPHOCYTES NFR BLD AUTO: 19.1 % (ref 19.6–45.3)
MCH RBC QN AUTO: 30.1 PG (ref 26.6–33)
MCHC RBC AUTO-ENTMCNC: 33.5 G/DL (ref 31.5–35.7)
MCV RBC AUTO: 89.9 FL (ref 79–97)
MONOCYTES # BLD AUTO: 0.72 10*3/MM3 (ref 0.1–0.9)
MONOCYTES NFR BLD AUTO: 11.7 % (ref 5–12)
NEUTROPHILS NFR BLD AUTO: 4.21 10*3/MM3 (ref 1.7–7)
NEUTROPHILS NFR BLD AUTO: 68.5 % (ref 42.7–76)
PLATELET # BLD AUTO: 174 10*3/MM3 (ref 140–450)
PMV BLD AUTO: 10 FL (ref 6–12)
POTASSIUM SERPL-SCNC: 3.7 MMOL/L (ref 3.5–5.2)
PROT SERPL-MCNC: 7.1 G/DL (ref 6–8.5)
RBC # BLD AUTO: 4.75 10*6/MM3 (ref 3.77–5.28)
SARS-COV-2 RNA RESP QL NAA+PROBE: NOT DETECTED
SODIUM SERPL-SCNC: 131 MMOL/L (ref 136–145)
WBC NRBC COR # BLD: 6.14 10*3/MM3 (ref 3.4–10.8)

## 2023-05-07 PROCEDURE — 25010000002 KETOROLAC TROMETHAMINE PER 15 MG: Performed by: STUDENT IN AN ORGANIZED HEALTH CARE EDUCATION/TRAINING PROGRAM

## 2023-05-07 PROCEDURE — 85025 COMPLETE CBC W/AUTO DIFF WBC: CPT | Performed by: STUDENT IN AN ORGANIZED HEALTH CARE EDUCATION/TRAINING PROGRAM

## 2023-05-07 PROCEDURE — 25010000002 ONDANSETRON PER 1 MG: Performed by: STUDENT IN AN ORGANIZED HEALTH CARE EDUCATION/TRAINING PROGRAM

## 2023-05-07 PROCEDURE — 87636 SARSCOV2 & INF A&B AMP PRB: CPT | Performed by: STUDENT IN AN ORGANIZED HEALTH CARE EDUCATION/TRAINING PROGRAM

## 2023-05-07 PROCEDURE — 96375 TX/PRO/DX INJ NEW DRUG ADDON: CPT

## 2023-05-07 PROCEDURE — 80053 COMPREHEN METABOLIC PANEL: CPT | Performed by: STUDENT IN AN ORGANIZED HEALTH CARE EDUCATION/TRAINING PROGRAM

## 2023-05-07 PROCEDURE — 96374 THER/PROPH/DIAG INJ IV PUSH: CPT

## 2023-05-07 PROCEDURE — 83690 ASSAY OF LIPASE: CPT | Performed by: STUDENT IN AN ORGANIZED HEALTH CARE EDUCATION/TRAINING PROGRAM

## 2023-05-07 PROCEDURE — 99283 EMERGENCY DEPT VISIT LOW MDM: CPT

## 2023-05-07 RX ORDER — KETOROLAC TROMETHAMINE 30 MG/ML
15 INJECTION, SOLUTION INTRAMUSCULAR; INTRAVENOUS ONCE
Status: COMPLETED | OUTPATIENT
Start: 2023-05-07 | End: 2023-05-07

## 2023-05-07 RX ORDER — ONDANSETRON 4 MG/1
4 TABLET, ORALLY DISINTEGRATING ORAL EVERY 6 HOURS PRN
Qty: 30 TABLET | Refills: 0 | Status: SHIPPED | OUTPATIENT
Start: 2023-05-07

## 2023-05-07 RX ORDER — DICYCLOMINE HYDROCHLORIDE 10 MG/1
10 CAPSULE ORAL 3 TIMES DAILY PRN
Qty: 30 CAPSULE | Refills: 0 | Status: SHIPPED | OUTPATIENT
Start: 2023-05-07

## 2023-05-07 RX ORDER — ONDANSETRON 2 MG/ML
4 INJECTION INTRAMUSCULAR; INTRAVENOUS ONCE
Status: COMPLETED | OUTPATIENT
Start: 2023-05-07 | End: 2023-05-07

## 2023-05-07 RX ADMIN — ONDANSETRON 4 MG: 2 INJECTION INTRAMUSCULAR; INTRAVENOUS at 17:48

## 2023-05-07 RX ADMIN — KETOROLAC TROMETHAMINE 15 MG: 30 INJECTION, SOLUTION INTRAMUSCULAR at 17:48

## 2023-05-07 RX ADMIN — SODIUM CHLORIDE 1000 ML: 0.9 INJECTION, SOLUTION INTRAVENOUS at 17:47

## 2023-05-07 NOTE — FSED PROVIDER NOTE
Subjective   History of Present Illness  68-year-old female with past medical history of asthma presents to the emergency department with nausea, vomiting, abdominal cramping, and diarrhea.  She reports symptoms started approximately 24 hours ago and have been improving since they first started.  She notes that she has only had 1 small urinary void just prior to arrival and was worried that she might be dehydrated.  She denies any history of abdominal surgeries.  Last bowel movement was this morning.  No blood in stool.  [place] from [place].  She has no abdominal pain at this time.  She denies fever.  She has not yet taken any medications for symptoms.    History provided by:  Patient   used: No        Review of Systems   Constitutional: Negative for chills and fever.   HENT: Negative for congestion and sore throat.    Eyes: Negative for pain and redness.   Respiratory: Negative for cough and shortness of breath.    Cardiovascular: Negative for chest pain and palpitations.   Gastrointestinal: Positive for abdominal pain, diarrhea, nausea and vomiting.   Genitourinary: Negative for difficulty urinating and dysuria.        Decreased urination   Musculoskeletal: Negative for back pain and neck pain.   Skin: Negative for rash and wound.   Neurological: Negative for weakness, numbness and headaches.   All other systems reviewed and are negative.      Past Medical History:   Diagnosis Date   • Asthma    • Cancer    • Carcinoma in situ of cervix     IN    • Liver hemangioma     s/p imaging eval in past   • Overweight (BMI 25.0-29.9) 10/29/2019   • Pap smear abnormality of cervix with ASCUS favoring benign        • PMB (postmenopausal bleeding)        Allergies   Allergen Reactions   • Zostavax [Zoster Vaccine Live] Itching       Past Surgical History:   Procedure Laterality Date   • CATARACT EXTRACTION, BILATERAL Bilateral 2019   •  SECTION     •  14-Jun-2019 18:20 CHOLECYSTECTOMY  2016   • COLONOSCOPY N/A 11/7/2016    Procedure: COLONOSCOPY TO CECUM;  Surgeon: Aisha Partida MD;  Location: [facility] ENDOSCOPY;  Service:    • DILATATION AND CURETTAGE  1977   • DILATION AND CURETTAGE, DIAGNOSTIC / THERAPEUTIC      endometrial hyperplasia   • TONSILLECTOMY         Family History   Problem Relation Age of Onset   • Hypertension Mother    • Thyroid disease Mother    • Osteoporosis Mother    • Alcohol abuse Father    • Cancer Maternal Grandmother         breast cancer   • Breast cancer Maternal Grandmother    • Cancer Cousin         breast cancer - two cousins   • Breast cancer Cousin    • No Known Problems Son    • No Known Problems Daughter        Social History     Socioeconomic History   • Marital status:    • Number of children: 2   Tobacco Use   • Smoking status: Never     Passive exposure: Never   • Smokeless tobacco: Never   Vaping Use   • Vaping Use: Never used   Substance and Sexual Activity   • Alcohol use: Yes     Comment: 2-4 glasses wine/ week   • Drug use: No   • Sexual activity: Yes     Partners: Male     Comment:  only; HPV in past           Objective   Physical Exam  Vitals and nursing note reviewed.   Constitutional:       General: She is not in acute distress.     Appearance: Normal appearance.   HENT:      Head: Normocephalic and atraumatic.      Mouth/Throat:      Mouth: Mucous membranes are moist.   Eyes:      Extraocular Movements: Extraocular movements intact.      Conjunctiva/sclera: Conjunctivae normal.      Pupils: Pupils are equal, round, and reactive to light.   Cardiovascular:      Rate and Rhythm: Normal rate and regular rhythm.      Pulses: Normal pulses.      Heart sounds: Normal heart sounds.   Pulmonary:      Effort: Pulmonary effort is normal. No respiratory distress.      Breath sounds: Normal breath sounds.   Abdominal:      General: There is no distension.      Palpations: Abdomen is soft.      Tenderness: There is no abdominal  tenderness.      Comments: No tenderness to palpation   Musculoskeletal:         General: Normal range of motion.      Cervical back: Normal range of motion and neck supple.   Skin:     General: Skin is warm.   Neurological:      General: No focal deficit present.      Mental Status: She is alert.   Psychiatric:         Mood and Affect: Mood normal.         Behavior: Behavior normal.         Procedures           ED Course                                           Medical Decision Making  15-year-old female presents the emergency department nausea, vomiting, diarrhea, and abdominal cramping.  She has benign abdominal exam at time of presentation to the emergency department.  This likely represents viral gastroenteritis/food poisoning.  Differential diagnosis includes dehydration, UTI, pyelonephritis, kidney stone, other intra-abdominal infection, others.  Will give IV fluids, Toradol, and Zofran for symptoms.    Patient reports significant improvement in symptoms after stay in the emergency department and ED medications.  She is tolerating p.o. prior to discharge.  She states she feels comfortable going home.  On repeat abdominal exam at time of discharge, the patient has no abdominal tenderness or distention.  Counseled to follow-up closely with her primary care physician.  Return to the ED for any new or worsening symptoms, particularly worsening focal pain, nausea, vomiting, fever, or any other concerning symptoms.  Patient expressed understanding agreement this plan.  Patient discharged home.    Viral gastroenteritis: acute illness or injury  Amount and/or Complexity of Data Reviewed  Labs: ordered.      Risk  Prescription drug management.          Final diagnoses:   Viral gastroenteritis       ED Disposition  ED Disposition     ED Disposition   Discharge    Condition   Stable    Comment   --             Paolo Dc MD  [address] [address] [phone]    Schedule an appointment  as soon as possible for a visit in 3 days      [facility]  [address] [facility] [address]    As needed, If symptoms worsen         Medication List      New Prescriptions    dicyclomine 10 MG capsule  Commonly known as: BENTYL  Take 1 capsule by mouth 3 (Three) Times a Day As Needed (Abdominal spasms).     ondansetron ODT 4 MG disintegrating tablet  Commonly known as: ZOFRAN-ODT  Place 1 tablet on the tongue Every 6 (Six) Hours As Needed for Nausea or Vomiting.           Where to Get Your Medications      These medications were sent to [id number] - [place], [place] - [address] [address] [facility] - [phone] [facility] [phone]   [address] [facility] [address]    Phone: [phone]   · dicyclomine 10 MG capsule  · ondansetron ODT 4 MG disintegrating tablet

## 2023-05-17 DIAGNOSIS — R79.9 ABNORMAL BLOOD FINDINGS: Primary | ICD-10-CM

## 2023-05-18 DIAGNOSIS — R79.9 ABNORMAL FINDING OF BLOOD CHEMISTRY: Primary | ICD-10-CM

## 2023-05-18 DIAGNOSIS — R73.03 PREDIABETES: ICD-10-CM

## 2023-05-18 LAB
ALBUMIN SERPL-MCNC: 4.1 G/DL (ref 3.5–5.2)
ALBUMIN/GLOB SERPL: 2 G/DL
ALP SERPL-CCNC: 68 U/L (ref 39–117)
ALT SERPL-CCNC: 23 U/L (ref 1–33)
AST SERPL-CCNC: 18 U/L (ref 1–32)
BILIRUB SERPL-MCNC: 0.4 MG/DL (ref 0–1.2)
BUN SERPL-MCNC: 14 MG/DL (ref 8–23)
BUN/CREAT SERPL: 25.5 (ref 7–25)
CALCIUM SERPL-MCNC: 9.4 MG/DL (ref 8.6–10.5)
CHLORIDE SERPL-SCNC: 105 MMOL/L (ref 98–107)
CO2 SERPL-SCNC: 30 MMOL/L (ref 22–29)
CREAT SERPL-MCNC: 0.55 MG/DL (ref 0.57–1)
EGFRCR SERPLBLD CKD-EPI 2021: 99.4 ML/MIN/1.73
GLOBULIN SER CALC-MCNC: 2.1 GM/DL
GLUCOSE SERPL-MCNC: 94 MG/DL (ref 65–99)
HBA1C MFR BLD: 5.3 % (ref 4.8–5.6)
HDLC SERPL-MCNC: 41 MG/DL (ref 40–60)
LDLC SERPL DIRECT ASSAY-MCNC: 115 MG/DL (ref 0–100)
POTASSIUM SERPL-SCNC: 4.5 MMOL/L (ref 3.5–5.2)
PROT SERPL-MCNC: 6.2 G/DL (ref 6–8.5)
SODIUM SERPL-SCNC: 143 MMOL/L (ref 136–145)
TSH SERPL DL<=0.005 MIU/L-ACNC: 3.07 UIU/ML (ref 0.27–4.2)

## 2023-05-25 ENCOUNTER — OFFICE VISIT (OUTPATIENT)
Dept: INTERNAL MEDICINE | Facility: CLINIC | Age: 68
End: 2023-05-25
Payer: MEDICARE

## 2023-05-25 VITALS
OXYGEN SATURATION: 95 % | HEIGHT: 67 IN | WEIGHT: 176.3 LBS | SYSTOLIC BLOOD PRESSURE: 136 MMHG | DIASTOLIC BLOOD PRESSURE: 84 MMHG | TEMPERATURE: 98.3 F | BODY MASS INDEX: 27.67 KG/M2 | HEART RATE: 84 BPM

## 2023-05-25 DIAGNOSIS — J30.89 ENVIRONMENTAL AND SEASONAL ALLERGIES: ICD-10-CM

## 2023-05-25 DIAGNOSIS — R73.01 IFG (IMPAIRED FASTING GLUCOSE): ICD-10-CM

## 2023-05-25 DIAGNOSIS — E66.3 OVERWEIGHT (BMI 25.0-29.9): ICD-10-CM

## 2023-05-25 DIAGNOSIS — J45.30 MILD PERSISTENT ASTHMA WITHOUT COMPLICATION: Primary | ICD-10-CM

## 2023-05-25 RX ORDER — LEVOCETIRIZINE DIHYDROCHLORIDE 5 MG/1
5 TABLET, FILM COATED ORAL EVERY EVENING
COMMUNITY

## 2023-05-25 NOTE — PROGRESS NOTES
"Hyperlipidemia (6 month f/u)      HPI  Radha Rivera is a 68 y.o. female RTC in f/u:  Asthma and Seasonal Allergies - long hx, adult onset only. S/P immunotherapy weekly ~ 1 year, self D/C as felt like not helpful.   Has been remodeling house with new livia. Dust has been making it harder this year.  Notes that really only April and May will have asthma issues.  Started eye drops this year, topical steroids around eyes, and Claritin daily and got through season without prednisone.  Albuterol use this year was two times daily, now off that regimen. Not used daily inhaler in season in past in a while, but did use Symbicort in past recall. Prednisone use in past would be 'when the eyes got so bad'.     Overweight, decline in HDL, IFG - has really been struggling with weight loss.  Eating about 4829-2634 kcal/ day.  Is avoiding red meat and avoids sweets.  Is walking 3x/ week, walks about 2 miles.  Did get back to gym and did some weights and strained L arm and now not been back.  'I dont know what else to do. I dont think it is the food I am eating'.        Review of Systems   Constitutional: Positive for malaise/fatigue (does 'run out of energy at times', toward end of day. ). Negative for chills, fever and weight loss.   HENT: Negative for congestion and sore throat.    Respiratory: Negative for cough, sleep disturbances due to breathing, snoring, sputum production and wheezing.    Genitourinary: Positive for nocturia (up once nightly).   Neurological: Negative for excessive daytime sleepiness, dizziness and light-headedness.   Psychiatric/Behavioral: Negative for altered mental status. The patient does not have insomnia (\"I sleep like a log'. ).    Allergic/Immunologic: Positive for environmental allergies. Negative for persistent infections.       The following portions of the patient's history were reviewed and updated as appropriate: allergies, current medications, past medical history, past social history and " "problem list.      Current Outpatient Medications:   •  Calcium-Phosphorus-Vitamin D 250-107-500 MG-MG-UNIT chewable tablet, Chew., Disp: , Rfl:   •  Cholecalciferol (VITAMIN D) 1000 UNITS tablet, Take  by mouth., Disp: , Rfl:   •  levocetirizine (XYZAL) 5 MG tablet, Take 1 tablet by mouth Every Evening., Disp: , Rfl:   •  montelukast (SINGULAIR) 10 MG tablet, TK 1 T PO HS, Disp: , Rfl: 11  •  omeprazole (priLOSEC) 40 MG capsule, Take 1 capsule by mouth Daily., Disp: 90 capsule, Rfl: 2  •  dicyclomine (BENTYL) 10 MG capsule, Take 1 capsule by mouth 3 (Three) Times a Day As Needed (Abdominal spasms)., Disp: 30 capsule, Rfl: 0  •  ondansetron ODT (ZOFRAN-ODT) 4 MG disintegrating tablet, Place 1 tablet on the tongue Every 6 (Six) Hours As Needed for Nausea or Vomiting., Disp: 30 tablet, Rfl: 0  •  PROAIR  (90 Base) MCG/ACT inhaler, INL 2 PFS PO Q 4 TO 6 H PRN, Disp: , Rfl: 11    Vitals:    05/25/23 1028   BP: 136/84   BP Location: Left arm   Patient Position: Sitting   Cuff Size: Adult   Pulse: 84   Temp: 98.3 °F (36.8 °C)   TempSrc: Oral   SpO2: 95%   Weight: 80 kg (176 lb 4.8 oz)   Height: 170.2 cm (67\")     Body mass index is 27.61 kg/m².      Physical Exam  Constitutional:       General: She is not in acute distress.     Appearance: Normal appearance. She is normal weight. She is not ill-appearing or toxic-appearing.   HENT:      Head: Normocephalic and atraumatic.      Mouth/Throat:      Mouth: Mucous membranes are moist.      Pharynx: Oropharynx is clear. No oropharyngeal exudate.      Comments: Class II-III Mallampati with somewhat narrow airway    Eyes:      General: No scleral icterus.     Conjunctiva/sclera: Conjunctivae normal.      Pupils: Pupils are equal, round, and reactive to light.   Neck:      Vascular: No carotid bruit.   Cardiovascular:      Rate and Rhythm: Normal rate and regular rhythm.      Heart sounds: No murmur heard.    No friction rub. No gallop.   Pulmonary:      Effort: Pulmonary " effort is normal. No respiratory distress.      Breath sounds: No wheezing, rhonchi or rales.   Musculoskeletal:      Cervical back: Normal range of motion and neck supple.      Right lower leg: No edema.      Left lower leg: No edema.   Neurological:      General: No focal deficit present.      Mental Status: She is alert.      Cranial Nerves: No cranial nerve deficit.      Gait: Gait normal.   Psychiatric:         Mood and Affect: Mood normal.         Behavior: Behavior normal.         Thought Content: Thought content normal.         Assessment/ Plan  Diagnoses and all orders for this visit:    Mild persistent asthma without complication    Environmental and seasonal allergies    Overweight (BMI 25.0-29.9)    IFG (impaired fasting glucose)    Other orders  -     levocetirizine (XYZAL) 5 MG tablet; Take 1 tablet by mouth Every Evening.        Return in 6 months (on 11/25/2023) for Annual physical.      Discussion:  Radha Rivera is a 68 y.o. female RTC in f/u:  1.  GERD/ gastritis acceleration in 2022 of abdominal discomfort and heartburn noted every since lap maulik in 2011.  Resolved on PPI daily - C/W PPI daily.    2.  Seasonal Allergies; long hx, adult onset only. S/P immunotherapy weekly ~ 1 year, self D/C as felt like not helpful - pt elected to not return to allergist this year as managed allergy sx with montelukast and OTC anti-H/ ocular sx with OTC drops and steroid cream around eyes. Preference, after our discussion, to avoid oral steroids annually (trigger has been severe ocular sx in past with allergist).   Will c/w current seasonal med plan for allergy sx control in Spring.   3. Asthma, moderate to severe persistent isolated to Spring season - ESPERANZA today, but used albuterol BID in Spring with allergy sx flare.  I had good d/w pt today about avoiding oral steroids annually if able, but will really need to consider starting allergy regimen AND ICS in ~March of year to prevent sx flare and need to consider  oral steroids. Will d/w pt at annual visit in 12/2023 and ERx ICS option for use next Spring. Pt agreeable to plan.   4.  Hx of Hypothyroidism, though (-) anti-TPO Ab - off levothyroxine for > 3  Years now.  TSH remains normal.    5. Overweight, decline in HDL, IFG - struggling with weight loss, but blood sugar profile normal and LDL improved today.   C/W calorie control of ~9601-1053 kcal/ day, but will need to mix up exercise routine with resistance training in addition to walking 3x/ week.  Pt planning augment regimen. Trend numbers and weight.   --> Low risk LAZARO hx today, but pt will ask family to observe as issue could be confounder for weight loss efforts above.     RTC as planned 6 months CPE, F labs prior

## 2023-06-09 ENCOUNTER — APPOINTMENT (OUTPATIENT)
Dept: CT IMAGING | Facility: HOSPITAL | Age: 68
End: 2023-06-09
Payer: MEDICARE

## 2023-06-09 ENCOUNTER — APPOINTMENT (OUTPATIENT)
Dept: GENERAL RADIOLOGY | Facility: HOSPITAL | Age: 68
End: 2023-06-09
Payer: MEDICARE

## 2023-06-09 ENCOUNTER — APPOINTMENT (OUTPATIENT)
Dept: MRI IMAGING | Facility: HOSPITAL | Age: 68
End: 2023-06-09
Payer: MEDICARE

## 2023-06-09 ENCOUNTER — HOSPITAL ENCOUNTER (OUTPATIENT)
Facility: HOSPITAL | Age: 68
Setting detail: OBSERVATION
Discharge: HOME | End: 2023-06-10
Attending: EMERGENCY MEDICINE | Admitting: INTERNAL MEDICINE
Payer: MEDICARE

## 2023-06-09 DIAGNOSIS — R42 DIZZINESS: Primary | ICD-10-CM

## 2023-06-09 LAB
ALBUMIN SERPL-MCNC: 4.4 G/DL (ref 3.5–5.2)
ALBUMIN/GLOB SERPL: 1.6 G/DL
ALP SERPL-CCNC: 93 U/L (ref 39–117)
ALT SERPL W P-5'-P-CCNC: 20 U/L (ref 1–33)
ANION GAP SERPL CALCULATED.3IONS-SCNC: 10 MMOL/L (ref 5–15)
APTT PPP: 28.9 SECONDS (ref 22.7–35.4)
AST SERPL-CCNC: 21 U/L (ref 1–32)
BASOPHILS # BLD AUTO: 0.01 10*3/MM3 (ref 0–0.2)
BASOPHILS NFR BLD AUTO: 0.2 % (ref 0–1.5)
BILIRUB SERPL-MCNC: 0.4 MG/DL (ref 0–1.2)
BUN SERPL-MCNC: 20 MG/DL (ref 8–23)
BUN/CREAT SERPL: 32.8 (ref 7–25)
CALCIUM SPEC-SCNC: 9.4 MG/DL (ref 8.6–10.5)
CHLORIDE SERPL-SCNC: 104 MMOL/L (ref 98–107)
CO2 SERPL-SCNC: 25 MMOL/L (ref 22–29)
CREAT BLDA-MCNC: 0.48 MG/DL
CREAT SERPL-MCNC: 0.61 MG/DL (ref 0.57–1)
DEPRECATED RDW RBC AUTO: 46 FL (ref 37–54)
EGFRCR SERPLBLD CKD-EPI 2021: 102.7 ML/MIN/1.73
EGFRCR SERPLBLD CKD-EPI 2021: 96.9 ML/MIN/1.73
EOSINOPHIL # BLD AUTO: 0.1 10*3/MM3 (ref 0–0.4)
EOSINOPHIL NFR BLD AUTO: 1.7 % (ref 0.3–6.2)
ERYTHROCYTE [DISTWIDTH] IN BLOOD BY AUTOMATED COUNT: 13.5 % (ref 12.3–15.4)
GLOBULIN UR ELPH-MCNC: 2.8 GM/DL
GLUCOSE BLDC GLUCOMTR-MCNC: 87 MG/DL (ref 70–130)
GLUCOSE SERPL-MCNC: 103 MG/DL (ref 65–99)
HCT VFR BLD AUTO: 42.9 % (ref 34–46.6)
HGB BLD-MCNC: 13.8 G/DL (ref 12–15.9)
HOLD SPECIMEN: NORMAL
HOLD SPECIMEN: NORMAL
IMM GRANULOCYTES # BLD AUTO: 0.02 10*3/MM3 (ref 0–0.05)
IMM GRANULOCYTES NFR BLD AUTO: 0.3 % (ref 0–0.5)
INR PPP: 1.1
LYMPHOCYTES # BLD AUTO: 2.07 10*3/MM3 (ref 0.7–3.1)
LYMPHOCYTES NFR BLD AUTO: 36.1 % (ref 19.6–45.3)
MCH RBC QN AUTO: 29.7 PG (ref 26.6–33)
MCHC RBC AUTO-ENTMCNC: 32.2 G/DL (ref 31.5–35.7)
MCV RBC AUTO: 92.5 FL (ref 79–97)
MONOCYTES # BLD AUTO: 0.53 10*3/MM3 (ref 0.1–0.9)
MONOCYTES NFR BLD AUTO: 9.2 % (ref 5–12)
NEUTROPHILS NFR BLD AUTO: 3 10*3/MM3 (ref 1.7–7)
NEUTROPHILS NFR BLD AUTO: 52.5 % (ref 42.7–76)
PLATELET # BLD AUTO: 214 10*3/MM3 (ref 140–450)
PMV BLD AUTO: 10 FL (ref 6–12)
POTASSIUM SERPL-SCNC: 4.1 MMOL/L (ref 3.5–5.2)
PROT SERPL-MCNC: 7.2 G/DL (ref 6–8.5)
PROTHROMBIN TIME: 12.2 SECONDS (ref 11–15)
QT INTERVAL: 372 MS
RBC # BLD AUTO: 4.64 10*6/MM3 (ref 3.77–5.28)
SODIUM SERPL-SCNC: 139 MMOL/L (ref 136–145)
TROPONIN T SERPL HS-MCNC: 6 NG/L
WBC NRBC COR # BLD: 5.73 10*3/MM3 (ref 3.4–10.8)
WHOLE BLOOD HOLD COAG: NORMAL
WHOLE BLOOD HOLD SPECIMEN: NORMAL

## 2023-06-09 PROCEDURE — 96360 HYDRATION IV INFUSION INIT: CPT

## 2023-06-09 PROCEDURE — 82948 REAGENT STRIP/BLOOD GLUCOSE: CPT

## 2023-06-09 PROCEDURE — G0378 HOSPITAL OBSERVATION PER HR: HCPCS

## 2023-06-09 PROCEDURE — 82565 ASSAY OF CREATININE: CPT | Performed by: EMERGENCY MEDICINE

## 2023-06-09 PROCEDURE — 25010000002 SODIUM CHLORIDE 0.9 % WITH KCL 20 MEQ 20-0.9 MEQ/L-% SOLUTION: Performed by: INTERNAL MEDICINE

## 2023-06-09 PROCEDURE — 85610 PROTHROMBIN TIME: CPT

## 2023-06-09 PROCEDURE — 93005 ELECTROCARDIOGRAM TRACING: CPT | Performed by: EMERGENCY MEDICINE

## 2023-06-09 PROCEDURE — 0042T HC CT CEREBRAL PERFUSION W/WO CONTRAST: CPT

## 2023-06-09 PROCEDURE — 71045 X-RAY EXAM CHEST 1 VIEW: CPT

## 2023-06-09 PROCEDURE — 85730 THROMBOPLASTIN TIME PARTIAL: CPT | Performed by: EMERGENCY MEDICINE

## 2023-06-09 PROCEDURE — 96361 HYDRATE IV INFUSION ADD-ON: CPT

## 2023-06-09 PROCEDURE — 93010 ELECTROCARDIOGRAM REPORT: CPT | Performed by: INTERNAL MEDICINE

## 2023-06-09 PROCEDURE — 25510000001 IOPAMIDOL PER 1 ML: Performed by: EMERGENCY MEDICINE

## 2023-06-09 PROCEDURE — 70496 CT ANGIOGRAPHY HEAD: CPT

## 2023-06-09 PROCEDURE — 84484 ASSAY OF TROPONIN QUANT: CPT | Performed by: EMERGENCY MEDICINE

## 2023-06-09 PROCEDURE — 85025 COMPLETE CBC W/AUTO DIFF WBC: CPT | Performed by: EMERGENCY MEDICINE

## 2023-06-09 PROCEDURE — 36415 COLL VENOUS BLD VENIPUNCTURE: CPT

## 2023-06-09 PROCEDURE — 99285 EMERGENCY DEPT VISIT HI MDM: CPT

## 2023-06-09 PROCEDURE — 70498 CT ANGIOGRAPHY NECK: CPT

## 2023-06-09 PROCEDURE — 70551 MRI BRAIN STEM W/O DYE: CPT

## 2023-06-09 PROCEDURE — 80053 COMPREHEN METABOLIC PANEL: CPT | Performed by: EMERGENCY MEDICINE

## 2023-06-09 RX ORDER — SODIUM CHLORIDE 0.9 % (FLUSH) 0.9 %
10 SYRINGE (ML) INJECTION EVERY 12 HOURS SCHEDULED
Status: DISCONTINUED | OUTPATIENT
Start: 2023-06-09 | End: 2023-06-10 | Stop reason: HOSPADM

## 2023-06-09 RX ORDER — SODIUM CHLORIDE 9 MG/ML
40 INJECTION, SOLUTION INTRAVENOUS AS NEEDED
Status: DISCONTINUED | OUTPATIENT
Start: 2023-06-09 | End: 2023-06-10 | Stop reason: HOSPADM

## 2023-06-09 RX ORDER — SODIUM CHLORIDE 0.9 % (FLUSH) 0.9 %
10 SYRINGE (ML) INJECTION AS NEEDED
Status: DISCONTINUED | OUTPATIENT
Start: 2023-06-09 | End: 2023-06-10 | Stop reason: HOSPADM

## 2023-06-09 RX ORDER — BISACODYL 10 MG
10 SUPPOSITORY, RECTAL RECTAL DAILY PRN
Status: DISCONTINUED | OUTPATIENT
Start: 2023-06-09 | End: 2023-06-10 | Stop reason: HOSPADM

## 2023-06-09 RX ORDER — PANTOPRAZOLE SODIUM 40 MG/1
40 TABLET, DELAYED RELEASE ORAL
Status: DISCONTINUED | OUTPATIENT
Start: 2023-06-10 | End: 2023-06-10 | Stop reason: HOSPADM

## 2023-06-09 RX ORDER — ACETAMINOPHEN 325 MG/1
650 TABLET ORAL EVERY 4 HOURS PRN
Status: DISCONTINUED | OUTPATIENT
Start: 2023-06-09 | End: 2023-06-10 | Stop reason: HOSPADM

## 2023-06-09 RX ORDER — SODIUM CHLORIDE AND POTASSIUM CHLORIDE 150; 900 MG/100ML; MG/100ML
100 INJECTION, SOLUTION INTRAVENOUS CONTINUOUS
Status: DISCONTINUED | OUTPATIENT
Start: 2023-06-09 | End: 2023-06-10 | Stop reason: HOSPADM

## 2023-06-09 RX ORDER — ASPIRIN 81 MG/1
81 TABLET ORAL DAILY
Status: DISCONTINUED | OUTPATIENT
Start: 2023-06-09 | End: 2023-06-10 | Stop reason: HOSPADM

## 2023-06-09 RX ORDER — POLYETHYLENE GLYCOL 3350 17 G/17G
17 POWDER, FOR SOLUTION ORAL DAILY PRN
Status: DISCONTINUED | OUTPATIENT
Start: 2023-06-09 | End: 2023-06-10 | Stop reason: HOSPADM

## 2023-06-09 RX ORDER — ACETAMINOPHEN 650 MG/1
650 SUPPOSITORY RECTAL EVERY 4 HOURS PRN
Status: DISCONTINUED | OUTPATIENT
Start: 2023-06-09 | End: 2023-06-10 | Stop reason: HOSPADM

## 2023-06-09 RX ORDER — ACETAMINOPHEN 160 MG/5ML
650 SOLUTION ORAL EVERY 4 HOURS PRN
Status: DISCONTINUED | OUTPATIENT
Start: 2023-06-09 | End: 2023-06-10 | Stop reason: HOSPADM

## 2023-06-09 RX ORDER — NITROGLYCERIN 0.4 MG/1
0.4 TABLET SUBLINGUAL
Status: DISCONTINUED | OUTPATIENT
Start: 2023-06-09 | End: 2023-06-10 | Stop reason: HOSPADM

## 2023-06-09 RX ORDER — BISACODYL 5 MG/1
5 TABLET, DELAYED RELEASE ORAL DAILY PRN
Status: DISCONTINUED | OUTPATIENT
Start: 2023-06-09 | End: 2023-06-10 | Stop reason: HOSPADM

## 2023-06-09 RX ORDER — AMOXICILLIN 250 MG
2 CAPSULE ORAL 2 TIMES DAILY
Status: DISCONTINUED | OUTPATIENT
Start: 2023-06-09 | End: 2023-06-10 | Stop reason: HOSPADM

## 2023-06-09 RX ADMIN — IOPAMIDOL 100 ML: 755 INJECTION, SOLUTION INTRAVENOUS at 12:10

## 2023-06-09 RX ADMIN — ASPIRIN 81 MG: 81 TABLET, COATED ORAL at 21:39

## 2023-06-09 RX ADMIN — Medication 10 ML: at 21:39

## 2023-06-09 RX ADMIN — POTASSIUM CHLORIDE AND SODIUM CHLORIDE 100 ML/HR: 900; 150 INJECTION, SOLUTION INTRAVENOUS at 21:48

## 2023-06-09 RX ADMIN — IOPAMIDOL 50 ML: 755 INJECTION, SOLUTION INTRAVENOUS at 12:10

## 2023-06-09 NOTE — NURSING NOTE
Dr. Flores paged for admission orders.    Dr. Flores returned page with new orders to consult neurology, do a swallow eval and give pt regular diet if passes, and a MRI without contrast

## 2023-06-09 NOTE — ED NOTES
Report given to SARINA Case RN at [facility]. Patients IV wrapped in coban and given instructions to go to [facility]  admissions. Pts  to drive her per MD Acosta.

## 2023-06-09 NOTE — FSED PROVIDER NOTE
Subjective   History of Present Illness  Is a 68-year-old female who presents complaining of dizziness.  States intermittently for the last 1 to 2 weeks she feels like the room is spinning when she sits up in the morning after lying down.  States this morning around 8:00 her arms began feeling weak.  States the left feels weaker than the right.  Reports intermittent tingling in her bilateral arms.  States she feels like she cannot speak normally.  Denies chest pain, shortness of breath, nausea, vomiting.  Denies any prior history of stroke, cardiac history.  Denies headache, vision changes.    Review of Systems   Neurological:  Positive for dizziness, weakness and light-headedness.   All other systems reviewed and are negative.    Past Medical History:   Diagnosis Date    Asthma     Cancer     Carcinoma in situ of cervix     IN     Liver hemangioma     s/p imaging eval in past    Overweight (BMI 25.0-29.9) 10/29/2019    Pap smear abnormality of cervix with ASCUS favoring benign     2007    PMB (postmenopausal bleeding)        Allergies   Allergen Reactions    Zostavax [Zoster Vaccine Live] Itching       Past Surgical History:   Procedure Laterality Date    CATARACT EXTRACTION, BILATERAL Bilateral 2019     SECTION      CHOLECYSTECTOMY  2016    COLONOSCOPY N/A 2016    Procedure: COLONOSCOPY TO CECUM;  Surgeon: Aisha Partida MD;  Location: [facility] ENDOSCOPY;  Service:     DILATATION AND CURETTAGE      DILATION AND CURETTAGE, DIAGNOSTIC / THERAPEUTIC      endometrial hyperplasia    TONSILLECTOMY         Family History   Problem Relation Age of Onset    Hypertension Mother     Thyroid disease Mother     Osteoporosis Mother     Alcohol abuse Father     Cancer Maternal Grandmother         breast cancer    Breast cancer Maternal Grandmother     Cancer Cousin         breast cancer - two cousins    Breast cancer Cousin     No Known Problems Son     No Known Problems Daughter        Social History      Socioeconomic History    Marital status:     Number of children: 2   Tobacco Use    Smoking status: Never     Passive exposure: Never    Smokeless tobacco: Never   Vaping Use    Vaping Use: Never used   Substance and Sexual Activity    Alcohol use: Yes     Comment: 2-4 glasses wine/ week    Drug use: No    Sexual activity: Yes     Partners: Male     Comment:  only; HPV in past           Objective   Physical Exam  Vitals and nursing note reviewed.   Constitutional:       Appearance: Normal appearance.   HENT:      Head: Normocephalic and atraumatic.   Cardiovascular:      Rate and Rhythm: Normal rate and regular rhythm.      Pulses: Normal pulses.      Heart sounds: Normal heart sounds.   Pulmonary:      Effort: Pulmonary effort is normal.      Breath sounds: Normal breath sounds.   Abdominal:      General: Abdomen is flat.      Palpations: Abdomen is soft.      Tenderness: There is no abdominal tenderness.   Musculoskeletal:      Cervical back: Normal range of motion and neck supple.   Skin:     General: Skin is warm and dry.      Capillary Refill: Capillary refill takes less than 2 seconds.   Neurological:      General: No focal deficit present.      Mental Status: She is alert and oriented to person, place, and time.      Sensory: No sensory deficit.      Motor: No weakness.       Procedures           ED Course  ED Course as of 06/09/23 1309   Fri Jun 09, 2023   1248 I did see this patient in conjunction with the MARCOS.  Patient has been having intermittent dizziness over the past several days.  Today at work she had issues using the computer keys, and may be some word finding difficulty.  She denies any lateralization of her symptoms however did feel that her upper extremities were weaker than normal.  The word finding difficulties in the upper extremity weakness has since subsided.  She drove here successfully and ambulated to the ER successfully.    Patient has some vague symptoms and is an  elderly woman.  Could be CVA related.  We recommended MRI of the brain and transfer/hospitalization.  She consented.  She wanted her  to drive her though.  I did discuss the risks with POV though [FK]      ED Course User Index  [FK] Wilder Acosta MD                                           Medical Decision Making  Head CT, CTA head and neck, chest x-ray showed no acute findings.  Laboratory findings reassuring.  Patient does report improvement in symptoms at this time.  She is negative for orthostatic findings.  Symptoms possibly due to hypertensive urgency as she was hypertensive upon presentation, TIA, CVA.  Discussed case with hospitalist for admission and further work-up.  Patient is agreeable to the plan.    Problems Addressed:  Dizziness: complicated acute illness or injury    Amount and/or Complexity of Data Reviewed  Labs: ordered.  Radiology: ordered.  ECG/medicine tests: ordered.    Risk  Prescription drug management.  Decision regarding hospitalization.        Final diagnoses:   Dizziness       ED Disposition  ED Disposition       ED Disposition   Decision to Admit    Condition   --    Comment   Level of Care: Telemetry [5]   Diagnosis: Dizziness [[id number]]   Admitting Physician: RELL MARTIN [[id number]]                 No follow-up provider specified.       Medication List      No changes were made to your prescriptions during this visit.

## 2023-06-09 NOTE — H&P
Internal medicine history and physical  INTERNAL MEDICINE   [facility]       Patient Identification:  Name: Radha Rivera  Age: 68 y.o.  Sex: female  :  1955  MRN: [id number]                   Primary Care Physician: Paolo Dc MD                               Date of admission:2023    Chief Complaint: Sudden onset of not feeling herself with everything around her not making sense with weakness and numbness of her arm while driving her car to work at around 730 or above this morning with continued symptoms of slurred speech and difficulty to understand people at workplace.    History of Present Illness:   Patient is a 68-year-old female who has past medical history as noted below but does not take any regular medication on regular basis except for omeprazole has been noticing that she is getting dizzy starting couple weeks ago especially when she gets up out of the bed.  She says that she has to sit up for some time until she settles down before she gets up and walk around otherwise she would be unsteady.  She did not have any fever or chills or any focal weakness of arm or legs.  In this background she is about to go to work and while driving started noticing numbness weakness and tingling of her arms and was having out of body feeling and felt weird.  She noticed that her speech is slow and she had a hard time understanding people and she felt fuzzy.  She was having difficulty finding words.  She is now feeling better and all these symptoms lasted for couple hours.  She reached her work and interacted with coworkers and then decided that this is not right and drove herself to [facility] urgent care.  Because of her symptoms she was switched to the emergency treatment pathway and had CT scan of her head and CT angio of the head and neck as per stroke protocol performed which was negative.  Patient patient was recommended hospitalization for neurological work-up and MRI.  Patient  tells me that she does not have issues with blood pressure and was recently seen by her doctor and everything checked out fine.  In the emergency room earlier today she was noted to have blood pressure of 180/95 with gradually improved to 160/88 when her orthostatics were done.  Patient did not have orthostatic drop in blood pressure.  Patient is currently feeling better and anxious to be discharged as soon as seen by neurology service as she has scheduled to attend a birthday party of her grandson at noon on Saturday, 6/10/2023.      Past Medical History:  Past Medical History:   Diagnosis Date   • Asthma    • Cancer    • Carcinoma in situ of cervix     IN    • Liver hemangioma     s/p imaging eval in past   • Overweight (BMI 25.0-29.9) 10/29/2019   • Pap smear abnormality of cervix with ASCUS favoring benign        • PMB (postmenopausal bleeding)      Past Surgical History:  Past Surgical History:   Procedure Laterality Date   • CATARACT EXTRACTION, BILATERAL Bilateral 2019   •  SECTION     • CHOLECYSTECTOMY     • COLONOSCOPY N/A 2016    Procedure: COLONOSCOPY TO CECUM;  Surgeon: Aisha Partida MD;  Location: [facility] ENDOSCOPY;  Service:    • DILATATION AND CURETTAGE     • DILATION AND CURETTAGE, DIAGNOSTIC / THERAPEUTIC      endometrial hyperplasia   • TONSILLECTOMY        Home Meds:  Medications Prior to Admission   Medication Sig Dispense Refill Last Dose   • omeprazole (priLOSEC) 40 MG capsule Take 1 capsule by mouth Daily. 90 capsule 2 Past Week   • Calcium-Phosphorus-Vitamin D 250-107-500 MG-MG-UNIT chewable tablet Chew.      • Cholecalciferol (VITAMIN D) 1000 UNITS tablet Take  by mouth.      • dicyclomine (BENTYL) 10 MG capsule Take 1 capsule by mouth 3 (Three) Times a Day As Needed (Abdominal spasms). 30 capsule 0    • levocetirizine (XYZAL) 5 MG tablet Take 1 tablet by mouth Every Evening.      • montelukast (SINGULAIR) 10 MG tablet TK 1 T PO HS  11    • ondansetron ODT  "(ZOFRAN-ODT) 4 MG disintegrating tablet Place 1 tablet on the tongue Every 6 (Six) Hours As Needed for Nausea or Vomiting. 30 tablet 0    • PROAIR  (90 Base) MCG/ACT inhaler INL 2 PFS PO Q 4 TO 6 H PRN  11      Current Meds:     Current Facility-Administered Medications:   •  sodium chloride 0.9 % flush 10 mL, 10 mL, Intravenous, PRN, Wilder Acosta MD  Allergies:  Allergies   Allergen Reactions   • Zostavax [Zoster Vaccine Live] Itching     Social History:   Social History     Tobacco Use   • Smoking status: Never     Passive exposure: Never   • Smokeless tobacco: Never   Substance Use Topics   • Alcohol use: Yes     Comment: 2-4 glasses wine/ week      Family History:  Family History   Problem Relation Age of Onset   • Hypertension Mother    • Thyroid disease Mother    • Osteoporosis Mother    • Alcohol abuse Father    • Cancer Maternal Grandmother         breast cancer   • Breast cancer Maternal Grandmother    • Cancer Cousin         breast cancer - two cousins   • Breast cancer Cousin    • No Known Problems Son    • No Known Problems Daughter           Review of Systems  See history of present illness and past medical history.  As described in the history of presenting illness.    Vitals:   /84 (BP Location: Right arm, Patient Position: Lying)   Pulse 87   Temp 98 °F (36.7 °C) (Oral)   Resp 17   Ht 170.2 cm (67\")   Wt 77.1 kg (170 lb)   SpO2 96%   BMI 26.63 kg/m²   I/O: No intake or output data in the 24 hours ending 06/09/23 5232  Exam:  Patient is examined using the personal protective equipment as per guidelines from infection control for this particular patient as enacted.  Hand washing was performed before and after patient interaction.  General Appearance:    Alert, cooperative, no distress, appears stated age   Head:    Normocephalic, without obvious abnormality, atraumatic   Eyes:    PERRL, conjunctiva/corneas clear, EOM's intact, both eyes   Ears:    Normal external ear canals, " both ears   Nose:   Nares normal, septum midline, mucosa normal, no drainage    or sinus tenderness   Throat:   Lips, tongue, gums normal; oral mucosa pink and moist   Neck:   Supple, symmetrical, trachea midline, no adenopathy;     thyroid:  no enlargement/tenderness/nodules; no carotid    bruit or JVD   Back:     Symmetric, no curvature, ROM normal, no CVA tenderness   Lungs:     Clear to auscultation bilaterally, respirations unlabored   Chest Wall:    No tenderness or deformity    Heart:    Regular rate and rhythm, S1 and S2 normal, no murmur, rub   or gallop   Abdomen:     Soft, non-tender, bowel sounds active all four quadrants,     no masses, no hepatomegaly, no splenomegaly   Extremities:   Extremities normal, atraumatic, no cyanosis or edema   Pulses:   Pulses palpable in all extremities; symmetric all extremities   Skin:   Skin color normal, Skin is warm and dry,  no rashes or palpable lesions   Neurologic:   CNII-XII intact, motor strength grossly intact, sensation grossly intact to light touch, no focal deficits noted       Data Review:      I reviewed the patient's new clinical results.  Results from last 7 days   Lab Units 06/09/23  1140   WBC 10*3/mm3 5.73   HEMOGLOBIN g/dL 13.8   PLATELETS 10*3/mm3 214     Results from last 7 days   Lab Units 06/09/23  1207 06/09/23  1140   SODIUM mmol/L  --  139   POTASSIUM mmol/L  --  4.1   CHLORIDE mmol/L  --  104   CO2 mmol/L  --  25.0   BUN mg/dL  --  20   CREATININE mg/dL 0.48 0.61   CALCIUM mg/dL  --  9.4   GLUCOSE mg/dL  --  103*     CT Angiogram Neck    Result Date: 6/9/2023   There is no CT evidence to suggest an acute completed infarct on the noncontrast head CT or CT perfusion study. No perfusion abnormality is identified on the CT perfusion exam. There is no evidence for large vessel occlusion. However, this study does not exclude an infarct and further evaluation is recommended with an MRI of the brain.  There is a mildly beaded appearance of the V3  segments of the vertebral arteries suggesting fibromuscular dysplasia.  Otherwise, the CT angiogram of the head and neck is unremarkable.  These findings and recommendations were discussed with Elle Kumar on 06/09/2023 at approximately 12:55 PM.  This report was finalized on 6/9/2023 3:34 PM by Dr. Timmy Jacques M.D.        XR Chest 1 View    Result Date: 6/9/2023  No evidence for acute pulmonary process. Tortuous aorta. Follow-up as clinically indicated.  This report was finalized on 6/9/2023 1:01 PM by Dr. Chucho Graham M.D.      CT Angiogram Head w AI Analysis of LVO    Result Date: 6/9/2023   There is no CT evidence to suggest an acute completed infarct on the noncontrast head CT or CT perfusion study. No perfusion abnormality is identified on the CT perfusion exam. There is no evidence for large vessel occlusion. However, this study does not exclude an infarct and further evaluation is recommended with an MRI of the brain.  There is a mildly beaded appearance of the V3 segments of the vertebral arteries suggesting fibromuscular dysplasia.  Otherwise, the CT angiogram of the head and neck is unremarkable.  These findings and recommendations were discussed with Elle Kumar on 06/09/2023 at approximately 12:55 PM.  This report was finalized on 6/9/2023 3:34 PM by Dr. Timmy Jacques M.D.      CT CEREBRAL PERFUSION WITH & WITHOUT CONTRAST    Result Date: 6/9/2023   There is no CT evidence to suggest an acute completed infarct on the noncontrast head CT or CT perfusion study. No perfusion abnormality is identified on the CT perfusion exam. There is no evidence for large vessel occlusion. However, this study does not exclude an infarct and further evaluation is recommended with an MRI of the brain.  There is a mildly beaded appearance of the V3 segments of the vertebral arteries suggesting fibromuscular dysplasia.  Otherwise, the CT angiogram of the head and neck is unremarkable.  These findings and  recommendations were discussed with Elle uKmar on 06/09/2023 at approximately 12:55 PM.  This report was finalized on 6/9/2023 3:34 PM by Dr. Timmy Jacques M.D.      ECG 12 Lead ED Triage Standing Order; Acute Stroke (Onset <24 hrs)   Final Result   HEART RATE= 95  bpm   RR Interval= 632  ms   TN Interval= 162  ms   P Horizontal Axis= 14  deg   P Front Axis= 68  deg   QRSD Interval= 97  ms   QT Interval= 372  ms   QRS Axis= 56  deg   T Wave Axis= 47  deg   - NORMAL ECG -   Sinus rhythm   No Prior Tracing for Comparison   Electronically Signed By: Clay Aviles ([facility]) 09-Jun-2023 13:23:29   Date and Time of Study: 2023-06-09 12:09:04            Assessment:  Active Hospital Problems    Diagnosis  POA   • **Dizziness [R42]  Yes   • GERD (gastroesophageal reflux disease) [K21.9]  Yes     Noted after lap maulik, accelerated in 2020. Empriric PPI trial in late 2020 resolved all sx.  Transitioned to H2 blocker PRN.   Recurrent sx in 2022, resolved with PPI. EGD 9/2022 with mild gastritis only.      • Mild persistent asthma without complication [J45.30]  Yes     Atopic hx; Spring seasonal flares of sx requiring oral steroids in past/ pt resistant to ICS due to cost in past           Medical decision making/care plan: See admitting orders  · Acute transient symptoms of confusion and word finding difficulty numbness and tingling of her arm and elevated blood pressure with negative work-up so far for any stroke or focal neurological lesions-this presentation could very well be TIA versus hypertensive encephalopathy.  Plan is to admit the patient monitor her blood pressure neurology consultation and get MRI of her brain.  Further neurochecks and telemetry monitoring and continuous pulse ox monitoring.  OT PT evaluation.  · Hypertension-monitor her blood pressure and consider introduction of antihypertensive if her blood pressure continues to be high given her neurological symptoms.  · History of asthma currently  asymptomatic does not take any medications  · History of gastroesophageal reflux disease currently on omeprazole.    Lavon Flores MD   6/9/2023  18:52 EDT    Parts of this note may be an electronic transcription/translation of spoken language to printed text using the Dragon dictation system.

## 2023-06-09 NOTE — PLAN OF CARE
Problem: Adult Inpatient Plan of Care  Goal: Optimal Comfort and Wellbeing  Outcome: Ongoing, Progressing   Goal Outcome Evaluation:

## 2023-06-10 ENCOUNTER — READMISSION MANAGEMENT (OUTPATIENT)
Dept: CALL CENTER | Facility: HOSPITAL | Age: 68
End: 2023-06-10
Payer: MEDICARE

## 2023-06-10 VITALS
RESPIRATION RATE: 18 BRPM | WEIGHT: 170 LBS | DIASTOLIC BLOOD PRESSURE: 92 MMHG | OXYGEN SATURATION: 92 % | TEMPERATURE: 98.1 F | HEART RATE: 82 BPM | BODY MASS INDEX: 26.68 KG/M2 | SYSTOLIC BLOOD PRESSURE: 170 MMHG | HEIGHT: 67 IN

## 2023-06-10 PROBLEM — R03.0 ELEVATED BLOOD PRESSURE READING: Status: ACTIVE | Noted: 2023-06-10

## 2023-06-10 PROBLEM — R42 DIZZINESS: Status: RESOLVED | Noted: 2023-06-09 | Resolved: 2023-06-10

## 2023-06-10 LAB
ALBUMIN SERPL-MCNC: 3.8 G/DL (ref 3.5–5.2)
ALBUMIN/GLOB SERPL: 1.6 G/DL
ALP SERPL-CCNC: 72 U/L (ref 39–117)
ALT SERPL W P-5'-P-CCNC: 21 U/L (ref 1–33)
ANION GAP SERPL CALCULATED.3IONS-SCNC: 4.9 MMOL/L (ref 5–15)
AST SERPL-CCNC: 19 U/L (ref 1–32)
BASOPHILS # BLD AUTO: 0.02 10*3/MM3 (ref 0–0.2)
BASOPHILS NFR BLD AUTO: 0.4 % (ref 0–1.5)
BILIRUB SERPL-MCNC: 0.4 MG/DL (ref 0–1.2)
BILIRUB UR QL STRIP: NEGATIVE
BUN SERPL-MCNC: 9 MG/DL (ref 8–23)
BUN/CREAT SERPL: 18.8 (ref 7–25)
CALCIUM SPEC-SCNC: 8.9 MG/DL (ref 8.6–10.5)
CHLORIDE SERPL-SCNC: 109 MMOL/L (ref 98–107)
CHOLEST SERPL-MCNC: 187 MG/DL (ref 0–200)
CLARITY UR: CLEAR
CO2 SERPL-SCNC: 28.1 MMOL/L (ref 22–29)
COLOR UR: YELLOW
CREAT SERPL-MCNC: 0.48 MG/DL (ref 0.57–1)
DEPRECATED RDW RBC AUTO: 39.1 FL (ref 37–54)
EGFRCR SERPLBLD CKD-EPI 2021: 102.7 ML/MIN/1.73
EOSINOPHIL # BLD AUTO: 0.13 10*3/MM3 (ref 0–0.4)
EOSINOPHIL NFR BLD AUTO: 2.6 % (ref 0.3–6.2)
ERYTHROCYTE [DISTWIDTH] IN BLOOD BY AUTOMATED COUNT: 12.4 % (ref 12.3–15.4)
GLOBULIN UR ELPH-MCNC: 2.4 GM/DL
GLUCOSE BLDC GLUCOMTR-MCNC: 92 MG/DL (ref 70–130)
GLUCOSE SERPL-MCNC: 99 MG/DL (ref 65–99)
GLUCOSE UR STRIP-MCNC: NEGATIVE MG/DL
HCT VFR BLD AUTO: 38.3 % (ref 34–46.6)
HDLC SERPL-MCNC: 49 MG/DL (ref 40–60)
HGB BLD-MCNC: 13 G/DL (ref 12–15.9)
HGB UR QL STRIP.AUTO: NEGATIVE
IMM GRANULOCYTES # BLD AUTO: 0.01 10*3/MM3 (ref 0–0.05)
IMM GRANULOCYTES NFR BLD AUTO: 0.2 % (ref 0–0.5)
KETONES UR QL STRIP: NEGATIVE
LDLC SERPL CALC-MCNC: 117 MG/DL (ref 0–100)
LDLC/HDLC SERPL: 2.35 {RATIO}
LEUKOCYTE ESTERASE UR QL STRIP.AUTO: NEGATIVE
LYMPHOCYTES # BLD AUTO: 1.4 10*3/MM3 (ref 0.7–3.1)
LYMPHOCYTES NFR BLD AUTO: 28.5 % (ref 19.6–45.3)
MCH RBC QN AUTO: 29.5 PG (ref 26.6–33)
MCHC RBC AUTO-ENTMCNC: 33.9 G/DL (ref 31.5–35.7)
MCV RBC AUTO: 87 FL (ref 79–97)
MONOCYTES # BLD AUTO: 0.37 10*3/MM3 (ref 0.1–0.9)
MONOCYTES NFR BLD AUTO: 7.5 % (ref 5–12)
NEUTROPHILS NFR BLD AUTO: 2.99 10*3/MM3 (ref 1.7–7)
NEUTROPHILS NFR BLD AUTO: 60.8 % (ref 42.7–76)
NITRITE UR QL STRIP: NEGATIVE
NRBC BLD AUTO-RTO: 0 /100 WBC (ref 0–0.2)
PH UR STRIP.AUTO: 8 [PH] (ref 5–8)
PLATELET # BLD AUTO: 191 10*3/MM3 (ref 140–450)
PMV BLD AUTO: 9.7 FL (ref 6–12)
POTASSIUM SERPL-SCNC: 4.4 MMOL/L (ref 3.5–5.2)
PROT SERPL-MCNC: 6.2 G/DL (ref 6–8.5)
PROT UR QL STRIP: NEGATIVE
RBC # BLD AUTO: 4.4 10*6/MM3 (ref 3.77–5.28)
SODIUM SERPL-SCNC: 142 MMOL/L (ref 136–145)
SP GR UR STRIP: 1.01 (ref 1–1.03)
TRIGL SERPL-MCNC: 115 MG/DL (ref 0–150)
TSH SERPL DL<=0.05 MIU/L-ACNC: 4.25 UIU/ML (ref 0.27–4.2)
UROBILINOGEN UR QL STRIP: NORMAL
VLDLC SERPL-MCNC: 21 MG/DL (ref 5–40)
WBC NRBC COR # BLD: 4.92 10*3/MM3 (ref 3.4–10.8)

## 2023-06-10 PROCEDURE — 80061 LIPID PANEL: CPT | Performed by: INTERNAL MEDICINE

## 2023-06-10 PROCEDURE — 85025 COMPLETE CBC W/AUTO DIFF WBC: CPT | Performed by: INTERNAL MEDICINE

## 2023-06-10 PROCEDURE — G0378 HOSPITAL OBSERVATION PER HR: HCPCS

## 2023-06-10 PROCEDURE — 81003 URINALYSIS AUTO W/O SCOPE: CPT | Performed by: PSYCHIATRY & NEUROLOGY

## 2023-06-10 PROCEDURE — 82948 REAGENT STRIP/BLOOD GLUCOSE: CPT

## 2023-06-10 PROCEDURE — 84443 ASSAY THYROID STIM HORMONE: CPT | Performed by: PSYCHIATRY & NEUROLOGY

## 2023-06-10 PROCEDURE — 96361 HYDRATE IV INFUSION ADD-ON: CPT

## 2023-06-10 PROCEDURE — 80053 COMPREHEN METABOLIC PANEL: CPT | Performed by: INTERNAL MEDICINE

## 2023-06-10 RX ORDER — MECLIZINE HYDROCHLORIDE 25 MG/1
25 TABLET ORAL 3 TIMES DAILY PRN
Qty: 30 TABLET | Refills: 0 | Status: SHIPPED | OUTPATIENT
Start: 2023-06-10

## 2023-06-10 RX ORDER — ASPIRIN 81 MG/1
81 TABLET ORAL DAILY
Qty: 30 TABLET | Refills: 3 | Status: SHIPPED | OUTPATIENT
Start: 2023-06-11

## 2023-06-10 RX ADMIN — Medication 10 ML: at 08:27

## 2023-06-10 RX ADMIN — PANTOPRAZOLE SODIUM 40 MG: 40 TABLET, DELAYED RELEASE ORAL at 06:19

## 2023-06-10 RX ADMIN — ASPIRIN 81 MG: 81 TABLET, COATED ORAL at 08:27

## 2023-06-10 NOTE — PLAN OF CARE
Patient in bed and educated regarding her discharge disposition. This nurse informed patient, she would remain admitted at this time, as she still needs to see the neurologist in the morning. This nurse informed patient, she would be discharged ASAP, once the neurologist sees her. Patient verbalized understanding. Denies discomfort. Will continue to monitor.      Problem: Adult Inpatient Plan of Care  Goal: Plan of Care Review  Outcome: Ongoing, Progressing  Flowsheets (Taken 6/10/2023 0110)  Progress: improving  Plan of Care Reviewed With: patient  Goal: Patient-Specific Goal (Individualized)  Outcome: Ongoing, Progressing  Goal: Absence of Hospital-Acquired Illness or Injury  Outcome: Ongoing, Progressing  Intervention: Identify and Manage Fall Risk  Recent Flowsheet Documentation  Taken 6/10/2023 0000 by Chuyita Henao RN  Safety Promotion/Fall Prevention:   activity supervised   assistive device/personal items within reach   clutter free environment maintained   fall prevention program maintained   lighting adjusted   nonskid shoes/slippers when out of bed   room organization consistent   safety round/check completed  Taken 6/9/2023 2200 by Chuyita Henao, RN  Safety Promotion/Fall Prevention:   activity supervised   assistive device/personal items within reach   clutter free environment maintained   fall prevention program maintained   lighting adjusted   nonskid shoes/slippers when out of bed   room organization consistent   safety round/check completed  Taken 6/9/2023 2130 by Chuyita Henao, RN  Safety Promotion/Fall Prevention:   activity supervised   assistive device/personal items within reach   clutter free environment maintained   fall prevention program maintained   lighting adjusted   nonskid shoes/slippers when out of bed   room organization consistent   safety round/check completed  Intervention: Prevent Skin Injury  Recent Flowsheet Documentation  Taken 6/10/2023 0000 by Chuyita Henao, [place]  Body  Position: position changed independently  Taken 6/9/2023 2200 by Chuyita Henao RN  Body Position: position changed independently  Taken 6/9/2023 2130 by Chuyita Henao RN  Body Position: position changed independently  Intervention: Prevent Infection  Recent Flowsheet Documentation  Taken 6/10/2023 0000 by Chuyita Henao RN  Infection Prevention:   hand hygiene promoted   rest/sleep promoted  Taken 6/9/2023 2200 by Chuyita Henao RN  Infection Prevention:   hand hygiene promoted   rest/sleep promoted  Taken 6/9/2023 2130 by Chuyita Henao RN  Infection Prevention:   hand hygiene promoted   rest/sleep promoted  Goal: Optimal Comfort and Wellbeing  Outcome: Ongoing, Progressing  Intervention: Provide Person-Centered Care  Recent Flowsheet Documentation  Taken 6/9/2023 2130 by Chuyita Henao RN  Trust Relationship/Rapport:   care explained   questions answered  Goal: Readiness for Transition of Care  Outcome: Ongoing, Progressing     Problem: Fall Injury Risk  Goal: Absence of Fall and Fall-Related Injury  Outcome: Ongoing, Progressing  Intervention: Identify and Manage Contributors  Recent Flowsheet Documentation  Taken 6/10/2023 0000 by Chuyita Henao RN  Medication Review/Management: medications reviewed  Taken 6/9/2023 2200 by Chuyita Henao RN  Medication Review/Management: medications reviewed  Taken 6/9/2023 2130 by Chuyita Henao RN  Medication Review/Management: medications reviewed  Intervention: Promote Injury-Free Environment  Recent Flowsheet Documentation  Taken 6/10/2023 0000 by Chuyita Henao RN  Safety Promotion/Fall Prevention:   activity supervised   assistive device/personal items within reach   clutter free environment maintained   fall prevention program maintained   lighting adjusted   nonskid shoes/slippers when out of bed   room organization consistent   safety round/check completed  Taken 6/9/2023 2200 by Chuyita Henao RN  Safety Promotion/Fall Prevention:   activity supervised   assistive  device/personal items within reach   clutter free environment maintained   fall prevention program maintained   lighting adjusted   nonskid shoes/slippers when out of bed   room organization consistent   safety round/check completed  Taken 6/9/2023 2130 by Chuyita Henao RN  Safety Promotion/Fall Prevention:   activity supervised   assistive device/personal items within reach   clutter free environment maintained   fall prevention program maintained   lighting adjusted   nonskid shoes/slippers when out of bed   room organization consistent   safety round/check completed   Goal Outcome Evaluation:  Plan of Care Reviewed With: patient        Progress: improving

## 2023-06-10 NOTE — NURSING NOTE
Okay to discharge patient after UA and TSH collected, pt. Does not need to stay for results per Neuro MD.

## 2023-06-10 NOTE — DISCHARGE SUMMARY
Patient Name: Radha Rivera  : 1955  MRN: [id number]    Date of Admission: 2023  Date of Discharge:  6/10/2023  Primary Care Physician: Paolo Dc MD      Discharge Diagnoses     Active Hospital Problems    Diagnosis  POA    Elevated blood pressure reading [R03.0]  Yes    GERD (gastroesophageal reflux disease) [K21.9]  Yes    Mild persistent asthma without complication [J45.30]  Yes      Resolved Hospital Problems    Diagnosis Date Resolved POA    Dizziness [R42] 06/10/2023 Yes        Hospital Course     Brief admission history and physical.  Please refer to the H&P for full details.  A pleasant 68 years old white female with a past history of bronchial asthma/cervical cancer/GERD who presents to the emergency department with rotational dizziness and feeling of dissociation/bilateral arm weakness/mental fogginess/slow speech.  Physical examination on admission included a blood pressure 161/84 a pulse of 87 temperature 98 respiratory rate of 17.  Rest of the examination is unremarkable.  Hospital course.  Initial ER evaluation included a CBC that was normal.  High-sensitivity troponin was normal.  PTT was normal.  CMP was normal except a random blood sugar of 103.  INR was normal.  TSH is normal high.  At 4.2.  Lipid profile is normal.  CT scan of the brain without contrast revealed no acute abnormalities.  CTA of the head and neck revealed small vessel disease but no significant narrowing.  The patient was admitted with vertigo to rule out a CVA.  Her symptomatology completely resolved.  CNS examination remained stable.  MRI of the brain revealed no acute abnormalities.  Neurology saw the patient and recommended outpatient follow-up for sleep study and vestibular rehab.  Patient was given meclizine as needed and aspirin at the time of discharge.  Again her CNS examination was normal at the time of discharge with resolution of her symptoms.  She did not have any cardiac symptoms during her  hospital stay.  She was counseled against driving until she sees her primary care physician.  She has declined any institution of blood pressure medication until she monitors that as an outpatient discuss it with her primary care physician.  Her GERD remained stable on proton pump inhibitor with benign GI examination.  Her asthma remains stable on Singulair and as needed albuterol.  At the time of discharge she was hemodynamically stable.    Consultants     Consult Orders (all) (From admission, onward)       Start     Ordered    06/09/23 1536  Inpatient Neurology Consult Stroke  Once        Specialty:  Neurology  Provider:  Osman Simon MD    06/09/23 1536    06/09/23 1523  Inpatient Neurology Consult Stroke  Once,   Status:  Canceled        Specialty:  Neurology  Provider:  (Not yet assigned)    06/09/23 1523    06/09/23 1141  Inpatient Neurology Consult Stroke  Once        Specialty:  Neurology  Provider:  (Not yet assigned)    06/09/23 1140    06/09/23 1141  Inpatient Neurology Consult Stroke  Once        Specialty:  Neurology  Provider:  (Not yet assigned)    06/09/23 1140                  Procedures     Imaging Results (All)       Procedure Component Value Units Date/Time    MRI Brain Without Contrast [[id number]] Collected: 06/09/23 1747     Updated: 06/09/23 1747    Narrative:      MRI BRAIN WO CONTRAST-  06/09/2023     HISTORY: Dizziness.     TECHNIQUE: Multiple noncontrast sagittal and axial images were obtained  through the brain.     FINDINGS: The diffusion-weighted images show no evidence of acute  infarction. FLAIR images show a few scattered foci of bright signal  intensity in the bilateral cerebral white matter consistent with mild  small vessel white matter ischemic disease. Normal-appearing vascular  flow voids are seen. The craniocervical junction and sella appear  normal. No intracranial hemorrhage is seen.       Impression:      1. No evidence of acute infarction or hemorrhage.  2. Mild  changes of bilateral small vessel white matter ischemic disease.          CT Angiogram Head w AI Analysis of LVO [[id number]] Collected: 06/09/23 1336     Updated: 06/09/23 1537    Narrative:      CT ANGIOGRAM OF THE HEAD AND NECK AND CT PERFUSION STUDY     CLINICAL HISTORY:  Dizziness for several weeks. This morning, the  patient experienced bilateral arm weakness and difficulty with language.     TECHNIQUE: A noncontrast head CT was performed with 3 mm axial images.  Thereafter, a CT perfusion study was performed after the dynamic bolus  of IV contrast. Standard perfusion maps were constructed with RAPID  software. A CT angiogram of the head and neck was then performed with 1  mm axial images. Sagittal, coronal, and 3-dimensional reconstructed  images were obtained. Finally, a delayed postcontrast head CT was  performed with 3 mm axial images.     FINDINGS:     NONCONTRAST HEAD CT:  The ventricles, sulci, and cisterns are  age-appropriate. The gray-white matter differentiation is within normal  limits. The basal ganglia and thalami are unremarkable. The posterior  fossa structures are within normal limits.     CT PERFUSION STUDY:  No significant asymmetries are evident on the CBF  less than 30% or the time to maximum enhancement greater than 6 second  maps.     CTA NECK:  There is a bovine configuration of the aortic arch. There is  no significant great vessel origin stenosis. There is a mildly beaded  appearance of the V3 segments of the vertebral arteries suggesting  fibromuscular dysplasia. Otherwise, the vertebral arteries are  unremarkable. There is no significant NASCET stenosis identified within  either internal carotid artery.     CTA HEAD: The vertebral arteries, basilar artery, and posterior cerebral  arteries are within normal limits. The internal carotids, middle  cerebrals, and anterior cerebrals are unremarkable.     DELAYED POSTCONTRAST HEAD CT:  No abnormal foci of contrast enhancement  are  appreciated within the brain parenchyma.       Impression:         There is no CT evidence to suggest an acute completed infarct on the  noncontrast head CT or CT perfusion study. No perfusion abnormality is  identified on the CT perfusion exam. There is no evidence for large  vessel occlusion. However, this study does not exclude an infarct and  further evaluation is recommended with an MRI of the brain.     There is a mildly beaded appearance of the V3 segments of the vertebral  arteries suggesting fibromuscular dysplasia.     Otherwise, the CT angiogram of the head and neck is unremarkable.     These findings and recommendations were discussed with Elle Kumar  on 06/09/2023 at approximately 12:55 PM.     This report was finalized on 6/9/2023 3:34 PM by Dr. Timmy Jacques M.D.       CT Angiogram Neck [[id number]] Collected: 06/09/23 1336     Updated: 06/09/23 1537    Narrative:      CT ANGIOGRAM OF THE HEAD AND NECK AND CT PERFUSION STUDY     CLINICAL HISTORY:  Dizziness for several weeks. This morning, the  patient experienced bilateral arm weakness and difficulty with language.     TECHNIQUE: A noncontrast head CT was performed with 3 mm axial images.  Thereafter, a CT perfusion study was performed after the dynamic bolus  of IV contrast. Standard perfusion maps were constructed with RAPID  software. A CT angiogram of the head and neck was then performed with 1  mm axial images. Sagittal, coronal, and 3-dimensional reconstructed  images were obtained. Finally, a delayed postcontrast head CT was  performed with 3 mm axial images.     FINDINGS:     NONCONTRAST HEAD CT:  The ventricles, sulci, and cisterns are  age-appropriate. The gray-white matter differentiation is within normal  limits. The basal ganglia and thalami are unremarkable. The posterior  fossa structures are within normal limits.     CT PERFUSION STUDY:  No significant asymmetries are evident on the CBF  less than 30% or the time to maximum  enhancement greater than 6 second  maps.     CTA NECK:  There is a bovine configuration of the aortic arch. There is  no significant great vessel origin stenosis. There is a mildly beaded  appearance of the V3 segments of the vertebral arteries suggesting  fibromuscular dysplasia. Otherwise, the vertebral arteries are  unremarkable. There is no significant NASCET stenosis identified within  either internal carotid artery.     CTA HEAD: The vertebral arteries, basilar artery, and posterior cerebral  arteries are within normal limits. The internal carotids, middle  cerebrals, and anterior cerebrals are unremarkable.     DELAYED POSTCONTRAST HEAD CT:  No abnormal foci of contrast enhancement  are appreciated within the brain parenchyma.       Impression:         There is no CT evidence to suggest an acute completed infarct on the  noncontrast head CT or CT perfusion study. No perfusion abnormality is  identified on the CT perfusion exam. There is no evidence for large  vessel occlusion. However, this study does not exclude an infarct and  further evaluation is recommended with an MRI of the brain.     There is a mildly beaded appearance of the V3 segments of the vertebral  arteries suggesting fibromuscular dysplasia.     Otherwise, the CT angiogram of the head and neck is unremarkable.     These findings and recommendations were discussed with Elle Kumar  on 06/09/2023 at approximately 12:55 PM.     This report was finalized on 6/9/2023 3:34 PM by Dr. Timmy Jacques M.D.       CT CEREBRAL PERFUSION WITH & WITHOUT CONTRAST [[id number]] Collected: 06/09/23 1336     Updated: 06/09/23 1537    Narrative:      CT ANGIOGRAM OF THE HEAD AND NECK AND CT PERFUSION STUDY     CLINICAL HISTORY:  Dizziness for several weeks. This morning, the  patient experienced bilateral arm weakness and difficulty with language.     TECHNIQUE: A noncontrast head CT was performed with 3 mm axial images.  Thereafter, a CT perfusion study was  performed after the dynamic bolus  of IV contrast. Standard perfusion maps were constructed with RAPID  software. A CT angiogram of the head and neck was then performed with 1  mm axial images. Sagittal, coronal, and 3-dimensional reconstructed  images were obtained. Finally, a delayed postcontrast head CT was  performed with 3 mm axial images.     FINDINGS:     NONCONTRAST HEAD CT:  The ventricles, sulci, and cisterns are  age-appropriate. The gray-white matter differentiation is within normal  limits. The basal ganglia and thalami are unremarkable. The posterior  fossa structures are within normal limits.     CT PERFUSION STUDY:  No significant asymmetries are evident on the CBF  less than 30% or the time to maximum enhancement greater than 6 second  maps.     CTA NECK:  There is a bovine configuration of the aortic arch. There is  no significant great vessel origin stenosis. There is a mildly beaded  appearance of the V3 segments of the vertebral arteries suggesting  fibromuscular dysplasia. Otherwise, the vertebral arteries are  unremarkable. There is no significant NASCET stenosis identified within  either internal carotid artery.     CTA HEAD: The vertebral arteries, basilar artery, and posterior cerebral  arteries are within normal limits. The internal carotids, middle  cerebrals, and anterior cerebrals are unremarkable.     DELAYED POSTCONTRAST HEAD CT:  No abnormal foci of contrast enhancement  are appreciated within the brain parenchyma.       Impression:         There is no CT evidence to suggest an acute completed infarct on the  noncontrast head CT or CT perfusion study. No perfusion abnormality is  identified on the CT perfusion exam. There is no evidence for large  vessel occlusion. However, this study does not exclude an infarct and  further evaluation is recommended with an MRI of the brain.     There is a mildly beaded appearance of the V3 segments of the vertebral  arteries suggesting  fibromuscular dysplasia.     Otherwise, the CT angiogram of the head and neck is unremarkable.     These findings and recommendations were discussed with Elle Kumar  on 06/09/2023 at approximately 12:55 PM.     This report was finalized on 6/9/2023 3:34 PM by Dr. Timmy Jacques M.D.       XR Chest 1 View [[id number]] Collected: 06/09/23 1301     Updated: 06/09/23 1304    Narrative:      XR CHEST 1 VW-     HISTORY: Female who is 68 years-old,  stroke     TECHNIQUE: Frontal view of the chest     COMPARISON: None available     FINDINGS: The heart size is normal. Aorta is tortuous. Pulmonary  vasculature is unremarkable. No focal pulmonary consolidation, pleural  effusion, or pneumothorax. No acute osseous process.       Impression:      No evidence for acute pulmonary process. Tortuous aorta.  Follow-up as clinically indicated.     This report was finalized on 6/9/2023 1:01 PM by Dr. Chucho Graham M.D.               Pertinent Labs     Results from last 7 days   Lab Units 06/10/23  0752 06/09/23  1140   WBC 10*3/mm3 4.92 5.73   HEMOGLOBIN g/dL 13.0 13.8   PLATELETS 10*3/mm3 191 214     Results from last 7 days   Lab Units 06/10/23  0752 06/09/23  1207 06/09/23  1140   SODIUM mmol/L 142  --  139   POTASSIUM mmol/L 4.4  --  4.1   CHLORIDE mmol/L 109*  --  104   CO2 mmol/L 28.1  --  25.0   BUN mg/dL 9  --  20   CREATININE mg/dL 0.48* 0.48 0.61   GLUCOSE mg/dL 99  --  103*   Estimated Creatinine Clearance: 118.4 mL/min (A) (by C-G formula based on SCr of 0.48 mg/dL (L)).  Results from last 7 days   Lab Units 06/10/23  0752 06/09/23  1140   ALBUMIN g/dL 3.8 4.4   BILIRUBIN mg/dL 0.4 0.4   ALK PHOS U/L 72 93   AST (SGOT) U/L 19 21   ALT (SGPT) U/L 21 20     Results from last 7 days   Lab Units 06/10/23  0752 06/09/23  1140   CALCIUM mg/dL 8.9 9.4   ALBUMIN g/dL 3.8 4.4       Results from last 7 days   Lab Units 06/09/23  1140   HSTROP T ng/L 6       Results from last 7 days   Lab Units 06/10/23  0752   CHOLESTEROL  mg/dL 187   TRIGLYCERIDES mg/dL 115   HDL CHOL mg/dL 49   LDL CHOL mg/dL 117*         Imaging Results (Last 24 Hours)       Procedure Component Value Units Date/Time    MRI Brain Without Contrast [[id number]] Collected: 06/09/23 1747     Updated: 06/09/23 1747    Narrative:      MRI BRAIN WO CONTRAST-  06/09/2023     HISTORY: Dizziness.     TECHNIQUE: Multiple noncontrast sagittal and axial images were obtained  through the brain.     FINDINGS: The diffusion-weighted images show no evidence of acute  infarction. FLAIR images show a few scattered foci of bright signal  intensity in the bilateral cerebral white matter consistent with mild  small vessel white matter ischemic disease. Normal-appearing vascular  flow voids are seen. The craniocervical junction and sella appear  normal. No intracranial hemorrhage is seen.       Impression:      1. No evidence of acute infarction or hemorrhage.  2. Mild changes of bilateral small vessel white matter ischemic disease.          CT Angiogram Head w AI Analysis of LVO [[id number]] Collected: 06/09/23 1336     Updated: 06/09/23 1537    Narrative:      CT ANGIOGRAM OF THE HEAD AND NECK AND CT PERFUSION STUDY     CLINICAL HISTORY:  Dizziness for several weeks. This morning, the  patient experienced bilateral arm weakness and difficulty with language.     TECHNIQUE: A noncontrast head CT was performed with 3 mm axial images.  Thereafter, a CT perfusion study was performed after the dynamic bolus  of IV contrast. Standard perfusion maps were constructed with RAPID  software. A CT angiogram of the head and neck was then performed with 1  mm axial images. Sagittal, coronal, and 3-dimensional reconstructed  images were obtained. Finally, a delayed postcontrast head CT was  performed with 3 mm axial images.     FINDINGS:     NONCONTRAST HEAD CT:  The ventricles, sulci, and cisterns are  age-appropriate. The gray-white matter differentiation is within normal  limits. The basal ganglia and  thalami are unremarkable. The posterior  fossa structures are within normal limits.     CT PERFUSION STUDY:  No significant asymmetries are evident on the CBF  less than 30% or the time to maximum enhancement greater than 6 second  maps.     CTA NECK:  There is a bovine configuration of the aortic arch. There is  no significant great vessel origin stenosis. There is a mildly beaded  appearance of the V3 segments of the vertebral arteries suggesting  fibromuscular dysplasia. Otherwise, the vertebral arteries are  unremarkable. There is no significant NASCET stenosis identified within  either internal carotid artery.     CTA HEAD: The vertebral arteries, basilar artery, and posterior cerebral  arteries are within normal limits. The internal carotids, middle  cerebrals, and anterior cerebrals are unremarkable.     DELAYED POSTCONTRAST HEAD CT:  No abnormal foci of contrast enhancement  are appreciated within the brain parenchyma.       Impression:         There is no CT evidence to suggest an acute completed infarct on the  noncontrast head CT or CT perfusion study. No perfusion abnormality is  identified on the CT perfusion exam. There is no evidence for large  vessel occlusion. However, this study does not exclude an infarct and  further evaluation is recommended with an MRI of the brain.     There is a mildly beaded appearance of the V3 segments of the vertebral  arteries suggesting fibromuscular dysplasia.     Otherwise, the CT angiogram of the head and neck is unremarkable.     These findings and recommendations were discussed with Elle Kumar  on 06/09/2023 at approximately 12:55 PM.     This report was finalized on 6/9/2023 3:34 PM by Dr. Timmy Jacques M.D.       CT Angiogram Neck [[id number]] Collected: 06/09/23 1336     Updated: 06/09/23 1537    Narrative:      CT ANGIOGRAM OF THE HEAD AND NECK AND CT PERFUSION STUDY     CLINICAL HISTORY:  Dizziness for several weeks. This morning, the  patient  experienced bilateral arm weakness and difficulty with language.     TECHNIQUE: A noncontrast head CT was performed with 3 mm axial images.  Thereafter, a CT perfusion study was performed after the dynamic bolus  of IV contrast. Standard perfusion maps were constructed with RAPID  software. A CT angiogram of the head and neck was then performed with 1  mm axial images. Sagittal, coronal, and 3-dimensional reconstructed  images were obtained. Finally, a delayed postcontrast head CT was  performed with 3 mm axial images.     FINDINGS:     NONCONTRAST HEAD CT:  The ventricles, sulci, and cisterns are  age-appropriate. The gray-white matter differentiation is within normal  limits. The basal ganglia and thalami are unremarkable. The posterior  fossa structures are within normal limits.     CT PERFUSION STUDY:  No significant asymmetries are evident on the CBF  less than 30% or the time to maximum enhancement greater than 6 second  maps.     CTA NECK:  There is a bovine configuration of the aortic arch. There is  no significant great vessel origin stenosis. There is a mildly beaded  appearance of the V3 segments of the vertebral arteries suggesting  fibromuscular dysplasia. Otherwise, the vertebral arteries are  unremarkable. There is no significant NASCET stenosis identified within  either internal carotid artery.     CTA HEAD: The vertebral arteries, basilar artery, and posterior cerebral  arteries are within normal limits. The internal carotids, middle  cerebrals, and anterior cerebrals are unremarkable.     DELAYED POSTCONTRAST HEAD CT:  No abnormal foci of contrast enhancement  are appreciated within the brain parenchyma.       Impression:         There is no CT evidence to suggest an acute completed infarct on the  noncontrast head CT or CT perfusion study. No perfusion abnormality is  identified on the CT perfusion exam. There is no evidence for large  vessel occlusion. However, this study does not exclude an  infarct and  further evaluation is recommended with an MRI of the brain.     There is a mildly beaded appearance of the V3 segments of the vertebral  arteries suggesting fibromuscular dysplasia.     Otherwise, the CT angiogram of the head and neck is unremarkable.     These findings and recommendations were discussed with Elle Kumar  on 06/09/2023 at approximately 12:55 PM.     This report was finalized on 6/9/2023 3:34 PM by Dr. Timmy Jacques M.D.       CT CEREBRAL PERFUSION WITH & WITHOUT CONTRAST [[id number]] Collected: 06/09/23 1336     Updated: 06/09/23 1537    Narrative:      CT ANGIOGRAM OF THE HEAD AND NECK AND CT PERFUSION STUDY     CLINICAL HISTORY:  Dizziness for several weeks. This morning, the  patient experienced bilateral arm weakness and difficulty with language.     TECHNIQUE: A noncontrast head CT was performed with 3 mm axial images.  Thereafter, a CT perfusion study was performed after the dynamic bolus  of IV contrast. Standard perfusion maps were constructed with RAPID  software. A CT angiogram of the head and neck was then performed with 1  mm axial images. Sagittal, coronal, and 3-dimensional reconstructed  images were obtained. Finally, a delayed postcontrast head CT was  performed with 3 mm axial images.     FINDINGS:     NONCONTRAST HEAD CT:  The ventricles, sulci, and cisterns are  age-appropriate. The gray-white matter differentiation is within normal  limits. The basal ganglia and thalami are unremarkable. The posterior  fossa structures are within normal limits.     CT PERFUSION STUDY:  No significant asymmetries are evident on the CBF  less than 30% or the time to maximum enhancement greater than 6 second  maps.     CTA NECK:  There is a bovine configuration of the aortic arch. There is  no significant great vessel origin stenosis. There is a mildly beaded  appearance of the V3 segments of the vertebral arteries suggesting  fibromuscular dysplasia. Otherwise, the vertebral  arteries are  unremarkable. There is no significant NASCET stenosis identified within  either internal carotid artery.     CTA HEAD: The vertebral arteries, basilar artery, and posterior cerebral  arteries are within normal limits. The internal carotids, middle  cerebrals, and anterior cerebrals are unremarkable.     DELAYED POSTCONTRAST HEAD CT:  No abnormal foci of contrast enhancement  are appreciated within the brain parenchyma.       Impression:         There is no CT evidence to suggest an acute completed infarct on the  noncontrast head CT or CT perfusion study. No perfusion abnormality is  identified on the CT perfusion exam. There is no evidence for large  vessel occlusion. However, this study does not exclude an infarct and  further evaluation is recommended with an MRI of the brain.     There is a mildly beaded appearance of the V3 segments of the vertebral  arteries suggesting fibromuscular dysplasia.     Otherwise, the CT angiogram of the head and neck is unremarkable.     These findings and recommendations were discussed with Elle Kumar  on 06/09/2023 at approximately 12:55 PM.     This report was finalized on 6/9/2023 3:34 PM by Dr. Timmy Jacques M.D.       XR Chest 1 View [[id number]] Collected: 06/09/23 1301     Updated: 06/09/23 1304    Narrative:      XR CHEST 1 VW-     HISTORY: Female who is 68 years-old,  stroke     TECHNIQUE: Frontal view of the chest     COMPARISON: None available     FINDINGS: The heart size is normal. Aorta is tortuous. Pulmonary  vasculature is unremarkable. No focal pulmonary consolidation, pleural  effusion, or pneumothorax. No acute osseous process.       Impression:      No evidence for acute pulmonary process. Tortuous aorta.  Follow-up as clinically indicated.     This report was finalized on 6/9/2023 1:01 PM by Dr. Chucho Graham M.D.               Test Results Pending at Discharge         Discharge Exam   Physical Exam  Vitals.  Temperature 98.3 a pulse  of 89 respirate rate of 18 and blood pressure 157/88 and O2 sats of 95% on room air  General.  Middle-aged female.  Alert and oriented x3.  No apparent pain/distress/diaphoresis.  Normal mood and affect.  Eyes.  Pupils equal round and reactive.  Intact extraocular musculature.  No pallor or jaundice.  Oral cavity.  Moist mucous membrane.  Neck.  Supple.  No JVD.  No lymphadenopathy or thyromegaly.  No carotid bruit.  Cardiovascular.  Regular rate and rhythm with no gallops or murmurs.  Chest.  Clear to auscultation bilaterally with no added sounds.  Abdomen.  Soft lax.  No tenderness.  No organomegaly.  No guarding or rebound.  Extremities.  No clubbing/cyanosis/edema.  CNS.  No acute focal neurological deficits with intact motor/sensory/cranial nerve/cerebellar systems.  Gait is normal.    Discharge Details        Discharge Medications        New Medications        Instructions Start Date   aspirin 81 MG EC tablet   81 mg, Oral, Daily   Start Date: June 11, 2023     meclizine 25 MG tablet  Commonly known as: ANTIVERT   25 mg, Oral, 3 Times Daily PRN             Continue These Medications        Instructions Start Date   Calcium-Phosphorus-Vitamin D 250-107-500 MG-MG-UNIT chewable tablet   Oral      cholecalciferol 25 MCG (1000 UT) tablet  Commonly known as: VITAMIN D3   Oral      dicyclomine 10 MG capsule  Commonly known as: BENTYL   10 mg, Oral, 3 Times Daily PRN      levocetirizine 5 MG tablet  Commonly known as: XYZAL   5 mg, Oral, Every Evening      montelukast 10 MG tablet  Commonly known as: SINGULAIR   TK 1 T PO HS      omeprazole 40 MG capsule  Commonly known as: priLOSEC   40 mg, Oral, Daily      ondansetron ODT 4 MG disintegrating tablet  Commonly known as: ZOFRAN-ODT   4 mg, Translingual, Every 6 Hours PRN      ProAir  (90 Base) MCG/ACT inhaler  Generic drug: albuterol sulfate HFA   INL 2 PFS PO Q 4 TO 6 H PRN               Allergies   Allergen Reactions    Zostavax [Zoster Vaccine Live] Itching          Discharge Disposition:  Condition: Stable.    Diet:   Diet Order   Procedures    Diet: Regular/House Diet; Texture: Regular Texture (IDDSI 7); Fluid Consistency: Thin (IDDSI 0)       Activity:   Activity Instructions       Activity as Tolerated      Driving Restrictions      Type of Restriction: Driving    Driving Restrictions: No Driving Until Next Appointment              CODE STATUS:    Code Status and Medical Interventions:   Ordered at: 06/09/23 8519     Code Status (Patient has no pulse and is not breathing):    CPR (Attempt to Resuscitate)     Medical Interventions (Patient has pulse or is breathing):    Full Support       Future Appointments   Date Time Provider Department Center   8/30/2023 10:45 AM Mitchell Pablo MD [facility]   12/4/2023  8:50 AM [facility]   12/7/2023  9:00 AM Paolo Dc MD [facility]     Additional Instructions for the Follow-ups that You Need to Schedule       Ambulatory Referral to Physical Therapy Evaluate and treat (Vestibular rehab)   As directed      Specialty needed: Evaluate and treat (Vestibular rehab)    Follow-up needed: Yes         Call MD With Problems / Concerns   As directed      Instructions: Call MD or return to ER if headache/recurrence of the dizziness/focal neurological symptoms/chest pain/palpitation/shortness of breath/seizures/loss of consciousness    Order Comments: Instructions: Call MD or return to ER if headache/recurrence of the dizziness/focal neurological symptoms/chest pain/palpitation/shortness of breath/seizures/loss of consciousness          Discharge Follow-up with PCP   As directed       Currently Documented PCP:    Paolo Dc MD    PCP Phone Number:    [phone]     Follow Up Details: Primary MD.  5 days.  Vertigo/asthma/elevated blood pressure/GERD                Follow-up Information       Paolo Dc MD .    Specialty: Internal Medicine  Why: Primary MD.  5 days.   Vertigo/asthma/elevated blood pressure/GERD  Contact information:  [address] [address]  [phone]                               Time Spent on Discharge:  Greater than 30 minutes      Jonny Sanchez MD  [facility]  06/10/23  12:16 EDT

## 2023-06-10 NOTE — CONSULTS
"Neurology Consult Note  Consult Date: 6/10/2023  Referring MD: Paolo Dc MD  Reason for Consult I have been asked to see the patient in neurological consultation to render advice and opinion regarding several complaints    Radha Rivera is a 68 y.o. female who medical history of cervical cancer back in 1977, presenting for several complaints that failed to localize to any 1 vascular distribution.  She reports her major complaint is waking up with some \"swimmy\" head in the morning.  When asked to explain swimmy head, patient reports that she just feels kind woozy as if the world is moving.  She states that this resolves after seconds to minutes and then she is able to go about her day.  She reports that this does not happen every day.  However on the day of admission she did have this episode and throughout the day she felt a little slower than normal.  She also reports having bilateral arm weakness as well as feeling foggy about the head.  She decided to present for the symptoms and on evaluation she was found to have elevated blood pressures to the 180s systolic.  Noncon head CT negative for acute process.  CTA of the head and neck negative for any flow-limiting stenosis or large vessel occlusion.  MRI of the brain negative for acute stroke.    On interview today, patient reports that she has been with increased stress as she is doing home renovations with her .  She denies any ongoing headaches, any recent infection, any recent illnesses.  She does mention having a GI virus back about 6 to 8 weeks ago but that has completely resolved.    Past Medical/Surgical Hx:  Past Medical History:   Diagnosis Date    Asthma     Cancer     Carcinoma in situ of cervix     IN 1977    Liver hemangioma     s/p imaging eval in past    Overweight (BMI 25.0-29.9) 10/29/2019    Pap smear abnormality of cervix with ASCUS favoring benign     2007    PMB (postmenopausal bleeding)      Past Surgical History:   Procedure " Laterality Date    CATARACT EXTRACTION, BILATERAL Bilateral 2019     SECTION      CHOLECYSTECTOMY  2016    COLONOSCOPY N/A 2016    Procedure: COLONOSCOPY TO CECUM;  Surgeon: Aisha Partida MD;  Location: [facility] ENDOSCOPY;  Service:     DILATATION AND CURETTAGE      DILATION AND CURETTAGE, DIAGNOSTIC / THERAPEUTIC      endometrial hyperplasia    TONSILLECTOMY       Medications On Admission  Medications Prior to Admission   Medication Sig Dispense Refill Last Dose    omeprazole (priLOSEC) 40 MG capsule Take 1 capsule by mouth Daily. 90 capsule 2 Past Week    Calcium-Phosphorus-Vitamin D 250-107-500 MG-MG-UNIT chewable tablet Chew.       Cholecalciferol (VITAMIN D) 1000 UNITS tablet Take  by mouth.       dicyclomine (BENTYL) 10 MG capsule Take 1 capsule by mouth 3 (Three) Times a Day As Needed (Abdominal spasms). 30 capsule 0     levocetirizine (XYZAL) 5 MG tablet Take 1 tablet by mouth Every Evening.       montelukast (SINGULAIR) 10 MG tablet TK 1 T PO HS  11     ondansetron ODT (ZOFRAN-ODT) 4 MG disintegrating tablet Place 1 tablet on the tongue Every 6 (Six) Hours As Needed for Nausea or Vomiting. 30 tablet 0     PROAIR  (90 Base) MCG/ACT inhaler INL 2 PFS PO Q 4 TO 6 H PRN  11      Allergies:  Allergies   Allergen Reactions    Zostavax [Zoster Vaccine Live] Itching     Social Hx:  Social History     Socioeconomic History    Marital status:     Number of children: 2   Tobacco Use    Smoking status: Never     Passive exposure: Never    Smokeless tobacco: Never   Vaping Use    Vaping Use: Never used   Substance and Sexual Activity    Alcohol use: Yes     Comment: 2-4 glasses wine/ week    Drug use: No    Sexual activity: Yes     Partners: Male     Comment:  only; HPV in past     Family Hx:  Family History   Problem Relation Age of Onset    Hypertension Mother     Thyroid disease Mother     Osteoporosis Mother     Alcohol abuse Father     Cancer Maternal Grandmother          "breast cancer    Breast cancer Maternal Grandmother     Cancer Cousin         breast cancer - two cousins    Breast cancer Cousin     No Known Problems Son     No Known Problems Daughter      Review of Systems  Exam  /88 (BP Location: Right arm, Patient Position: Lying)   Pulse 89   Temp 98.3 °F (36.8 °C) (Oral)   Resp 18   Ht 170.2 cm (67\")   Wt 77.1 kg (170 lb)   SpO2 95%   BMI 26.63 kg/m²     Current Facility-Administered Medications:     acetaminophen (TYLENOL) tablet 650 mg, 650 mg, Oral, Q4H PRN **OR** acetaminophen (TYLENOL) 160 MG/5ML solution 650 mg, 650 mg, Oral, Q4H PRN **OR** acetaminophen (TYLENOL) suppository 650 mg, 650 mg, Rectal, Q4H PRN, Lavon Flores MD    aspirin EC tablet 81 mg, 81 mg, Oral, Daily, Lavon Flores MD, 81 mg at 06/10/23 0827    sennosides-docusate (PERICOLACE) 8.6-50 MG per tablet 2 tablet, 2 tablet, Oral, BID **AND** polyethylene glycol (MIRALAX) packet 17 g, 17 g, Oral, Daily PRN **AND** bisacodyl (DULCOLAX) EC tablet 5 mg, 5 mg, Oral, Daily PRN **AND** bisacodyl (DULCOLAX) suppository 10 mg, 10 mg, Rectal, Daily PRN, Lavon Flores MD    Calcium Replacement - Follow Nurse / BPA Driven Protocol, , Does not apply, PRNMark Jawed, MD    Magnesium Standard Dose Replacement - Follow Nurse / BPA Driven Protocol, , Does not apply, PRNMark Jawed, MD    nitroglycerin (NITROSTAT) SL tablet 0.4 mg, 0.4 mg, Sublingual, Q5 Min PRN, Lavon Flores MD    pantoprazole (PROTONIX) EC tablet 40 mg, 40 mg, Oral, Q AM, Lavon Flores MD, 40 mg at 06/10/23 0619    Phosphorus Replacement - Follow Nurse / BPA Driven Protocol, , Does not apply, Mark CUMMINGS Jawed, MD    Potassium Replacement - Follow Nurse / BPA Driven Protocol, , Does not apply, PRN, Lavon Flores MD    sodium chloride 0.9 % flush 10 mL, 10 mL, Intravenous, PRN, Lavon Flores MD    sodium chloride 0.9 % flush 10 mL, 10 mL, Intravenous, Q12H, Lavon Flores MD, 10 mL at 06/10/23 0827    sodium chloride 0.9 % flush 10 mL, " 10 mL, Intravenous, PRN, Lavon Flores MD    sodium chloride 0.9 % infusion 40 mL, 40 mL, Intravenous, PRN, Lavon Flores MD    sodium chloride 0.9 % with KCl 20 mEq/L infusion, 100 mL/hr, Intravenous, Continuous, Lavon Flores MD, Last Rate: 100 mL/hr at 06/10/23 1104, 100 mL/hr at 06/10/23 1104    PRN meds    acetaminophen **OR** acetaminophen **OR** acetaminophen    senna-docusate sodium **AND** polyethylene glycol **AND** bisacodyl **AND** bisacodyl    Calcium Replacement - Follow Nurse / BPA Driven Protocol    Magnesium Standard Dose Replacement - Follow Nurse / BPA Driven Protocol    nitroglycerin    Phosphorus Replacement - Follow Nurse / BPA Driven Protocol    Potassium Replacement - Follow Nurse / BPA Driven Protocol    sodium chloride    sodium chloride    sodium chloride    No current facility-administered medications on file prior to encounter.     Current Outpatient Medications on File Prior to Encounter   Medication Sig    omeprazole (priLOSEC) 40 MG capsule Take 1 capsule by mouth Daily.    Calcium-Phosphorus-Vitamin D 250-107-500 MG-MG-UNIT chewable tablet Chew.    Cholecalciferol (VITAMIN D) 1000 UNITS tablet Take  by mouth.    dicyclomine (BENTYL) 10 MG capsule Take 1 capsule by mouth 3 (Three) Times a Day As Needed (Abdominal spasms).    levocetirizine (XYZAL) 5 MG tablet Take 1 tablet by mouth Every Evening.    montelukast (SINGULAIR) 10 MG tablet TK 1 T PO HS    ondansetron ODT (ZOFRAN-ODT) 4 MG disintegrating tablet Place 1 tablet on the tongue Every 6 (Six) Hours As Needed for Nausea or Vomiting.    PROAIR  (90 Base) MCG/ACT inhaler INL 2 PFS PO Q 4 TO 6 H PRN       General appearance: NAD, alert and cooperative, well groomed  HEENT: Normocephalic, atraumatic  Eyes: Anicteric  COR: RRR on the monitor  Resp: Even and unlabored  Extremities: No obvious edema.  Skin: warm, dry    Neurological:   MS: oriented x3, recent/remote memory intact, normal attention/concentration, language intact,  no neglect, normal fund of knowledge  CN:  visual fields full, EOMI, facial sensation equal, no facial droop, hearing grossly intact to conversational voice, palate elevates symmetrically, shoulder shrug equal, tongue midline  Motor: 5/5 in all 4 ext., normal tone  Sensory: light touch sensation intact in all 4 ext.  Coordination: Normal finger to nose test  Rapid alternating movements: normal finger to thumb tap    Laboratory results:  Lab Results   Component Value Date    GLUCOSE 99 06/10/2023    CALCIUM 8.9 06/10/2023     06/10/2023    K 4.4 06/10/2023    CO2 28.1 06/10/2023     (H) 06/10/2023    BUN 9 06/10/2023    CREATININE 0.48 (L) 06/10/2023    EGFRIFAFRI 100 11/08/2021    EGFRIFNONA 87 11/08/2021    BCR 18.8 06/10/2023    ANIONGAP 4.9 (L) 06/10/2023     Lab Results   Component Value Date    WBC 4.92 06/10/2023    HGB 13.0 06/10/2023    HCT 38.3 06/10/2023    MCV 87.0 06/10/2023     06/10/2023     Lab Results   Component Value Date    CHOL 187 06/10/2023     Lab Results   Component Value Date    HDL 49 06/10/2023    HDL 41 05/18/2023    HDL 51 11/10/2022     Lab Results   Component Value Date     (H) 06/10/2023     (H) 05/18/2023     (H) 11/10/2022     Lab Results   Component Value Date    TRIG 115 06/10/2023    TRIG 142 11/10/2022    TRIG 95 11/08/2021     Lab Results   Component Value Date    HGBA1C 5.30 05/18/2023     Lab Results   Component Value Date    INR 1.10 06/09/2023    PROTIME 12.2 06/09/2023     Lab Results   Component Value Date    CMEEJLYC84 288 08/25/2022     Lab Results   Component Value Date    TSH 3.070 05/18/2023     Pain Management Panel           No data to display               Brief Urine Lab Results  (Last result in the past 365 days)        Color   Clarity   Blood   Leuk Est   Nitrite   Protein   CREAT   Urine HCG        11/10/22 0823 Yellow   Clear   Negative   Negative   Negative   Negative                   Lab review: I personally reviewed  all labs as documented above.    Imaging review:   CT Angiogram Neck    Result Date: 6/9/2023   There is no CT evidence to suggest an acute completed infarct on the noncontrast head CT or CT perfusion study. No perfusion abnormality is identified on the CT perfusion exam. There is no evidence for large vessel occlusion. However, this study does not exclude an infarct and further evaluation is recommended with an MRI of the brain.  There is a mildly beaded appearance of the V3 segments of the vertebral arteries suggesting fibromuscular dysplasia.  Otherwise, the CT angiogram of the head and neck is unremarkable.  These findings and recommendations were discussed with Elle Kumar on 06/09/2023 at approximately 12:55 PM.  This report was finalized on 6/9/2023 3:34 PM by Dr. Timmy Jacques M.D.      MRI Brain Without Contrast    Result Date: 6/9/2023  1. No evidence of acute infarction or hemorrhage. 2. Mild changes of bilateral small vessel white matter ischemic disease.       XR Chest 1 View    Result Date: 6/9/2023  No evidence for acute pulmonary process. Tortuous aorta. Follow-up as clinically indicated.  This report was finalized on 6/9/2023 1:01 PM by Dr. Chucho Graham M.D.      CT Angiogram Head w AI Analysis of LVO    Result Date: 6/9/2023   There is no CT evidence to suggest an acute completed infarct on the noncontrast head CT or CT perfusion study. No perfusion abnormality is identified on the CT perfusion exam. There is no evidence for large vessel occlusion. However, this study does not exclude an infarct and further evaluation is recommended with an MRI of the brain.  There is a mildly beaded appearance of the V3 segments of the vertebral arteries suggesting fibromuscular dysplasia.  Otherwise, the CT angiogram of the head and neck is unremarkable.  These findings and recommendations were discussed with Elle Kumar on 06/09/2023 at approximately 12:55 PM.  This report was finalized on  6/9/2023 3:34 PM by Dr. Timmy Jacques M.D.      CT CEREBRAL PERFUSION WITH & WITHOUT CONTRAST    Result Date: 6/9/2023   There is no CT evidence to suggest an acute completed infarct on the noncontrast head CT or CT perfusion study. No perfusion abnormality is identified on the CT perfusion exam. There is no evidence for large vessel occlusion. However, this study does not exclude an infarct and further evaluation is recommended with an MRI of the brain.  There is a mildly beaded appearance of the V3 segments of the vertebral arteries suggesting fibromuscular dysplasia.  Otherwise, the CT angiogram of the head and neck is unremarkable.  These findings and recommendations were discussed with Elle Kumar on 06/09/2023 at approximately 12:55 PM.  This report was finalized on 6/9/2023 3:34 PM by Dr. Timmy Jacques M.D.         I personally reviewed images and agree with radiology report.    Diagnosis:    PLAN:   Blood pressure goal normotension.  I recommended starting an antihypertensive but patient would like to defer for now.  I encouraged her to take her blood pressures once daily and meet with her PCP within the next 2 to 4 weeks to decide on antihypertensive  We will obtain urine and TSH studies  Stroke Education  TRI/SCDs  PT for vestibular rehab  Outpatient sleep evaluation  Therapies as written. CCP for discharge planning. Call RRT for any acute neurological changes. We will continue to follow and advise.    No further work-up from neurology perspective.  We will sign off.    Aida Simon MD  Vascular Neurology

## 2023-06-10 NOTE — OUTREACH NOTE
Prep Survey      Flowsheet Row Responses   [facility] patient discharged from? [place]   Is LACE score < 7 ? Yes   Eligibility [facility]   Date of Admission 06/09/23   Date of Discharge 06/10/23   Discharge Disposition Home or Self Care   Discharge diagnosis Dizziness   Does the patient have one of the following disease processes/diagnoses(primary or secondary)? Other   Does the patient have Home health ordered? No   Is there a DME ordered? No   Prep survey completed? Yes            Shawna MATIAS - Registered Nurse

## 2023-06-12 ENCOUNTER — TRANSITIONAL CARE MANAGEMENT TELEPHONE ENCOUNTER (OUTPATIENT)
Dept: CALL CENTER | Facility: HOSPITAL | Age: 68
End: 2023-06-12
Payer: MEDICARE

## 2023-06-12 NOTE — PROGRESS NOTES
Case Management Discharge Note      Final Note: Home         Selected Continued Care - Discharged on 6/10/2023 Admission date: 6/9/2023 - Discharge disposition: Home or Self Care      Destination    No services have been selected for the patient.                Durable Medical Equipment    No services have been selected for the patient.                Dialysis/Infusion    No services have been selected for the patient.                Home Medical Care    No services have been selected for the patient.                Therapy    No services have been selected for the patient.                Community Resources    No services have been selected for the patient.                Community & DME    No services have been selected for the patient.                    Transportation Services  Private: Car    Final Discharge Disposition Code: 01 - home or self-care

## 2023-06-12 NOTE — OUTREACH NOTE
Call Center TCM Note      Flowsheet Row Responses   [facility] patient discharged from? [facility]   Does the patient have one of the following disease processes/diagnoses(primary or secondary)? Other   TCM attempt successful? Yes   Call start time 1126   Call end time 1128   Discharge diagnosis Dizziness   Meds reviewed with patient/caregiver? Yes   Is the patient having any side effects they believe may be caused by any medication additions or changes? No   Does the patient have all medications ordered at discharge? Yes   Is the patient taking all medications as directed (includes completed medication regime)? Yes   Does the patient have an appointment with their PCP within 7 days of discharge? No   Nursing Interventions Patient declined scheduling/rescheduling appointment at this time   Has home health visited the patient within 72 hours of discharge? N/A   Psychosocial issues? No   Comments BP's have been good   What is the patient's perception of their health status since discharge? Improving   TCM call completed? Yes   Call end time 1128   Would this patient benefit from a Referral to Amb Social Work? No   Is the patient interested in additional calls from an ambulatory ?  NOTE:  applies to high risk patients requiring additional follow-up. No            Angeles Obrien RN    6/12/2023, 11:28 EDT         Patient made aware of below.

## 2023-06-15 ENCOUNTER — OFFICE VISIT (OUTPATIENT)
Dept: INTERNAL MEDICINE | Facility: CLINIC | Age: 68
End: 2023-06-15
Payer: MEDICARE

## 2023-06-15 VITALS
WEIGHT: 174.7 LBS | HEART RATE: 86 BPM | TEMPERATURE: 98 F | OXYGEN SATURATION: 98 % | DIASTOLIC BLOOD PRESSURE: 68 MMHG | SYSTOLIC BLOOD PRESSURE: 112 MMHG | HEIGHT: 67 IN | BODY MASS INDEX: 27.42 KG/M2

## 2023-06-15 DIAGNOSIS — R03.0 BLOOD PRESSURE ELEVATED WITHOUT HISTORY OF HTN: ICD-10-CM

## 2023-06-15 DIAGNOSIS — I77.3 FIBROMUSCULAR DYSPLASIA: ICD-10-CM

## 2023-06-15 DIAGNOSIS — H81.11 BPPV (BENIGN PAROXYSMAL POSITIONAL VERTIGO), RIGHT: Primary | ICD-10-CM

## 2023-06-15 DIAGNOSIS — Z86.69 HISTORY OF MIGRAINE: ICD-10-CM

## 2023-06-15 NOTE — PROGRESS NOTES
Transitional Care Follow Up Visit  Subjective     Radha Rivera is a 68 y.o. female who presents for a transitional care management visit.    Within 48 business hours after discharge our office contacted her via telephone to coordinate her care and needs.      I reviewed and discussed the details of that call along with the discharge summary, hospital problems, inpatient lab results, inpatient diagnostic studies, and consultation reports with Radha.     Current outpatient and discharge medications have been reconciled for the patient.  Reviewed by: Paolo Dc MD          6/10/2023     2:08 PM   Date of TCM Phone Call   [facility]   Date of Admission 6/9/2023   Date of Discharge 6/10/2023   Discharge Disposition Home or Self Care     Risk for Readmission (LACE) Score: 4 (6/10/2023  6:00 AM)      History of Present Illness   Radha Rivera is a 68 y.o. female RTC in TCM visit after recent inpt eval for positional dizziness and vertigo sx in last few months.  Often when sits up.  In 24 hours prior to admission 'was different than anything I had experienced'. Woke middle of night  'feeling weird' with some vague HA sx.  [place] weird the next AM and like arms with vaguely weak, foggy headed, slow word recall.  Went to urgent care at advice of daughter and had some high blood pressure.  Wanted to r/o stroke and sent to ED.   Not sx since discharge.  Feeling well.   Tracking pressures at home and has good numbers, no readings > 130/80. Feels like pressures elevated 'because I was there'.   Notes lots of stress with home remodeling.   No hx of BPPV.  Vertigo issue is NOT what took to the hospital.  The sx prior to admission did not include vertigo.   Had not been tracking blood pressure at home prior to this, 'no reason to'.     Course During Hospital Stay:   Per D/C Summary:   Brief admission history and physical.  Please refer to the H&P for full details.  A pleasant 68 years old white female  with a past history of bronchial asthma/cervical cancer/GERD who presents to the emergency department with rotational dizziness and feeling of dissociation/bilateral arm weakness/mental fogginess/slow speech.  Physical examination on admission included a blood pressure 161/84 a pulse of 87 temperature 98 respiratory rate of 17.  Rest of the examination is unremarkable.  Hospital course.  Initial ER evaluation included a CBC that was normal.  High-sensitivity troponin was normal.  PTT was normal.  CMP was normal except a random blood sugar of 103.  INR was normal.  TSH is normal high.  At 4.2.  Lipid profile is normal.  CT scan of the brain without contrast revealed no acute abnormalities.  CTA of the head and neck revealed small vessel disease but no significant narrowing.  The patient was admitted with vertigo to rule out a CVA.  Her symptomatology completely resolved.  CNS examination remained stable.  MRI of the brain revealed no acute abnormalities.  Neurology saw the patient and recommended outpatient follow-up for sleep study and vestibular rehab.  Patient was given meclizine as needed and aspirin at the time of discharge.  Again her CNS examination was normal at the time of discharge with resolution of her symptoms.  She did not have any cardiac symptoms during her hospital stay.  She was counseled against driving until she sees her primary care physician.  She has declined any institution of blood pressure medication until she monitors that as an outpatient discuss it with her primary care physician.  Her GERD remained stable on proton pump inhibitor with benign GI examination.  Her asthma remains stable on Singulair and as needed albuterol.  At the time of discharge she was hemodynamically stable.    The following portions of the patient's history were reviewed and updated as appropriate: allergies, current medications, past medical history, past social history, and problem list.    Review of Systems    Constitutional:  Negative for activity change, chills, fatigue (resolved after home from inpt) and fever.   Respiratory:  Negative for shortness of breath.    Cardiovascular:  Negative for chest pain and palpitations.   Neurological:  Positive for dizziness. Negative for tremors, syncope, facial asymmetry, speech difficulty (resolved while inpt), numbness (resvoled in arms inpt) and headaches (resolved inpt).     Objective   Physical Exam  Constitutional:       General: She is not in acute distress.     Appearance: Normal appearance. She is normal weight. She is not ill-appearing or toxic-appearing.   HENT:      Head: Normocephalic and atraumatic.      Right Ear: Tympanic membrane, ear canal and external ear normal.      Left Ear: Tympanic membrane, ear canal and external ear normal.   Eyes:      General: No scleral icterus.     Extraocular Movements: Extraocular movements intact.      Conjunctiva/sclera: Conjunctivae normal.      Pupils: Pupils are equal, round, and reactive to light.   Neck:      Vascular: No carotid bruit.   Cardiovascular:      Rate and Rhythm: Normal rate and regular rhythm.      Heart sounds: No murmur heard.    No friction rub. No gallop.   Pulmonary:      Effort: Pulmonary effort is normal. No respiratory distress.      Breath sounds: No wheezing, rhonchi or rales.   Musculoskeletal:      Cervical back: Normal range of motion and neck supple. No tenderness.      Right lower leg: No edema.      Left lower leg: No edema.   Lymphadenopathy:      Cervical: No cervical adenopathy.   Neurological:      General: No focal deficit present.      Mental Status: She is alert.      Cranial Nerves: No cranial nerve deficit, dysarthria or facial asymmetry.      Gait: Gait normal.      Comments: Zachery Hallpike (+) on R with sx; offset ~5 seconds; (-) on L     Psychiatric:         Mood and Affect: Mood normal.         Behavior: Behavior normal.         Thought Content: Thought content normal.          Assessment & Plan   Diagnoses and all orders for this visit:    1. BPPV (benign paroxysmal positional vertigo), right (Primary)    2. Blood pressure elevated without history of HTN    3. History of migraine    4. Fibromuscular dysplasia      Radha Rivera is a 68 y.o. female with distant hx of migraine RTC in TCM visit after admission 6/9-6/10 for vague HA, arm weakness, word finding issue, and 'not feeling right'.  Stroke eval negative with CTA head/ neck and MRI brain notable only for V3 segment mild beading appearance (?FMD isolated) and mild small vessel disease. Sx resolved inpt.  Elevated blood pressures inpt, returned to normal on home log and in office today, presume stress related HTN'sam response inpt (see home log in photo).  I suspect pt had migrainous event based on hx of sx associated with HA and stress with self directed home remodeling.  Sx fully resolved at this time. Counseled on checking pressure at home and to not react to any one reading, resting with addt'l pressure checks for any high readings. No new meds today. No additional eval for V3 mild beading, reviewed with pt.     BPPV, R - secondary issue, not what took pt to [facility]/ ED.  Pt notes long hx, deferred eval and tx on her own accord. [place]-Hallpike positive on exam. I strongly suggested pt complete PT formal testing and tx regimen to resolve issue. 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 Pt agrees to schedule with PT.     RTC as planned.

## 2023-07-27 ENCOUNTER — TREATMENT (OUTPATIENT)
Dept: PHYSICAL THERAPY | Facility: CLINIC | Age: 68
End: 2023-07-27
Payer: MEDICARE

## 2023-07-27 DIAGNOSIS — H81.11 BPPV (BENIGN PAROXYSMAL POSITIONAL VERTIGO), RIGHT: Primary | ICD-10-CM

## 2023-07-27 NOTE — PATIENT INSTRUCTIONS
Patient was educated on BPPV dx, CRP treatment, HEP and post CRP instructions.  HEP: R Modified Semont

## 2023-07-27 NOTE — PROGRESS NOTES
Physical Therapy Daily Progress Note-Vestibular Rehab  [facility] [place]  [address], [address]  [facility] [address]      Visit#:2  Subjective   The spinning is much better. I have not had anymore of that but I feel like I am still having pressure in my head and very slight dizziness at times.   Objective            Positional Testing  Positional Testing: With infrared goggles  [place]-Hallpike Right: Downbeat Nystagmus  [place]-Hallpike Right Onset Time : 3 sec  [place]-Hallpike Right Duration Time : 30 sec  [place]-Hallpike Left: Downbeat Nystagmus  [place]-Hallpike Left Onset Time : immediate  Zachery-Hallpike Left Duration Time : > 1 min  Horizontal Roll Test Right: Left-beating  Horizontal Roll Test Right Onset Time : immediate  Horizontal Roll Test Right Duration Time : > 1 min  Horizontal Roll Test Left: Right-beating  Horizontal Roll Test Left Onset Time : 3 sec  Horizontal Roll Test Left Duration Time : > 1 min  Treatment: R Modified [facility]             PROCEDURES AND MODALITIES:  See Exercise, Manual, and Modality Logs for complete treatment.     Paraffin:   pre-rx  Moist Heat:    Ice:    post-rx  E-Stim:    Ultrasound:    Ionto:   Traction:      Ther Act 40101 8 minutes and Other Procedure CPT 38917 minutes 10    Timed Code Treatment: 8 Minutes  Total Treatment Time: 30 Minutes    Assessment & Plan   R PC Canalithiasis BPPV is resolved. She is not yet symptom free but is significantly improved. She is demonstrating downbeating nystagmus that was present in B DH positions. It was stronger on the L but she was more symptomatic on the R, thus the R AC was treated for cupulolithiasis BPPV. Tolerated treatment well. Issued for [facility].     Progress per Plan of Care    Enma Gómez, PT, DPT, CHT, LIZZETTEN  Physical Therapist  [place] license # [id number]

## 2023-08-01 ENCOUNTER — TREATMENT (OUTPATIENT)
Dept: PHYSICAL THERAPY | Facility: CLINIC | Age: 68
End: 2023-08-01
Payer: MEDICARE

## 2023-08-01 DIAGNOSIS — H81.12 BPPV (BENIGN PAROXYSMAL POSITIONAL VERTIGO), LEFT: ICD-10-CM

## 2023-08-01 DIAGNOSIS — H81.11 BPPV (BENIGN PAROXYSMAL POSITIONAL VERTIGO), RIGHT: Primary | ICD-10-CM

## 2023-08-24 ENCOUNTER — TREATMENT (OUTPATIENT)
Dept: PHYSICAL THERAPY | Facility: CLINIC | Age: 68
End: 2023-08-24
Payer: MEDICARE

## 2023-08-24 DIAGNOSIS — H81.12 BPPV (BENIGN PAROXYSMAL POSITIONAL VERTIGO), LEFT: ICD-10-CM

## 2023-08-24 DIAGNOSIS — H81.11 BPPV (BENIGN PAROXYSMAL POSITIONAL VERTIGO), RIGHT: Primary | ICD-10-CM

## 2023-08-24 NOTE — PROGRESS NOTES
Physical Therapy Monthly Progress Note  [facility] [place]  [address]  [place] [address]    Name: Radha Rivera  Date: 08/24/2023  Diagnosis/ICD-10 Code:  BPPV (benign paroxysmal positional vertigo), right [H81.11]  Referring practitioner: Jonny Sanchez MD  Date of Initial Visit: 7/20/2023  Visit #: 4  Subjective:   Radha Rivera reports: Patient is reporting about 80-90% better. I still have some dizziness when I lie on my L side. I did not do the home exercise because it was too difficult to do. I did try the L Epley but that did not help. I have tried the home balance exercises some.   Subjective Questionnaire: DHI: 14/100,  improved from 28/100  Clinical Progress: improved  Home Program Compliance: Yes  Treatment has included: therapeutic activity and CRP  Objective:   Objective       Positional Testing  Positional Testing: With infrared goggles  [place]-Hallpike Right: Downbeat Nystagmus  Zacher[place]-Hallpike Right Onset Time : 10 sec  [place]-Hallpike Right Duration Time : > 1 min  Zacher[place]-Hallpike Left: Downbeat, left rotatory nystagmus  Zacher[place]-Hallpike Left Onset Time : immediate  Zachery-Hallpike Left Duration Time : 60 sec                  Assessment:   Functional Limitations: Difficulty moving, Decreased ability to perform ADL's  Patient is reporting significant improvement in symptoms overall. She continues to have a positive test for L AC Cupulolithiasis BPPV. She tolerates Modified [place] well in clinic but, due to the nature of the maneuver, she has not tried it at home. I suspect it is going to require repetition of this CRP to clear BPPV. Encouraged home treatment. Pt is progressing well under current treatment plan and will continue to improve with skilled PT and HEP performance. Additional PT warranted to clear BPPV and reduce fall risk to improve QOL and return to PLOF.  Plan:   PT Interventions: Canal Repositioning Procedure, Home Exercise Program  Progress toward previous  goals: Partially Met  SHORT TERM GOALS: To be met in 2 weeks: (ALL STGS MET)  1. Patient is independent with HEP.  2. Patient will report at least 30% improvement in overall condition.  3. Patient will report no falls.  LONG TERM GOALS:To be met in 4 weeks:  1. Patient will report decreased disequilibrium/dizziness by at least 90% which demonstrates improved quality of life.PROGRESSING  2. Patient will report no loss of balance with ADLs to demonstrate improved functional balance and reduced risk for falls. MET   3. Patient denies dizziness with daily activity especially with transitional movements. PROGRESSING  4. DHI score is less than 10 to demonstrate improved balance and dizziness. PROGRESSING     Recommendations: Continue as planned  Timeframe: 1 month,, 1 x a week  Prognosis to achieve goals: good    08/24/2023 Treatments:     Ther Act 86542 8 minutes and Other Procedure CPT 30464 minutes 12    Timed Code Treatment: 8   Minutes     Total Treatment Time: 30      Minutes    PT: Enma Gómez PT, DPT, CHT, CIDN  License Number: [id number]  Electronically signed by Enma Gómez PT, 08/24/23, 7:46 AM EDT    Certification Period: 8/24/2023 thru 11/21/2023  I certify that the therapy services are furnished while this patient is under my care.  The services outlined above are required by this patient, and will be reviewed every 90 days.         Physician Signature:__________________________________________________    PHYSICIAN: Jonny Sanchez MD  NPI: [id number]                                      DATE:    Please sign and return via fax to [phone] at [facility] . Thank you, [facility]

## 2023-09-06 ENCOUNTER — TELEPHONE (OUTPATIENT)
Dept: ORTHOPEDICS | Facility: OTHER | Age: 68
End: 2023-09-06
Payer: MEDICARE

## 2023-09-06 NOTE — TELEPHONE ENCOUNTER
SHE IS NOT HAVING SYMPTOMS AND CANCELLED ALL APPOINTMENTS.  SHE SAID TO THANK GOOD FOR ALL HER HELP, AND IF SHE NEEDS TO SHE WILL CALL TO RESCHEDULE.

## 2023-10-23 ENCOUNTER — OFFICE VISIT (OUTPATIENT)
Dept: OBSTETRICS AND GYNECOLOGY | Age: 68
End: 2023-10-23
Payer: MEDICARE

## 2023-10-23 VITALS
SYSTOLIC BLOOD PRESSURE: 122 MMHG | HEIGHT: 67 IN | DIASTOLIC BLOOD PRESSURE: 70 MMHG | BODY MASS INDEX: 27.94 KG/M2 | WEIGHT: 178 LBS

## 2023-10-23 DIAGNOSIS — Z01.419 ENCOUNTER FOR GYNECOLOGICAL EXAMINATION: Primary | ICD-10-CM

## 2023-10-23 NOTE — PROGRESS NOTES
Subjective       History of Present Illness    Chief Complaint   Patient presents with    Gynecologic Exam     Annual exam last pap 2021 neg/neg, m/g 2023, Dex[facility] , [facility] 2016       Radha Rivera is a 68 y.o. female who presents for annual exam.  Patient of Dr. Pablo  No gyn complaints  She is struggling with her weight and has trouble losing- looking for a different diet to try  No vaginal bleeding  No bowel or bladder problems      OB History    Para Term  AB Living   2 2 2         SAB IAB Ectopic Molar Multiple Live Births                    # Outcome Date GA Lbr [place]/2nd Weight Sex Delivery Anes PTL Lv   2 Term            1 Term                The following portions of the patient's history were reviewed and updated as appropriate: allergies, current medications, past family history, past medical history, past social history, past surgical history and problem list.        Current contraception: post menopausal status  History of abnormal Pap smear: yes - years ago  Perform regular self breast exam: no  Family history of uterine or ovarian cancer: no  Family History of colon cancer: no  Family history of breast cancer: yes - maternal grandmother (82)    Mammogram: up to date.  Colonoscopy: up to date.  DEXA: up to date.  Last Pap: neg    Social History    Tobacco Use      Smoking status: Never      Smokeless tobacco: Never    Exercise: moderately active  Calcium/Vitamin D: uses supplements    The following portions of the patient's history were reviewed and updated as appropriate: allergies, current medications, past family history, past medical history, past social history, past surgical history, and problem list.    Review of Systems   Constitutional: Negative.    HENT: Negative.     Eyes: Negative.    Respiratory: Negative.     Cardiovascular: Negative.    Gastrointestinal: Negative.    Endocrine: Negative.    Genitourinary: Negative.    Musculoskeletal: Negative.    Skin: Negative.  "   Allergic/Immunologic: Negative.    Neurological: Negative.    Hematological: Negative.    Psychiatric/Behavioral: Negative.           Objective   Physical Exam  Vitals reviewed.   Constitutional:       Appearance: She is well-developed.   Neck:      Thyroid: No thyroid mass.   Cardiovascular:      Rate and Rhythm: Normal rate and regular rhythm.      Heart sounds: Normal heart sounds.   Pulmonary:      Effort: Pulmonary effort is normal.      Breath sounds: Normal breath sounds.   Chest:   Breasts:     Right: No mass, nipple discharge, skin change or tenderness.      Left: No mass, nipple discharge, skin change or tenderness.   Abdominal:      Palpations: Abdomen is soft.      Tenderness: There is no abdominal tenderness.   Genitourinary:     Labia:         Right: No rash or lesion.         Left: No rash or lesion.       Vagina: Normal.      Cervix: No cervical motion tenderness, discharge or friability.      Adnexa:         Right: No mass or tenderness.          Left: No mass or tenderness.        Comments: + vaginal atrophy  Neurological:      Mental Status: She is alert and oriented to person, place, and time.   Psychiatric:         Behavior: Behavior normal.       /70   Ht 170.2 cm (67\")   Wt 80.7 kg (178 lb)   BMI 27.88 kg/m²     Assessment & Plan   Diagnoses and all orders for this visit:    1. Encounter for gynecological examination (Primary)  -     IGP, Apt HPV,rfx 16 / 18,45          Breast self exam technique reviewed and patient encouraged to perform self-exam monthly.  Discussed healthy lifestyle modifications.  Pap smear done with HPV  Recommended 30 minutes of aerobic exercise five times per week.  Discussed calcium needs to prevent osteoporosis         "

## 2023-10-26 ENCOUNTER — TELEPHONE (OUTPATIENT)
Dept: OBSTETRICS AND GYNECOLOGY | Age: 68
End: 2023-10-26
Payer: MEDICARE

## 2023-11-01 ENCOUNTER — HOSPITAL ENCOUNTER (EMERGENCY)
Facility: HOSPITAL | Age: 68
Discharge: HOME | End: 2023-11-01
Attending: EMERGENCY MEDICINE | Admitting: EMERGENCY MEDICINE
Payer: MEDICARE

## 2023-11-01 ENCOUNTER — APPOINTMENT (OUTPATIENT)
Dept: GENERAL RADIOLOGY | Facility: HOSPITAL | Age: 68
End: 2023-11-01
Payer: MEDICARE

## 2023-11-01 VITALS
RESPIRATION RATE: 19 BRPM | DIASTOLIC BLOOD PRESSURE: 66 MMHG | BODY MASS INDEX: 26.68 KG/M2 | WEIGHT: 170 LBS | SYSTOLIC BLOOD PRESSURE: 144 MMHG | OXYGEN SATURATION: 94 % | HEART RATE: 114 BPM | TEMPERATURE: 99.6 F | HEIGHT: 67 IN

## 2023-11-01 DIAGNOSIS — J06.9 VIRAL URI WITH COUGH: Primary | ICD-10-CM

## 2023-11-01 LAB
ALBUMIN SERPL-MCNC: 4.4 G/DL (ref 3.5–5.2)
ALBUMIN/GLOB SERPL: 1.6 G/DL
ALP SERPL-CCNC: 90 U/L (ref 39–117)
ALT SERPL W P-5'-P-CCNC: 15 U/L (ref 1–33)
ANION GAP SERPL CALCULATED.3IONS-SCNC: 11 MMOL/L (ref 5–15)
AST SERPL-CCNC: 16 U/L (ref 1–32)
BASOPHILS # BLD AUTO: 0.02 10*3/MM3 (ref 0–0.2)
BASOPHILS NFR BLD AUTO: 0.2 % (ref 0–1.5)
BILIRUB SERPL-MCNC: 0.6 MG/DL (ref 0–1.2)
BUN SERPL-MCNC: 15 MG/DL (ref 8–23)
BUN/CREAT SERPL: 23.1 (ref 7–25)
CALCIUM SPEC-SCNC: 9.6 MG/DL (ref 8.6–10.5)
CHLORIDE SERPL-SCNC: 100 MMOL/L (ref 98–107)
CO2 SERPL-SCNC: 26 MMOL/L (ref 22–29)
CREAT SERPL-MCNC: 0.65 MG/DL (ref 0.57–1)
D DIMER PPP FEU-MCNC: 0.29 MCGFEU/ML (ref 0–0.69)
D-LACTATE SERPL-SCNC: 1.1 MMOL/L (ref 0.5–2)
DEPRECATED RDW RBC AUTO: 45.2 FL (ref 37–54)
EGFRCR SERPLBLD CKD-EPI 2021: 95.4 ML/MIN/1.73
EOSINOPHIL # BLD AUTO: 0.06 10*3/MM3 (ref 0–0.4)
EOSINOPHIL NFR BLD AUTO: 0.5 % (ref 0.3–6.2)
ERYTHROCYTE [DISTWIDTH] IN BLOOD BY AUTOMATED COUNT: 13.2 % (ref 12.3–15.4)
FLUAV SUBTYP SPEC NAA+PROBE: NOT DETECTED
FLUBV RNA ISLT QL NAA+PROBE: NOT DETECTED
GLOBULIN UR ELPH-MCNC: 2.7 GM/DL
GLUCOSE SERPL-MCNC: 126 MG/DL (ref 65–99)
HCT VFR BLD AUTO: 43 % (ref 34–46.6)
HGB BLD-MCNC: 13.7 G/DL (ref 12–15.9)
IMM GRANULOCYTES # BLD AUTO: 0.02 10*3/MM3 (ref 0–0.05)
IMM GRANULOCYTES NFR BLD AUTO: 0.2 % (ref 0–0.5)
LYMPHOCYTES # BLD AUTO: 1.09 10*3/MM3 (ref 0.7–3.1)
LYMPHOCYTES NFR BLD AUTO: 9.7 % (ref 19.6–45.3)
MCH RBC QN AUTO: 29.5 PG (ref 26.6–33)
MCHC RBC AUTO-ENTMCNC: 31.9 G/DL (ref 31.5–35.7)
MCV RBC AUTO: 92.7 FL (ref 79–97)
MONOCYTES # BLD AUTO: 0.67 10*3/MM3 (ref 0.1–0.9)
MONOCYTES NFR BLD AUTO: 5.9 % (ref 5–12)
NEUTROPHILS NFR BLD AUTO: 83.5 % (ref 42.7–76)
NEUTROPHILS NFR BLD AUTO: 9.43 10*3/MM3 (ref 1.7–7)
PLATELET # BLD AUTO: 184 10*3/MM3 (ref 140–450)
PMV BLD AUTO: 9.9 FL (ref 6–12)
POTASSIUM SERPL-SCNC: 4 MMOL/L (ref 3.5–5.2)
PROT SERPL-MCNC: 7.1 G/DL (ref 6–8.5)
RBC # BLD AUTO: 4.64 10*6/MM3 (ref 3.77–5.28)
SARS-COV-2 RNA RESP QL NAA+PROBE: NOT DETECTED
SODIUM SERPL-SCNC: 137 MMOL/L (ref 136–145)
STREP A PCR: NOT DETECTED
WBC NRBC COR # BLD: 11.29 10*3/MM3 (ref 3.4–10.8)

## 2023-11-01 PROCEDURE — 99283 EMERGENCY DEPT VISIT LOW MDM: CPT

## 2023-11-01 PROCEDURE — 87636 SARSCOV2 & INF A&B AMP PRB: CPT

## 2023-11-01 PROCEDURE — 25010000002 METHYLPREDNISOLONE PER 125 MG: Performed by: EMERGENCY MEDICINE

## 2023-11-01 PROCEDURE — 99283 EMERGENCY DEPT VISIT LOW MDM: CPT | Performed by: EMERGENCY MEDICINE

## 2023-11-01 PROCEDURE — 25810000003 SODIUM CHLORIDE 0.9 % SOLUTION: Performed by: EMERGENCY MEDICINE

## 2023-11-01 PROCEDURE — 80053 COMPREHEN METABOLIC PANEL: CPT | Performed by: EMERGENCY MEDICINE

## 2023-11-01 PROCEDURE — 71045 X-RAY EXAM CHEST 1 VIEW: CPT

## 2023-11-01 PROCEDURE — 85379 FIBRIN DEGRADATION QUANT: CPT | Performed by: EMERGENCY MEDICINE

## 2023-11-01 PROCEDURE — 83605 ASSAY OF LACTIC ACID: CPT | Performed by: EMERGENCY MEDICINE

## 2023-11-01 PROCEDURE — 85025 COMPLETE CBC W/AUTO DIFF WBC: CPT | Performed by: EMERGENCY MEDICINE

## 2023-11-01 PROCEDURE — 87651 STREP A DNA AMP PROBE: CPT | Performed by: EMERGENCY MEDICINE

## 2023-11-01 PROCEDURE — 96374 THER/PROPH/DIAG INJ IV PUSH: CPT

## 2023-11-01 RX ORDER — ALBUTEROL SULFATE 90 UG/1
2 AEROSOL, METERED RESPIRATORY (INHALATION) EVERY 4 HOURS PRN
Qty: 18 G | Refills: 0 | Status: SHIPPED | OUTPATIENT
Start: 2023-11-01

## 2023-11-01 RX ORDER — ALBUTEROL SULFATE 2.5 MG/3ML
2.5 SOLUTION RESPIRATORY (INHALATION) ONCE
Status: COMPLETED | OUTPATIENT
Start: 2023-11-01 | End: 2023-11-01

## 2023-11-01 RX ORDER — IPRATROPIUM BROMIDE AND ALBUTEROL SULFATE 2.5; .5 MG/3ML; MG/3ML
3 SOLUTION RESPIRATORY (INHALATION) ONCE
Status: COMPLETED | OUTPATIENT
Start: 2023-11-01 | End: 2023-11-01

## 2023-11-01 RX ORDER — ACETAMINOPHEN 500 MG
1000 TABLET ORAL ONCE
Status: COMPLETED | OUTPATIENT
Start: 2023-11-01 | End: 2023-11-01

## 2023-11-01 RX ORDER — DEXAMETHASONE 4 MG/1
4 TABLET ORAL 3 TIMES DAILY
Qty: 12 TABLET | Refills: 0 | OUTPATIENT
Start: 2023-11-01 | End: 2023-11-10

## 2023-11-01 RX ORDER — METHYLPREDNISOLONE SODIUM SUCCINATE 125 MG/2ML
125 INJECTION, POWDER, LYOPHILIZED, FOR SOLUTION INTRAMUSCULAR; INTRAVENOUS ONCE
Status: COMPLETED | OUTPATIENT
Start: 2023-11-01 | End: 2023-11-01

## 2023-11-01 RX ADMIN — SODIUM CHLORIDE 1000 ML: 9 INJECTION, SOLUTION INTRAVENOUS at 05:22

## 2023-11-01 RX ADMIN — IPRATROPIUM BROMIDE AND ALBUTEROL SULFATE 3 ML: 2.5; .5 SOLUTION RESPIRATORY (INHALATION) at 05:39

## 2023-11-01 RX ADMIN — ACETAMINOPHEN 1000 MG: 500 TABLET ORAL at 04:53

## 2023-11-01 RX ADMIN — METHYLPREDNISOLONE SODIUM SUCCINATE 125 MG: 125 INJECTION, POWDER, FOR SOLUTION INTRAMUSCULAR; INTRAVENOUS at 06:29

## 2023-11-01 RX ADMIN — ALBUTEROL SULFATE 2.5 MG: 2.5 SOLUTION RESPIRATORY (INHALATION) at 05:39

## 2023-11-01 NOTE — DISCHARGE INSTRUCTIONS
Take vitamin D 5000 international units for the next 5 days and then go back to 2000 international units/day.  Take vitamin C 2000mg/day x5 days and then go back to 1000 mg/day  Take zinc 50 mg each day.  You must breathe through your nose you cannot be a mouth breather your O2 saturations will drop and you are more likely to not get better as fast.  You should get a pulse oximeter at the [facility] this will monitor from your finger what your saturations are breathing deeply through your nose will keep your oxygen saturations in the mid 90s.  Finish your steroid until gone use your albuterol every 3-4 hours.  Return to the emergency department if you are worse do not stay at home if your saturations cannot get back above 90%.  I think you have COVID is just not positive yet stay home for 5 days you can may return to work on Monday.

## 2023-11-01 NOTE — Clinical Note
[facility]  [address]  [facility] [id number]    Radha Rivera was seen and treated in our emergency department on 11/1/2023.  She may return to work on 11/06/2023.         Thank you for choosing [facility].    Jose Flores MD

## 2023-11-01 NOTE — FSED PROVIDER NOTE
Subjective   History of Present Illness  Patient is complaining of a dry hacking cough for the last 2 days.  Yesterday she started with a little bit of nausea no GI upset she states but that is not today the [place] cough continue slight sore throat.  She states several people in the office all have COVID.  She is achy all over she has not been wheezing but she is out of her inhaler.  She is not a smoker.  She has environmental allergies.  She has been chilling this morning.      Review of Systems   Constitutional:  Positive for chills and fever.   HENT:  Positive for sore throat.    Respiratory:  Positive for cough.    Musculoskeletal:  Positive for myalgias.       Past Medical History:   Diagnosis Date    Asthma     Cancer     Carcinoma in situ of cervix     IN     Liver hemangioma     s/p imaging eval in past    Overweight (BMI 25.0-29.9) 10/29/2019    Pap smear abnormality of cervix with ASCUS favoring benign         PMB (postmenopausal bleeding)        Allergies   Allergen Reactions    Zostavax [Zoster Vaccine Live] Itching       Past Surgical History:   Procedure Laterality Date    CATARACT EXTRACTION, BILATERAL Bilateral 2019     SECTION      CHOLECYSTECTOMY  2016    COLONOSCOPY N/A 2016    Procedure: COLONOSCOPY TO CECUM;  Surgeon: Aisha Partida MD;  Location: [facility] ENDOSCOPY;  Service:     DILATATION AND CURETTAGE      DILATION AND CURETTAGE, DIAGNOSTIC / THERAPEUTIC      endometrial hyperplasia    TONSILLECTOMY         Family History   Problem Relation Age of Onset    Hypertension Mother     Thyroid disease Mother     Osteoporosis Mother     Alcohol abuse Father     Cancer Maternal Grandmother         breast cancer    Breast cancer Maternal Grandmother     Cancer Cousin         breast cancer - two cousins    Breast cancer Cousin     No Known Problems Son     No Known Problems Daughter        Social History     Socioeconomic History    Marital status:     Number of  children: 2   Tobacco Use    Smoking status: Never     Passive exposure: Never    Smokeless tobacco: Never   Vaping Use    Vaping Use: Never used   Substance and Sexual Activity    Alcohol use: Yes     Comment: 2-4 glasses wine/ week    Drug use: No    Sexual activity: Yes     Partners: Male     Comment:  only; HPV in past           Objective   Physical Exam  Vitals and nursing note reviewed.   Constitutional:       General: She is not in acute distress.     Appearance: Normal appearance. She is not ill-appearing, toxic-appearing or diaphoretic.   HENT:      Head: Normocephalic and atraumatic.      Nose: Nose normal.      Mouth/Throat:      Comments: Patient has slight anterior pillar erythema symmetric tonsils  Eyes:      Extraocular Movements: Extraocular movements intact.      Pupils: Pupils are equal, round, and reactive to light.   Cardiovascular:      Rate and Rhythm: Tachycardia present.      Heart sounds: Normal heart sounds. No murmur heard.     No friction rub.      Comments: Tachycardic without rub or murmur  Pulmonary:      Effort: Pulmonary effort is normal. No respiratory distress.      Breath sounds: Normal breath sounds.      Comments: Cough is got a hump to it indicating some bronchospasm but there is no wheezing.  Abdominal:      Palpations: Abdomen is soft.      Tenderness: There is no abdominal tenderness.   Musculoskeletal:         General: Normal range of motion.      Cervical back: Normal range of motion and neck supple.   Skin:     General: Skin is warm and dry.      Capillary Refill: Capillary refill takes less than 2 seconds.   Neurological:      Mental Status: She is alert and oriented to person, place, and time.   Psychiatric:         Mood and Affect: Mood normal.         Procedures           ED Course                                           Medical Decision Making  Laboratories are essentially normal chest x-ray is negative.    I evaluated the patient 6:35.  The patient is  improved she is no longer got a home with any coughing she states it is lighter and for her to breathe she does clear her throat constantly which does drop her sats down into the low 90s she sits at 95-98 at any other time.  The patient's laboratory testing is all normal I told her about all the values.  I think she is COVID she just not positive yet her coworkers who she works with on a close basis are all positive and she most likely has this I treated her with steroids I talked to her about vitamin C D and zinc in the high doses to try to help with this virus.  She will be given steroids for home the vitamins she will take she will be off for 5 days till Monday and quarantine and return here if she gets worse.  I think her remaining heart rate is due to some dehydration from her gastritis gastroenteritis she had yesterday the liter of fluid is in her heart rates anywhere from 1 13-1 19 at this time which is improved her oxygen saturation at this moment is 90 at this dictation but again that she breathes through her nose and is not clearing her throat she is satting higher than that.  She is comfortable going home I will refill her inhaler.    Problems Addressed:  Viral URI with cough: complicated acute illness or injury    Amount and/or Complexity of Data Reviewed  Labs: ordered.     Details: Patient's laboratories are essentially normal the white count is 11.29 minimally elevated the patient's D-dimer is normal at 0.29 lactic 1.1 all normal no sepsis no clots this sugar minimally elevated 126 but the electrolytes are normal no metabolic acidosis liver enzymes are within the normal range she is not anemic at 13.7.  Strep influenza and COVID are all negative.  Radiology:      Details: Normal cardiopulmonary shadow    Risk  Prescription drug management.        Final diagnoses:   Viral URI with cough       ED Disposition  ED Disposition       ED Disposition   Discharge    Condition   Stable    Comment   --                Paolo Dc MD  [address]  [facility] [id number] [phone]    In 1 week           Medication List        New Prescriptions      dexAMETHasone 4 MG tablet  Commonly known as: DECADRON  Take 1 tablet by mouth 3 (Three) Times a Day.            Changed      * ProAir  (90 Base) MCG/ACT inhaler  Generic drug: albuterol sulfate HFA  What changed: Another medication with the same name was added. Make sure you understand how and when to take each.     * albuterol sulfate  (90 Base) MCG/ACT inhaler  Commonly known as: PROVENTIL HFA;VENTOLIN HFA;PROAIR HFA  Inhale 2 puffs Every 4 (Four) Hours As Needed for Wheezing.  What changed: You were already taking a medication with the same name, and this prescription was added. Make sure you understand how and when to take each.           * This list has 2 medication(s) that are the same as other medications prescribed for you. Read the directions carefully, and ask your doctor or other care provider to review them with you.                   Where to Get Your Medications        These medications were sent to [id number] - [place], [place] - [address] AT [address] - [phone]  - [phone] fx [address], [facility] [address]      Phone: [phone]   albuterol sulfate  (90 Base) MCG/ACT inhaler  dexAMETHasone 4 MG tablet

## 2023-11-06 ENCOUNTER — TELEPHONE (OUTPATIENT)
Dept: INTERNAL MEDICINE | Facility: CLINIC | Age: 68
End: 2023-11-06

## 2023-11-06 NOTE — TELEPHONE ENCOUNTER
I am sorry to hear about this interaction.   Please call pt and offer appt in office this week if desires.

## 2023-11-06 NOTE — TELEPHONE ENCOUNTER
"    Caller: Radha Rivera \"Charleen Rivera\"    Relationship to patient: Self    Best call back number: [phone]     Chief complaint: PATIENT CALLED STATING SHE WAS HAVING TIGHTNESS IN HER CHEST AND SHORTNESS OF BREATH. SHE WAS ADVISED TO GO TO ER DUE TO HER SYMPTOMS, PATIENT SAID SHE HAD ALREADY DONE THAT, SAID THANKS AND DISCONNECTED THE CALL. HUB ATTEMPTED TO CALL PATIENT BACK AND IT WENT STRAIGHT TO VOICEMAIL.     Patient directed to call 911 or go to their nearest emergency room.     Patient verbalized understanding: [] Yes  [x] No  If no, why?  "

## 2023-11-06 NOTE — TELEPHONE ENCOUNTER
Called and left voicemail for pt to give office a call back to schedule an office visit to be seen for symptoms.

## 2023-11-21 ENCOUNTER — OFFICE VISIT (OUTPATIENT)
Dept: INTERNAL MEDICINE | Facility: CLINIC | Age: 68
End: 2023-11-21
Payer: MEDICARE

## 2023-11-21 ENCOUNTER — HOSPITAL ENCOUNTER (OUTPATIENT)
Dept: CARDIOLOGY | Facility: HOSPITAL | Age: 68
Discharge: HOME | End: 2023-11-21
Admitting: STUDENT IN AN ORGANIZED HEALTH CARE EDUCATION/TRAINING PROGRAM
Payer: MEDICARE

## 2023-11-21 ENCOUNTER — TELEPHONE (OUTPATIENT)
Dept: INTERNAL MEDICINE | Facility: CLINIC | Age: 68
End: 2023-11-21

## 2023-11-21 VITALS
BODY MASS INDEX: 27.81 KG/M2 | OXYGEN SATURATION: 97 % | HEIGHT: 67 IN | WEIGHT: 177.2 LBS | DIASTOLIC BLOOD PRESSURE: 70 MMHG | SYSTOLIC BLOOD PRESSURE: 132 MMHG | HEART RATE: 82 BPM | TEMPERATURE: 98.2 F

## 2023-11-21 DIAGNOSIS — M79.601 RIGHT ARM PAIN: ICD-10-CM

## 2023-11-21 DIAGNOSIS — J45.30 MILD PERSISTENT ASTHMA WITHOUT COMPLICATION: ICD-10-CM

## 2023-11-21 DIAGNOSIS — M79.601 RIGHT ARM PAIN: Primary | ICD-10-CM

## 2023-11-21 DIAGNOSIS — R19.7 DIARRHEA, UNSPECIFIED TYPE: ICD-10-CM

## 2023-11-21 DIAGNOSIS — R53.1 WEAKNESS: ICD-10-CM

## 2023-11-21 LAB
ALBUMIN SERPL-MCNC: 4 G/DL (ref 3.5–5.2)
ALBUMIN/GLOB SERPL: 2.4 G/DL
ALP SERPL-CCNC: 69 U/L (ref 39–117)
ALT SERPL-CCNC: 43 U/L (ref 1–33)
AST SERPL-CCNC: 36 U/L (ref 1–32)
BASOPHILS # BLD AUTO: 0.01 10*3/MM3 (ref 0–0.2)
BASOPHILS NFR BLD AUTO: 0.2 % (ref 0–1.5)
BH CV UPPER VENOUS LEFT INTERNAL JUGULAR COMPRESS: NORMAL
BH CV UPPER VENOUS LEFT INTERNAL JUGULAR PHASIC: NORMAL
BH CV UPPER VENOUS LEFT INTERNAL JUGULAR SPONT: NORMAL
BH CV UPPER VENOUS LEFT SUBCLAVIAN AUGMENT: NORMAL
BH CV UPPER VENOUS LEFT SUBCLAVIAN COMPRESS: NORMAL
BH CV UPPER VENOUS LEFT SUBCLAVIAN PHASIC: NORMAL
BH CV UPPER VENOUS LEFT SUBCLAVIAN SPONT: NORMAL
BH CV UPPER VENOUS RIGHT AXILLARY AUGMENT: NORMAL
BH CV UPPER VENOUS RIGHT AXILLARY COMPRESS: NORMAL
BH CV UPPER VENOUS RIGHT AXILLARY PHASIC: NORMAL
BH CV UPPER VENOUS RIGHT AXILLARY SPONT: NORMAL
BH CV UPPER VENOUS RIGHT BASILIC FOREARM COMPRESS: NORMAL
BH CV UPPER VENOUS RIGHT BASILIC UPPER COMPRESS: NORMAL
BH CV UPPER VENOUS RIGHT BRACHIAL COMPRESS: NORMAL
BH CV UPPER VENOUS RIGHT CEPHALIC FOREARM COMPRESS: NORMAL
BH CV UPPER VENOUS RIGHT CEPHALIC UPPER COMPRESS: NORMAL
BH CV UPPER VENOUS RIGHT INTERNAL JUGULAR COMPRESS: NORMAL
BH CV UPPER VENOUS RIGHT INTERNAL JUGULAR PHASIC: NORMAL
BH CV UPPER VENOUS RIGHT INTERNAL JUGULAR SPONT: NORMAL
BH CV UPPER VENOUS RIGHT MED CUBITAL COMPRESS: NORMAL
BH CV UPPER VENOUS RIGHT RADIAL COMPRESS: NORMAL
BH CV UPPER VENOUS RIGHT SUBCLAVIAN AUGMENT: NORMAL
BH CV UPPER VENOUS RIGHT SUBCLAVIAN COMPRESS: NORMAL
BH CV UPPER VENOUS RIGHT SUBCLAVIAN PHASIC: NORMAL
BH CV UPPER VENOUS RIGHT SUBCLAVIAN SPONT: NORMAL
BH CV UPPER VENOUS RIGHT ULNAR COMPRESS: NORMAL
BH CV VAS PRELIMINARY FINDINGS SCRIPTING: 1
BILIRUB SERPL-MCNC: 0.5 MG/DL (ref 0–1.2)
BUN SERPL-MCNC: 19 MG/DL (ref 8–23)
BUN/CREAT SERPL: 30.6 (ref 7–25)
CALCIUM SERPL-MCNC: 9.3 MG/DL (ref 8.6–10.5)
CHLORIDE SERPL-SCNC: 105 MMOL/L (ref 98–107)
CO2 SERPL-SCNC: 29.8 MMOL/L (ref 22–29)
CREAT SERPL-MCNC: 0.62 MG/DL (ref 0.57–1)
EGFRCR SERPLBLD CKD-EPI 2021: 96.5 ML/MIN/1.73
EOSINOPHIL # BLD AUTO: 0.2 10*3/MM3 (ref 0–0.4)
EOSINOPHIL NFR BLD AUTO: 3.3 % (ref 0.3–6.2)
ERYTHROCYTE [DISTWIDTH] IN BLOOD BY AUTOMATED COUNT: 13.1 % (ref 12.3–15.4)
GLOBULIN SER CALC-MCNC: 1.7 GM/DL
GLUCOSE SERPL-MCNC: 84 MG/DL (ref 65–99)
HCT VFR BLD AUTO: 40.8 % (ref 34–46.6)
HGB BLD-MCNC: 13.7 G/DL (ref 12–15.9)
IMM GRANULOCYTES # BLD AUTO: 0.03 10*3/MM3 (ref 0–0.05)
IMM GRANULOCYTES NFR BLD AUTO: 0.5 % (ref 0–0.5)
LYMPHOCYTES # BLD AUTO: 1.65 10*3/MM3 (ref 0.7–3.1)
LYMPHOCYTES NFR BLD AUTO: 27.2 % (ref 19.6–45.3)
MCH RBC QN AUTO: 30.6 PG (ref 26.6–33)
MCHC RBC AUTO-ENTMCNC: 33.6 G/DL (ref 31.5–35.7)
MCV RBC AUTO: 91.1 FL (ref 79–97)
MONOCYTES # BLD AUTO: 0.67 10*3/MM3 (ref 0.1–0.9)
MONOCYTES NFR BLD AUTO: 11 % (ref 5–12)
NEUTROPHILS # BLD AUTO: 3.51 10*3/MM3 (ref 1.7–7)
NEUTROPHILS NFR BLD AUTO: 57.8 % (ref 42.7–76)
NRBC BLD AUTO-RTO: 0 /100 WBC (ref 0–0.2)
PLATELET # BLD AUTO: 167 10*3/MM3 (ref 140–450)
POTASSIUM SERPL-SCNC: 4.5 MMOL/L (ref 3.5–5.2)
PROT SERPL-MCNC: 5.7 G/DL (ref 6–8.5)
RBC # BLD AUTO: 4.48 10*6/MM3 (ref 3.77–5.28)
SODIUM SERPL-SCNC: 143 MMOL/L (ref 136–145)
WBC # BLD AUTO: 6.07 10*3/MM3 (ref 3.4–10.8)

## 2023-11-21 PROCEDURE — 99213 OFFICE O/P EST LOW 20 MIN: CPT | Performed by: STUDENT IN AN ORGANIZED HEALTH CARE EDUCATION/TRAINING PROGRAM

## 2023-11-21 PROCEDURE — 93971 EXTREMITY STUDY: CPT

## 2023-11-21 NOTE — TELEPHONE ENCOUNTER
I called Radhabarbara Rivera at [phone] at 17:24 EST     Discussed that ultrasound looks normal.  We will try conservative management with over-the-counter allergy medicine and nasal rinses for now.  She is concerned that she needs an antibiotic but at this point I am not sure what we would be treating because symptoms are not typical for sinus infection.  Follow-up if symptoms do not improve.

## 2023-11-21 NOTE — PROGRESS NOTES
"  Isaac Guzman M.D.  Internal Medicine  [facility]  [address]  [place] [address]  [phone]      Chief Complaint  Nasal Congestion, Diarrhea (X 3 days ), and Cough (X 1 month /)    SUBJECTIVE    History of Present Illness    Radha Rivera is a 68 y.o. female with asthma and GERD who presents to the office today as an established patient of Dr Paolo Dc MD here today for an acute care visit.     Symptoms since October 31.  Started with a bad cough \"all night long\".     She went to the emergency department on November 1 for 2 days of cough.  She was prescribed albuterol and dexamethasone.  In the emergency department she had a negative strep, COVID, flu, D-dimer. X-ray in the emergency department was normal. She did feel somewhat better with treatment.     She went to urgent care on November 11 for 9 days of upper respiratory symptoms. Ears were hurting at the time. Urgent care prescribed her 10 days of doxycycline and a prednisone taper for 10 days starting at 60 mg daily but she only took 3 days.  She was prescribed promethazine-DM for cough.      Cough is getting better. She was using her nebulizer daily for 2 weeks. Just using albuterol inhaler now.    Notes arm is swollen.  Notes GI upset with diarrhea for 3 days that is improving. Feels right arm and face is swollen and red. \"Feels infected\" on right side. Hears \"crackles\" when she swallows. Right arm is sensitive. No injuries. She is not straining her arm. No history of blood clots. No chest pain. Feels pressure in her head. Antibiotic helped pressure somewhat. States she always wheezes in general.     She feels really weak. Taking Tylenol and Advil.     Feels URI symptms are resolving.     Review of Systems    Allergies   Allergen Reactions    Zostavax [Zoster Vaccine Live] Itching        Outpatient Medications Marked as Taking for the 11/21/23 encounter (Office Visit) with Isaac Guzman MD   Medication Sig " Dispense Refill    albuterol sulfate  (90 Base) MCG/ACT inhaler Inhale 2 puffs Every 4 (Four) Hours As Needed for Wheezing. 18 g 0    aspirin 81 MG EC tablet Take 1 tablet by mouth Daily. 30 tablet 3    Calcium-Phosphorus-Vitamin D 250-107-500 MG-MG-UNIT chewable tablet Chew.      Cholecalciferol (VITAMIN D) 1000 UNITS tablet Take  by mouth.      doxycycline (VIBRAMYCIN) 100 MG capsule Take 1 capsule by mouth 2 (Two) Times a Day. 20 capsule 0    montelukast (SINGULAIR) 10 MG tablet TK 1 T PO HS  11    omeprazole (priLOSEC) 40 MG capsule Take 1 capsule by mouth Daily. 90 capsule 2    PROAIR  (90 Base) MCG/ACT inhaler INL 2 PFS PO Q 4 TO 6 H PRN  11        Past Medical History:   Diagnosis Date    Asthma     Cancer     Carcinoma in situ of cervix     IN     Liver hemangioma     s/p imaging eval in past    Overweight (BMI 25.0-29.9) 10/29/2019    Pap smear abnormality of cervix with ASCUS favoring benign     2007    PMB (postmenopausal bleeding)      Past Surgical History:   Procedure Laterality Date    CATARACT EXTRACTION, BILATERAL Bilateral 2019     SECTION      CHOLECYSTECTOMY  2016    COLONOSCOPY N/A 2016    Procedure: COLONOSCOPY TO CECUM;  Surgeon: Aisha Partida MD;  Location: [facility] ENDOSCOPY;  Service:     DILATATION AND CURETTAGE      DILATION AND CURETTAGE, DIAGNOSTIC / THERAPEUTIC      endometrial hyperplasia    TONSILLECTOMY       Family History   Problem Relation Age of Onset    Hypertension Mother     Thyroid disease Mother     Osteoporosis Mother     Alcohol abuse Father     Cancer Maternal Grandmother         breast cancer    Breast cancer Maternal Grandmother     Cancer Cousin         breast cancer - two cousins    Breast cancer Cousin     No Known Problems Son     No Known Problems Daughter     reports that she has never smoked. She has never been exposed to tobacco smoke. She has never used smokeless tobacco. She reports current alcohol use. She reports that  "she does not use drugs.    OBJECTIVE    Vital Signs:   /70   Pulse 82   Temp 98.2 °F (36.8 °C)   Ht 170.2 cm (67.01\")   Wt 80.4 kg (177 lb 3.2 oz)   SpO2 97%   BMI 27.75 kg/m²     Physical Exam  Constitutional:       Appearance: Normal appearance. She is normal weight.   HENT:      Right Ear: Tympanic membrane normal.      Left Ear: Tympanic membrane normal.      Nose:      Right Sinus: No maxillary sinus tenderness or frontal sinus tenderness.      Left Sinus: No maxillary sinus tenderness or frontal sinus tenderness.      Comments: Maybe mild fullness of subcutaneous tissue on right jaw compared to left. No tenderness. No mass.      Mouth/Throat:      Mouth: Mucous membranes are moist.      Dentition: No dental tenderness.      Pharynx: Oropharynx is clear. No oropharyngeal exudate or posterior oropharyngeal erythema.      Comments: Gums receding  Cardiovascular:      Rate and Rhythm: Normal rate and regular rhythm.      Heart sounds: Normal heart sounds. No murmur heard.  Pulmonary:      Effort: Pulmonary effort is normal.      Breath sounds: Normal breath sounds.   Abdominal:      General: Abdomen is flat. There is no distension.      Palpations: Abdomen is soft.      Tenderness: There is no abdominal tenderness.   Musculoskeletal:      Right lower leg: No edema.      Left lower leg: No edema.      Comments: Fullness in right forearm. Not tender. Normal range of motion. No edema.    Lymphadenopathy:      Cervical:      Right cervical: No superficial, deep or posterior cervical adenopathy.     Left cervical: No superficial, deep or posterior cervical adenopathy.   Skin:     General: Skin is warm and dry.   Neurological:      Mental Status: She is alert.   Psychiatric:         Mood and Affect: Mood normal.         Behavior: Behavior normal.         Thought Content: Thought content normal.            The following data was reviewed by: Isaac Guzman MD on 11/21/2023:  [facility]          6/9/2023    11:40 " 6/9/2023    12:07 6/10/2023    07:52 11/1/2023    05:22   CMP   Glucose 103   99  126    BUN 20   9  15    Creatinine 0.61  0.48  0.48  0.65    EGFR 96.9  102.7  102.7  95.4    Sodium 139   142  137    Potassium 4.1   4.4  4.0    Chloride 104   109  100    Calcium 9.4   8.9  9.6    Total Protein 7.2   6.2  7.1    Albumin 4.4   3.8  4.4    Globulin 2.8   2.4  2.7    Total Bilirubin 0.4   0.4  0.6    Alkaline Phosphatase 93   72  90    AST (SGOT) 21   19  16    ALT (SGPT) 20   21  15    Albumin/Globulin Ratio 1.6   1.6  1.6    BUN/Creatinine Ratio 32.8   18.8  23.1    Anion Gap 10.0   4.9  11.0      CBC w/diff          6/9/2023    11:40 6/10/2023    07:52 11/1/2023    05:22   CBC w/Diff   WBC 5.73  4.92  11.29    RBC 4.64  4.40  4.64    Hemoglobin 13.8  13.0  13.7    Hematocrit 42.9  38.3  43.0    MCV 92.5  87.0  92.7    MCH 29.7  29.5  29.5    MCHC 32.2  33.9  31.9    RDW 13.5  12.4  13.2    Platelets 214  191  184    Neutrophil Rel % 52.5  60.8  83.5    Immature Granulocyte Rel % 0.3  0.2  0.2    Lymphocyte Rel % 36.1  28.5  9.7    Monocyte Rel % 9.2  7.5  5.9    Eosinophil Rel % 1.7  2.6  0.5    Basophil Rel % 0.2  0.4  0.2      Lipid Panel          5/18/2023    08:57 6/10/2023    07:52   Lipid Panel   Total Cholesterol  187    Triglycerides  115    HDL Cholesterol 41  49    VLDL Cholesterol  21    LDL Cholesterol  115  117    LDL/HDL Ratio  2.35      TSH          5/18/2023    08:57 6/10/2023    07:52   TSH   TSH 3.070  4.250      A1C Last 3 Results          5/18/2023    08:57   HGBA1C Last 3 Results   Hemoglobin A1C 5.30      Data reviewed : ER and UC notes              ASSESSMENT & PLAN     Diagnoses and all orders for this visit:    1. Right arm pain (Primary)  -     Duplex Venous Upper Extremity - Right CAR; Future    2. Mild persistent asthma without complication    3. Weakness  -     CBC & Differential  -     Comprehensive Metabolic Panel    4. Diarrhea, unspecified type       68 y.o. female with asthma and  GERD who presents to the office today for right forearm pain, right facial fullness and cough for 1 month. Overall cough has gotten better with antibiotic and steroid course. Will focus on treating nasal congestion of facial fullness with OTC Claritin, Flonase, nasal rinses and Mucinex. For arm pain will check U/S to rule out DVT. Superficial thrombophlebitis and muscle strain also on differential for this. WBC elevated in ED and we will recheck today. Suspect diarrhea is related to antibiotic use. This is improving. Patient counseled to seek medical care for new or worsening symptoms or failure of symptoms to improve.       Health Maintenance Due   Topic Date Due    INFLUENZA VACCINE  08/01/2023    COVID-19 Vaccine (3 - 2023-24 season) 09/01/2023    ANNUAL WELLNESS VISIT  11/17/2023        Follow Up  No follow-ups on file.    Patient/family had no further questions at this time and verbalized understanding of the plan discussed today.

## 2023-11-22 ENCOUNTER — TELEPHONE (OUTPATIENT)
Dept: INTERNAL MEDICINE | Facility: CLINIC | Age: 68
End: 2023-11-22
Payer: MEDICARE

## 2023-11-22 NOTE — TELEPHONE ENCOUNTER
I called Radha Rivera at [phone] at 16:54 EST.  There is no answer so I left voicemail.    Lab results are essentially normal.  Her white blood cell count has returned to normal.  Her liver transaminases are minimally elevated.  Recommend to avoid excessive Tylenol and alcohol use.

## 2023-12-14 DIAGNOSIS — E66.3 OVERWEIGHT (BMI 25.0-29.9): ICD-10-CM

## 2023-12-14 DIAGNOSIS — J30.89 ENVIRONMENTAL AND SEASONAL ALLERGIES: ICD-10-CM

## 2023-12-14 DIAGNOSIS — K21.9 GASTROESOPHAGEAL REFLUX DISEASE, UNSPECIFIED WHETHER ESOPHAGITIS PRESENT: ICD-10-CM

## 2023-12-14 DIAGNOSIS — I77.3 FIBROMUSCULAR DYSPLASIA: Primary | ICD-10-CM

## 2023-12-21 ENCOUNTER — OFFICE VISIT (OUTPATIENT)
Dept: INTERNAL MEDICINE | Facility: CLINIC | Age: 68
End: 2023-12-21
Payer: MEDICARE

## 2023-12-21 VITALS
SYSTOLIC BLOOD PRESSURE: 122 MMHG | WEIGHT: 178 LBS | DIASTOLIC BLOOD PRESSURE: 76 MMHG | BODY MASS INDEX: 27.94 KG/M2 | HEART RATE: 99 BPM | OXYGEN SATURATION: 95 % | HEIGHT: 67 IN

## 2023-12-21 DIAGNOSIS — M85.852 OSTEOPENIA OF BOTH HIPS: ICD-10-CM

## 2023-12-21 DIAGNOSIS — Z87.09 HISTORY OF BRONCHITIS: ICD-10-CM

## 2023-12-21 DIAGNOSIS — E66.3 OVERWEIGHT (BMI 25.0-29.9): ICD-10-CM

## 2023-12-21 DIAGNOSIS — Z00.01 ENCOUNTER FOR GENERAL ADULT MEDICAL EXAMINATION WITH ABNORMAL FINDINGS: Primary | ICD-10-CM

## 2023-12-21 DIAGNOSIS — M85.851 OSTEOPENIA OF BOTH HIPS: ICD-10-CM

## 2023-12-21 DIAGNOSIS — Z86.69 HISTORY OF MIGRAINE: ICD-10-CM

## 2023-12-21 DIAGNOSIS — M70.61 TROCHANTERIC BURSITIS OF RIGHT HIP: ICD-10-CM

## 2023-12-21 DIAGNOSIS — K21.9 GASTROESOPHAGEAL REFLUX DISEASE, UNSPECIFIED WHETHER ESOPHAGITIS PRESENT: ICD-10-CM

## 2023-12-21 DIAGNOSIS — J45.30 MILD PERSISTENT ASTHMA WITHOUT COMPLICATION: ICD-10-CM

## 2023-12-21 DIAGNOSIS — J30.89 ENVIRONMENTAL AND SEASONAL ALLERGIES: ICD-10-CM

## 2023-12-21 DIAGNOSIS — Z23 NEEDS FLU SHOT: ICD-10-CM

## 2023-12-21 DIAGNOSIS — I77.3 FIBROMUSCULAR DYSPLASIA: ICD-10-CM

## 2023-12-21 NOTE — Clinical Note
Will you look at patient's insurance.  She seems convinced that she needs to change doctors, but I believe her plan is through teachers correction.  Can you clarify and contact her if she and her  are able to remain with me in the practice?

## 2023-12-21 NOTE — PATIENT INSTRUCTIONS
Medicare Wellness  Personal Prevention Plan of Service     Date of Office Visit:    Encounter Provider:  Paolo Dc MD  Place of Service:  [facility]  Patient Name: Radha Rivera  :  1955    As part of the Medicare Wellness portion of your visit today, we are providing you with this personalized preventive plan of services (PPPS). This plan is based upon recommendations of the [place] Preventive Services Task Force (USPSTF) and the Advisory Committee on Immunization Practices (ACIP).    This lists the preventive care services that should be considered, and provides dates of when you are due. Items listed as completed are up-to-date and do not require any further intervention.    Health Maintenance   Topic Date Due    COVID-19 Vaccine (3 - 2023- season) 2023    MAMMOGRAM  2024    BMI FOLLOWUP  06/10/2024    ANNUAL WELLNESS VISIT  2024    DXA SCAN  2025    PAP SMEAR  10/23/2026    COLORECTAL CANCER SCREENING  2026    TDAP/TD VACCINES (3 - Td or Tdap) 2030    HEPATITIS C SCREENING  Completed    INFLUENZA VACCINE  Completed    Pneumococcal Vaccine 65+  Completed       Orders Placed This Encounter   Procedures    Fluzone High-Dose 65+yrs       Return in about 1 year (around 2024) for Medicare Wellness, Annual physical.

## 2023-12-21 NOTE — PROGRESS NOTES
Subjective   Radha Rivera is a 68 y.o. female who presents for a Medicare Well Visit    Patient Care Team:  Paolo Dc MD as PCP - General (Internal Medicine)    Recent Hospitalizations: Recently treated at the following:  [facility]    Depression Screen:       2023     4:09 PM   PHQ-2/PHQ-9 Depression Screening   Little Interest or Pleasure in Doing Things 0-->not at all   Feeling Down, Depressed or Hopeless 0-->not at all   PHQ-9: Brief Depression Severity Measure Score 0       Functional and Cognitive Screenin/21/2023     4:08 PM   Functional & Cognitive Status   Do you have difficulty preparing food and eating? No   Do you have difficulty bathing yourself, getting dressed or grooming yourself? No   Do you have difficulty using the toilet? No   Do you have difficulty moving around from place to place? No   Do you have trouble with steps or getting out of a bed or a chair? No   Current Diet Well Balanced Diet   Dental Exam Not up to date   Eye Exam Up to date   Exercise (times per week) 2 times per week   Current Exercises Include Walking   Do you need help using the phone?  No   Are you deaf or do you have serious difficulty hearing?  No   Do you need help to go to places out of walking distance? No   Do you need help shopping? No   Do you need help preparing meals?  No   Do you need help with housework?  No   Do you need help with laundry? No   Do you need help taking your medications? No   Do you need help managing money? No   Do you ever drive or ride in a car without wearing a seat belt? No   Have you felt unusual stress, anger or loneliness in the last month? No   Who do you live with? Spouse   If you need help, do you have trouble finding someone available to you? No   Do you have difficulty concentrating, remembering or making decisions? No       Does the patient have evidence of cognitive impairment? no    No opioid medication identified on active medication list. I  "have reviewed chart for other potential  high risk medication/s and harmful drug interactions in the elderly.          Does not need ASA (and currently is not on it)    Vitals:    12/21/23 1605   BP: 122/76   Pulse: 99   SpO2: 95%   Weight: 80.7 kg (178 lb)   Height: 170.2 cm (67.01\")       Body mass index is 27.87 kg/m².    Visual Acuity:  No results found.    Advanced Care Planning:  ACP discussion was held with the patient during this visit. Patient has an advance directive (not in EMR), copy requested.    Compared to one year ago, the patient feels physical health is worse.  Compared to one year ago, the patient feels mental health is the same.    Reviewed chart for potential of high risk medication in the elderly: yes  Reviewed chart for potential of harmful drug interactions in the elderly:yes    Identification of Risk Factors:  Risk factors include: Advance Directive Discussion  Breast Cancer/Mammogram Screening  Colon Cancer Screening  Diabetic Lab Screening   Glaucoma Risk  Immunizations Discussed/Encouraged (specific immunizations; Tdap, Hepatitis A Vaccine/Series, Influenza, Pneumococcal 23, Shingrix, COVID19, and RSV (Respiratory Syncytial Virus) )  Obesity/Overweight .    Patient Self-Management and Personalized Health Advice  The patient has been provided with information about: diet, exercise, and weight management and preventive services including:   Annual Wellness Visit (AWV)  Bone Density Measurements  Colorectal Cancer Screening, Colonoscopy  Influenza Vaccine and Administration  Screening Mammography .  Follow Up: Medicare visit in one year    Discussed the patient's BMI with her. The BMI is above average; BMI management plan is completed.    Patient has multiple medical problems which are significant and separately identifiable that require additional work above and beyond the Medicare Wellness Visit. These are not trivial or insignificant and are billed with a 25-modifier    Chief Complaint " "  Patient presents with    Medicare Wellness-subsequent     Review of medical issues       HPI:  Radha Rivera is a 68 y.o. female RTC in yearly CPE/ AWV, review of medical issues: \"I have been sick, I have been sick since October\".  Started with illness URI on Halloween and told was (-) COVID and flu but 'thought I had COVID'.  Not given meds other than steroids.  \"I felt horrible\". Saw [facility] a week later and told had URI and given Abx and cough meds. Still had issues and had then onset of R facial and R arm severe pain with persistent fatigue. Did not end up taking any additional meds.  Arm is back to normal and feels like near to normal.  Still has some fatigue, but minimal.  Head congestion is better, \"I can hear again\".   Felt like element of asthma with tightness, wheezing, and SOA during event.     1. BPPV, R - did see PT and 'it was wonderful'. No recurrence.   2.  GERD/ gastritis - acceleration in 2022 of abdominal discomfort and heartburn noted every since lap maulik in 2011 -  Resolved on PPI daily. NO issues swallowing.   3. Asthma and Seasonal Allergies - long hx, adult onset only. S/P immunotherapy weekly ~ 1 year, self D/C as felt like not helpful.  On Singulair daily and Xyzal in season.    4.  Hx of Hypothyroidism, though (-) anti-TPO Ab - off levothyroxine for > 3  Years now.  TSH remained normal.  Trend TSH next labs.   5. Hx of Carcinoma in situ of cervix - in 1977, s/p D & C, no recurrence. UTD Dr. Pablo ~10/2023 for annual Pap, was OK.    6. Overweight, decline in HDL, IFG -change diet over the year recognizing very little protein in the diet.  Notes was \"doing pretty well\" until had illness in October bedside monitor progress.    7. Hx of R hip trochanteric bursitis/ gluteal tendonitis - noted in 2021 with prolonged sitting. Largely resolved. Recurred over year and still bothers.  \"Will really hobble if bad\".  Can hurt to lay on that side at night. Has occasional back pain only, not sure if " related.         Review of Systems   Constitutional: Negative for chills and fever.   HENT:  Negative for congestion, hearing loss, hoarse voice, odynophagia and sore throat.    Eyes:  Negative for discharge, double vision, pain and redness.        Last eye exam 2023, sees annually     Cardiovascular:  Negative for chest pain, dyspnea on exertion, irregular heartbeat, leg swelling, near-syncope, palpitations and syncope.   Respiratory:  Negative for cough and shortness of breath.    Endocrine: Negative for polydipsia, polyphagia and polyuria.   Hematologic/Lymphatic: Negative for bleeding problem. Does not bruise/bleed easily.   Skin:  Negative for rash and suspicious lesions.   Musculoskeletal:  Positive for joint pain (R hip pain, 'comes and goes'). Negative for joint swelling, muscle cramps, muscle weakness and myalgias.   Gastrointestinal:  Negative for constipation, diarrhea, dysphagia, heartburn, nausea and vomiting.   Genitourinary:  Negative for bladder incontinence, dysuria, frequency, hematuria, hesitancy and incomplete emptying.   Neurological:  Negative for dizziness, headaches and light-headedness.   Psychiatric/Behavioral:  Negative for depression. The patient is not nervous/anxious.          @OBJECTIVE BEGIN@  Vitals:    12/21/23 1605   BP: 122/76   Pulse: 99   SpO2: 95%     Physical Exam  Vitals reviewed.   Constitutional:       General: She is not in acute distress.     Appearance: Normal appearance. She is well-developed. She is not ill-appearing or toxic-appearing.   HENT:      Head: Normocephalic and atraumatic.      Right Ear: Hearing, tympanic membrane, ear canal and external ear normal. There is no impacted cerumen.      Left Ear: Hearing, tympanic membrane, ear canal and external ear normal. There is no impacted cerumen.      Nose: Nose normal.      Mouth/Throat:      Mouth: Mucous membranes are moist. No injury or oral lesions.      Dentition: Does not have dentures.      Tongue: No lesions.       Pharynx: Oropharynx is clear. Uvula midline. No pharyngeal swelling, oropharyngeal exudate, posterior oropharyngeal erythema or uvula swelling.   Eyes:      General: Lids are normal. No scleral icterus.        Right eye: No discharge.         Left eye: No discharge.      Extraocular Movements: Extraocular movements intact.      Conjunctiva/sclera: Conjunctivae normal.      Pupils: Pupils are equal, round, and reactive to light.   Neck:      Thyroid: No thyroid mass or thyromegaly.      Vascular: No carotid bruit.      Trachea: Trachea normal.   Cardiovascular:      Rate and Rhythm: Normal rate and regular rhythm.      Pulses:           Radial pulses are 2+ on the right side and 2+ on the left side.        Dorsalis pedis pulses are 2+ on the right side and 2+ on the left side.        Posterior tibial pulses are 2+ on the right side and 2+ on the left side.      Heart sounds: Normal heart sounds, S1 normal and S2 normal. No murmur heard.     No friction rub. No gallop.   Pulmonary:      Effort: Pulmonary effort is normal. No respiratory distress.      Breath sounds: Normal breath sounds. No wheezing, rhonchi or rales.   Abdominal:      General: Bowel sounds are normal. There is no distension.      Palpations: Abdomen is soft. There is no mass.      Tenderness: There is no abdominal tenderness. There is no guarding or rebound.   Musculoskeletal:         General: Normal range of motion.      Right shoulder: No tenderness, bony tenderness or crepitus. Normal range of motion.      Left shoulder: No tenderness, bony tenderness or crepitus. Normal range of motion.      Right hand: No tenderness or bony tenderness. Normal range of motion. Normal strength.      Left hand: No tenderness or bony tenderness. Normal range of motion. Normal strength.      Cervical back: Full passive range of motion without pain. No rigidity. No muscular tenderness. Normal range of motion.      Right lower leg: No edema.      Left lower leg:  No edema.      Right foot: Normal range of motion. No deformity or bunion.      Left foot: Normal range of motion. No deformity or bunion.   Feet:      Right foot:      Skin integrity: No ulcer, blister or skin breakdown.      Left foot:      Skin integrity: No ulcer, blister or skin breakdown.   Lymphadenopathy:      Cervical: No cervical adenopathy.      Upper Body:      Right upper body: No supraclavicular, axillary or pectoral adenopathy.      Left upper body: No supraclavicular, axillary or pectoral adenopathy.      Lower Body: No right inguinal adenopathy. No left inguinal adenopathy.   Skin:     General: Skin is warm and dry.      Findings: No rash.   Neurological:      Mental Status: She is alert and oriented to person, place, and time.      Cranial Nerves: No cranial nerve deficit.      Sensory: No sensory deficit.      Motor: No weakness, tremor, atrophy or abnormal muscle tone.      Gait: Gait normal.      Deep Tendon Reflexes: Reflexes are normal and symmetric.      Reflex Scores:       Patellar reflexes are 2+ on the right side and 2+ on the left side.       Achilles reflexes are 2+ on the right side and 2+ on the left side.  Psychiatric:         Mood and Affect: Mood normal.         Behavior: Behavior normal.         Thought Content: Thought content normal.             Assessment & Plan   Diagnoses and all orders for this visit:    1. Encounter for general adult medical examination with abnormal findings (Primary)    2. Mild persistent asthma without complication    3. Needs flu shot  -     Fluzone High-Dose 65+yrs    4. Environmental and seasonal allergies    5. Fibromuscular dysplasia    6. Gastroesophageal reflux disease, unspecified whether esophagitis present    7. History of migraine    8. Overweight (BMI 25.0-29.9)    9. Trochanteric bursitis of right hip    10. History of bronchitis    11. Osteopenia of both hips            Diagnoses and all orders for this visit:    Encounter for general adult  medical examination with abnormal findings    Mild persistent asthma without complication    Needs flu shot  -     Fluzone High-Dose 65+yrs    Environmental and seasonal allergies    Fibromuscular dysplasia    Gastroesophageal reflux disease, unspecified whether esophagitis present    History of migraine    Overweight (BMI 25.0-29.9)    Trochanteric bursitis of right hip    History of bronchitis    Osteopenia of both hips        Radha Rivera is a 68 y.o. female RTC in yearly CPE/ AWV, review of medical issues:   1. Hx of admission 6/9-6/10/23 for vague HA, arm weakness, word finding issue, and 'not feeling right'.  Stroke eval negative with CTA head/ neck and MRI brain notable only for V3 segment mild beading appearance (?FMD isolated) and mild small vessel disease. Sx resolved inpt. -No recurrence of symptoms.   ??S migrainous event based on hx of sx associated with HA and stress with self directed home remodeling.     2. BPPV, R; secondary issue after admission in 6/2023- resolved with PT, no recurrence  3.  GERD/ gastritis - acceleration in 2022 of abdominal discomfort and heartburn noted every since lap maulik in 2011; EGD 9/2022 with mild gastritis only -  Resolved on PPI daily.  No red flag symptoms.  Weight loss advised.   4. Asthma and Seasonal Allergies; long hx, adult onset only. S/P immunotherapy weekly ~ 1 year, self D/C as felt like not helpful.  On Singulair daily and Xyzal in season.  - ESPERANZA today.  Consider Flonase daily OTC, Astepro daily OTC, nasal rinse daily, and Alaway eye drops as add on during allergy season.    --> Recurrent bronchitis -recall history of at least annual bronchitis, often seen at [facility] with ABX and steroid dosing.  Issue addressed last year at CPE.  Patient with severe bronchitis event in October that has lingered and only now resolving.  Seen in ED with clear CXR and empiric steroid treatment, [facility] with antibiotic treatment, and follow-up with partner MD in office with associated  severe right facial and right arm pain.  Exam benign and nonfocal today, but patient upset with severity and duration of illness.  D/W patient would like to have seen a viral panel at the time of illness for clarification, but also wonder about pertussis based on her history.  Declines IgM testing for pertussis at this time.  Call or RTC for any recurrence at this point.    5.  Hx of Hypothyroidism, though (-) anti-TPO Ab - off levothyroxine for > 3  years now.  TSH remains normal.  6. Hx of Carcinoma in situ of cervix - in 1977, s/p D & C, no recurrence. UTD Dr. Pablo ~10/2023 for annual Pap.   7. Overweight, Hx IFG -improved diet over year with increase in protein and some weight loss achieved, though sidelined with recent illness in #4.  IFG resolved on labs today, HDL acceptable.  C/W TLC diet efforts and exercise.  Trend.    8. Hx of R hip trochanteric bursitis/ gluteal tendonitis - noted in 2021 with prolonged sitting. Largely resolved, but recurred variably over a year.  Plans to see Ortho for evaluation.    9.  Osteopenia, B hips-new Dx 4/2023 DEXA.  Mild osteopenia.  Did not recommend treatment and patient to C/W exercise.  Trend DEXA 4/2025.  C/W calcium plus D daily.  10. HM - labs d/w pt; Flu - today;  Prevnar/ Zostavax/ Tdap/ Shingrix/ Pvax/ COVID - UTD; COVID update declines; RSV vacccine reviewed with pt, d/w pt rationale and risk vs. benefit, and defers for now; C-scope (-) 11/2016 --> 10 years; Pap UTD 10/2023 --> annually with hx carcinoma in situ; Mammo (-) 2/2023;  DEXA due for 2025; Hep C Ab (-) 9/2016; add back exercise; Preventative care plan provided to pt at end of visit    Return in about 1 year (around 12/21/2024) for Medicare Wellness, Annual physical.          Current Outpatient Medications:     aspirin 81 MG EC tablet, Take 1 tablet by mouth Daily., Disp: 30 tablet, Rfl: 3    Calcium-Phosphorus-Vitamin D 250-107-500 MG-MG-UNIT chewable tablet, Chew., Disp: , Rfl:     Cholecalciferol  (VITAMIN D) 1000 UNITS tablet, Take  by mouth., Disp: , Rfl:     montelukast (SINGULAIR) 10 MG tablet, TK 1 T PO HS, Disp: , Rfl: 11    omeprazole (priLOSEC) 40 MG capsule, Take 1 capsule by mouth Daily., Disp: 90 capsule, Rfl: 2    albuterol sulfate  (90 Base) MCG/ACT inhaler, Inhale 2 puffs Every 4 (Four) Hours As Needed for Wheezing., Disp: 18 g, Rfl: 0

## 2023-12-22 LAB
ALBUMIN SERPL-MCNC: 4.5 G/DL (ref 3.5–5.2)
ALBUMIN/GLOB SERPL: 2 G/DL
ALP SERPL-CCNC: 91 U/L (ref 39–117)
ALT SERPL-CCNC: 20 U/L (ref 1–33)
APPEARANCE UR: CLEAR
AST SERPL-CCNC: 21 U/L (ref 1–32)
BACTERIA #/AREA URNS HPF: ABNORMAL /HPF
BACTERIA UR CULT: ABNORMAL
BASOPHILS # BLD AUTO: 0.03 10*3/MM3 (ref 0–0.2)
BASOPHILS NFR BLD AUTO: 0.5 % (ref 0–1.5)
BILIRUB SERPL-MCNC: 0.6 MG/DL (ref 0–1.2)
BILIRUB UR QL STRIP: NEGATIVE
BUN SERPL-MCNC: 13 MG/DL (ref 8–23)
BUN/CREAT SERPL: 20 (ref 7–25)
CALCIUM SERPL-MCNC: 9.3 MG/DL (ref 8.6–10.5)
CASTS URNS QL MICRO: ABNORMAL /LPF
CHLORIDE SERPL-SCNC: 101 MMOL/L (ref 98–107)
CHOLEST SERPL-MCNC: 217 MG/DL (ref 0–200)
CO2 SERPL-SCNC: 28.3 MMOL/L (ref 22–29)
COLOR UR: YELLOW
CREAT SERPL-MCNC: 0.65 MG/DL (ref 0.57–1)
EGFRCR SERPLBLD CKD-EPI 2021: 95.4 ML/MIN/1.73
EOSINOPHIL # BLD AUTO: 0.13 10*3/MM3 (ref 0–0.4)
EOSINOPHIL NFR BLD AUTO: 2.4 % (ref 0.3–6.2)
EPI CELLS #/AREA URNS HPF: ABNORMAL /HPF (ref 0–10)
ERYTHROCYTE [DISTWIDTH] IN BLOOD BY AUTOMATED COUNT: 12.6 % (ref 12.3–15.4)
GLOBULIN SER CALC-MCNC: 2.3 GM/DL
GLUCOSE SERPL-MCNC: 93 MG/DL (ref 65–99)
GLUCOSE UR QL STRIP: NEGATIVE
HCT VFR BLD AUTO: 40.7 % (ref 34–46.6)
HDLC SERPL-MCNC: 55 MG/DL (ref 40–60)
HGB BLD-MCNC: 13.4 G/DL (ref 12–15.9)
HGB UR QL STRIP: NEGATIVE
IMM GRANULOCYTES # BLD AUTO: 0.01 10*3/MM3 (ref 0–0.05)
IMM GRANULOCYTES NFR BLD AUTO: 0.2 % (ref 0–0.5)
KETONES UR QL STRIP: NEGATIVE
LDLC SERPL CALC-MCNC: 140 MG/DL (ref 0–100)
LEUKOCYTE ESTERASE UR QL STRIP: ABNORMAL
LYMPHOCYTES # BLD AUTO: 1.82 10*3/MM3 (ref 0.7–3.1)
LYMPHOCYTES NFR BLD AUTO: 33 % (ref 19.6–45.3)
MCH RBC QN AUTO: 29.8 PG (ref 26.6–33)
MCHC RBC AUTO-ENTMCNC: 32.9 G/DL (ref 31.5–35.7)
MCV RBC AUTO: 90.4 FL (ref 79–97)
MICRO URNS: ABNORMAL
MONOCYTES # BLD AUTO: 0.56 10*3/MM3 (ref 0.1–0.9)
MONOCYTES NFR BLD AUTO: 10.2 % (ref 5–12)
NEUTROPHILS # BLD AUTO: 2.96 10*3/MM3 (ref 1.7–7)
NEUTROPHILS NFR BLD AUTO: 53.7 % (ref 42.7–76)
NITRITE UR QL STRIP: NEGATIVE
NRBC BLD AUTO-RTO: 0 /100 WBC (ref 0–0.2)
OTHER ANTIBIOTIC SUSC ISLT: ABNORMAL
PH UR STRIP: 6.5 [PH] (ref 5–7.5)
PLATELET # BLD AUTO: 220 10*3/MM3 (ref 140–450)
POTASSIUM SERPL-SCNC: 4.2 MMOL/L (ref 3.5–5.2)
PROT SERPL-MCNC: 6.8 G/DL (ref 6–8.5)
PROT UR QL STRIP: ABNORMAL
RBC # BLD AUTO: 4.5 10*6/MM3 (ref 3.77–5.28)
RBC #/AREA URNS HPF: ABNORMAL /HPF (ref 0–2)
SODIUM SERPL-SCNC: 139 MMOL/L (ref 136–145)
SP GR UR STRIP: 1.02 (ref 1–1.03)
TRIGL SERPL-MCNC: 125 MG/DL (ref 0–150)
TSH SERPL DL<=0.005 MIU/L-ACNC: 3.58 UIU/ML (ref 0.27–4.2)
URINALYSIS REFLEX: ABNORMAL
UROBILINOGEN UR STRIP-MCNC: 1 MG/DL (ref 0.2–1)
VLDLC SERPL CALC-MCNC: 22 MG/DL (ref 5–40)
WBC # BLD AUTO: 5.51 10*3/MM3 (ref 3.4–10.8)
WBC #/AREA URNS HPF: ABNORMAL /HPF (ref 0–5)

## 2024-02-26 ENCOUNTER — TRANSCRIBE ORDERS (OUTPATIENT)
Dept: ADMINISTRATIVE | Facility: HOSPITAL | Age: 69
End: 2024-02-26
Payer: MEDICARE

## 2024-02-26 DIAGNOSIS — Z12.31 ENCOUNTER FOR SCREENING MAMMOGRAM FOR MALIGNANT NEOPLASM OF BREAST: Primary | ICD-10-CM

## 2024-03-06 ENCOUNTER — HOSPITAL ENCOUNTER (OUTPATIENT)
Dept: MAMMOGRAPHY | Facility: HOSPITAL | Age: 69
Discharge: HOME | End: 2024-03-06
Admitting: INTERNAL MEDICINE
Payer: MEDICARE

## 2024-03-06 DIAGNOSIS — Z12.31 ENCOUNTER FOR SCREENING MAMMOGRAM FOR MALIGNANT NEOPLASM OF BREAST: ICD-10-CM

## 2024-03-06 PROCEDURE — 77063 BREAST TOMOSYNTHESIS BI: CPT

## 2024-03-06 PROCEDURE — 77067 SCR MAMMO BI INCL CAD: CPT

## 2024-03-25 ENCOUNTER — PRE-ADMISSION TESTING (OUTPATIENT)
Dept: PREADMISSION TESTING | Facility: HOSPITAL | Age: 69
End: 2024-03-25
Payer: MEDICARE

## 2024-03-25 ENCOUNTER — TELEPHONE (OUTPATIENT)
Dept: INTERNAL MEDICINE | Facility: CLINIC | Age: 69
End: 2024-03-25

## 2024-03-25 VITALS
DIASTOLIC BLOOD PRESSURE: 75 MMHG | BODY MASS INDEX: 26.93 KG/M2 | OXYGEN SATURATION: 95 % | HEART RATE: 106 BPM | HEIGHT: 68 IN | SYSTOLIC BLOOD PRESSURE: 127 MMHG | TEMPERATURE: 97.9 F | WEIGHT: 177.7 LBS | RESPIRATION RATE: 18 BRPM

## 2024-03-25 LAB
ALBUMIN SERPL-MCNC: 4.1 G/DL (ref 3.5–5.2)
ALBUMIN/GLOB SERPL: 1.6 G/DL
ALP SERPL-CCNC: 80 U/L (ref 39–117)
ALT SERPL W P-5'-P-CCNC: 17 U/L (ref 1–33)
ANION GAP SERPL CALCULATED.3IONS-SCNC: 9.4 MMOL/L (ref 5–15)
AST SERPL-CCNC: 21 U/L (ref 1–32)
BILIRUB SERPL-MCNC: 0.3 MG/DL (ref 0–1.2)
BUN SERPL-MCNC: 19 MG/DL (ref 8–23)
BUN/CREAT SERPL: 32.8 (ref 7–25)
CALCIUM SPEC-SCNC: 9.3 MG/DL (ref 8.6–10.5)
CHLORIDE SERPL-SCNC: 106 MMOL/L (ref 98–107)
CO2 SERPL-SCNC: 27.6 MMOL/L (ref 22–29)
CREAT SERPL-MCNC: 0.58 MG/DL (ref 0.57–1)
DEPRECATED RDW RBC AUTO: 41.6 FL (ref 37–54)
EGFRCR SERPLBLD CKD-EPI 2021: 97.5 ML/MIN/1.73
ERYTHROCYTE [DISTWIDTH] IN BLOOD BY AUTOMATED COUNT: 12.3 % (ref 12.3–15.4)
GLOBULIN UR ELPH-MCNC: 2.5 GM/DL
GLUCOSE SERPL-MCNC: 100 MG/DL (ref 65–99)
HCT VFR BLD AUTO: 42.8 % (ref 34–46.6)
HGB BLD-MCNC: 13.9 G/DL (ref 12–15.9)
MCH RBC QN AUTO: 29.3 PG (ref 26.6–33)
MCHC RBC AUTO-ENTMCNC: 32.5 G/DL (ref 31.5–35.7)
MCV RBC AUTO: 90.3 FL (ref 79–97)
PLATELET # BLD AUTO: 169 10*3/MM3 (ref 140–450)
PMV BLD AUTO: 10.1 FL (ref 6–12)
POTASSIUM SERPL-SCNC: 4.4 MMOL/L (ref 3.5–5.2)
PROT SERPL-MCNC: 6.6 G/DL (ref 6–8.5)
RBC # BLD AUTO: 4.74 10*6/MM3 (ref 3.77–5.28)
SODIUM SERPL-SCNC: 143 MMOL/L (ref 136–145)
WBC NRBC COR # BLD AUTO: 4.46 10*3/MM3 (ref 3.4–10.8)

## 2024-03-25 PROCEDURE — 36415 COLL VENOUS BLD VENIPUNCTURE: CPT

## 2024-03-25 PROCEDURE — 80053 COMPREHEN METABOLIC PANEL: CPT

## 2024-03-25 PROCEDURE — 93005 ELECTROCARDIOGRAM TRACING: CPT

## 2024-03-25 PROCEDURE — 85027 COMPLETE CBC AUTOMATED: CPT

## 2024-03-25 PROCEDURE — 93010 ELECTROCARDIOGRAM REPORT: CPT | Performed by: INTERNAL MEDICINE

## 2024-03-25 RX ORDER — MONTELUKAST SODIUM 10 MG/1
10 TABLET ORAL NIGHTLY
Qty: 90 TABLET | Refills: 3 | Status: SHIPPED | OUTPATIENT
Start: 2024-03-25

## 2024-03-25 RX ORDER — ALBUTEROL SULFATE 90 UG/1
2 AEROSOL, METERED RESPIRATORY (INHALATION) EVERY 4 HOURS PRN
Qty: 18 G | Refills: 0 | Status: SHIPPED | OUTPATIENT
Start: 2024-03-25

## 2024-03-25 NOTE — TELEPHONE ENCOUNTER
"Caller: Radha Rivera \"Charleen Rivera\"    Relationship: Self    Best call back number: [phone]    Requested Prescriptions:   Requested Prescriptions     Pending Prescriptions Disp Refills    montelukast (SINGULAIR) 10 MG tablet  11     Sig: Take 1 tablet by mouth As Needed.    albuterol sulfate  (90 Base) MCG/ACT inhaler 18 g 0     Sig: Inhale 2 puffs Every 4 (Four) Hours As Needed for Wheezing.        Pharmacy where request should be sent: [id number] [id number] [place] [address] AT [facility] & [place] - [phone] [facility] [phone]      Last office visit with prescribing clinician: 12/21/2023   Last telemedicine visit with prescribing clinician: Visit date not found   Next office visit with prescribing clinician: Visit date not found     Additional details provided by patient: PATIENT STATES SHE IS COMPLETLEY OUT OF THESE PRESCRIPTIONS.     Does the patient have less than a 3 day supply:  [x] Yes  [] No    Would you like a call back once the refill request has been completed: [x] Yes [] No    If the office needs to give you a call back, can they leave a voicemail: [x] Yes [] No    Edna Rodriguez, PCT   03/25/24 08:45 EDT       "

## 2024-03-25 NOTE — DISCHARGE INSTRUCTIONS
Take the following medications the morning of surgery: NONE      If you are on prescription narcotic pain medication to control your pain you may also take that medication the morning of surgery.    General Instructions:  Do not eat solid food after midnight the night before surgery.  You may drink clear liquids day of surgery but must stop at least one hour before your hospital arrival time.  It is beneficial for you to have a clear drink that contains carbohydrates the day of surgery.  We suggest a 12 to 20 ounce bottle of Gatorade or Powerade for non-diabetic patients or a 12 to 20 ounce bottle of G2 or Powerade Zero for diabetic patients. (Pediatric patients, are not advised to drink a 12 to 20 ounce carbohydrate drink)    Clear liquids are liquids you can see through.  Nothing red in color.     Plain water                               Sports drinks  Sodas                                   Gelatin (Jell-O)  Fruit juices without pulp such as white grape juice and apple juice  Popsicles that contain no fruit or yogurt  Tea or coffee (no cream or milk added)  Gatorade / Powerade  G2 / Powerade Zero    Infants may have breast milk up to four hours before surgery.  Infants drinking formula may drink formula up to six hours before surgery.   Patients who avoid smoking, chewing tobacco and alcohol for 4 weeks prior to surgery have a reduced risk of post-operative complications.  Quit smoking as many days before surgery as you can.  Do not smoke, use chewing tobacco or drink alcohol the day of surgery.   If applicable bring your C-PAP/ BI-PAP machine in with you to preop day of surgery.  Bring any papers given to you in the doctor’s office.  Wear clean comfortable clothes.  Do not wear contact lenses, false eyelashes or make-up.  Bring a case for your glasses.   Bring crutches or walker if applicable.  Remove all piercings.  Leave jewelry and any other valuables at home.  Hair extensions with metal clips must be  removed prior to surgery.  The Pre-Admission Testing nurse will instruct you to bring medications if unable to obtain an accurate list in Pre-Admission Testing.        If you were given a blood bank ID arm band remember to bring it with you the day of surgery.    Preventing a Surgical Site Infection:  For 2 to 3 days before surgery, avoid shaving with a razor because the razor can irritate skin and make it easier to develop an infection.    Any areas of open skin can increase the risk of a post-operative wound infection by allowing bacteria to enter and travel throughout the body.  Notify your surgeon if you have any skin wounds / rashes even if it is not near the expected surgical site.  The area will need assessed to determine if surgery should be delayed until it is healed.  The night prior to surgery shower using a fresh bar of anti-bacterial soap (such as Dial) and clean washcloth.  Sleep in a clean bed with clean clothing.  Do not allow pets to sleep with you.  Shower on the morning of surgery using a fresh bar of anti-bacterial soap (such as Dial) and clean washcloth.  Dry with a clean towel and dress in clean clothing.  Ask your surgeon if you will be receiving antibiotics prior to surgery.  Make sure you, your family, and all healthcare providers clean their hands with soap and water or an alcohol based hand  before caring for you or your wound.    Day of surgery:  Your arrival time is approximately two hours before your scheduled surgery time.  Upon arrival, a Pre-op nurse and Anesthesiologist will review your health history, obtain vital signs, and answer questions you may have.  The only belongings needed at this time will be a list of your home medications and if applicable your C-PAP/BI-PAP machine.  A Pre-op nurse will start an IV and you may receive medication in preparation for surgery, including something to help you relax.     Please be aware that surgery does come with discomfort.  We  want to make every effort to control your discomfort so please discuss any uncontrolled symptoms with your nurse.   Your doctor will most likely have prescribed pain medications.      If you are going home after surgery you will receive individualized written care instructions before being discharged.  A responsible adult must drive you to and from the hospital on the day of your surgery and ideally stay with you through the night.   .  Discharge prescriptions can be filled by the hospital pharmacy during regular pharmacy hours.  If you are having surgery late in the day/evening your prescription may be e-prescribed to your pharmacy.  Please verify your pharmacy hours or chose a 24 hour pharmacy to avoid not having access to your prescription because your pharmacy has closed for the day.    If you are staying overnight following surgery, you will be transported to your hospital room following the recovery period.  [facility] has [facility] rooms.    If you have any questions please call Pre-Admission Testing at [phone].  Deductibles and co-payments are collected on the day of service. Please be prepared to pay the required co-pay, deductible or deposit on the day of service as defined by your plan.    Call your surgeon immediately if you experience any of the following symptoms:  Sore Throat  Shortness of Breath or difficulty breathing  Cough  Chills  Body soreness or muscle pain  Headache  Fever  New loss of taste or smell  Do not arrive for your surgery ill.  Your procedure will need to be rescheduled to another time.  You will need to call your physician before the day of surgery to avoid any unnecessary exposure to hospital staff as well as other patients.

## 2024-03-25 NOTE — TELEPHONE ENCOUNTER
"  Caller: Radha Rivera \"Charleen Rivera\"    Relationship: Self    Best call back number: [phone]    What is the best time to reach you: ANYTIME     Who are you requesting to speak with (clinical staff, provider,  specific staff member): DR. MCCLURE     What was the call regarding: PATIENT STATES SHE IS WANTING TO KNOW IF THERE IS ANY DERMATOLOGISTS THAT DR. MCCLURE RECOMMENDS.     PATIENT IS REQUESTING A CALL BACK TO LET HER KNOW.   "

## 2024-03-26 LAB
QT INTERVAL: 370 MS
QTC INTERVAL: 425 MS

## 2024-03-29 ENCOUNTER — HOSPITAL ENCOUNTER (OUTPATIENT)
Facility: HOSPITAL | Age: 69
Setting detail: HOSPITAL OUTPATIENT SURGERY
Discharge: HOME | End: 2024-03-29
Attending: ORTHOPAEDIC SURGERY | Admitting: ORTHOPAEDIC SURGERY
Payer: MEDICARE

## 2024-03-29 ENCOUNTER — ANESTHESIA (OUTPATIENT)
Dept: PERIOP | Facility: HOSPITAL | Age: 69
End: 2024-03-29
Payer: MEDICARE

## 2024-03-29 ENCOUNTER — ANESTHESIA EVENT (OUTPATIENT)
Dept: PERIOP | Facility: HOSPITAL | Age: 69
End: 2024-03-29
Payer: MEDICARE

## 2024-03-29 VITALS
SYSTOLIC BLOOD PRESSURE: 131 MMHG | RESPIRATION RATE: 16 BRPM | TEMPERATURE: 97.8 F | DIASTOLIC BLOOD PRESSURE: 78 MMHG | HEART RATE: 70 BPM | OXYGEN SATURATION: 97 %

## 2024-03-29 DIAGNOSIS — M20.42 HAMMERTOE OF LEFT FOOT: Primary | ICD-10-CM

## 2024-03-29 PROCEDURE — 25010000002 DEXAMETHASONE SODIUM PHOSPHATE 20 MG/5ML SOLUTION: Performed by: ANESTHESIOLOGY

## 2024-03-29 PROCEDURE — 25010000002 FENTANYL CITRATE (PF) 50 MCG/ML SOLUTION: Performed by: ANESTHESIOLOGY

## 2024-03-29 PROCEDURE — 25010000002 DROPERIDOL PER 5 MG: Performed by: ANESTHESIOLOGY

## 2024-03-29 PROCEDURE — 25010000002 CEFAZOLIN PER 500 MG: Performed by: ORTHOPAEDIC SURGERY

## 2024-03-29 PROCEDURE — 25810000003 LACTATED RINGERS PER 1000 ML: Performed by: STUDENT IN AN ORGANIZED HEALTH CARE EDUCATION/TRAINING PROGRAM

## 2024-03-29 PROCEDURE — 25010000002 PROPOFOL 200 MG/20ML EMULSION: Performed by: ANESTHESIOLOGY

## 2024-03-29 PROCEDURE — 25010000002 BUPIVACAINE (PF) 0.5 % SOLUTION: Performed by: ORTHOPAEDIC SURGERY

## 2024-03-29 PROCEDURE — 88305 TISSUE EXAM BY PATHOLOGIST: CPT | Performed by: ORTHOPAEDIC SURGERY

## 2024-03-29 RX ORDER — OXYCODONE AND ACETAMINOPHEN 7.5; 325 MG/1; MG/1
1 TABLET ORAL EVERY 4 HOURS PRN
Status: DISCONTINUED | OUTPATIENT
Start: 2024-03-29 | End: 2024-03-29 | Stop reason: HOSPADM

## 2024-03-29 RX ORDER — IPRATROPIUM BROMIDE AND ALBUTEROL SULFATE 2.5; .5 MG/3ML; MG/3ML
3 SOLUTION RESPIRATORY (INHALATION) ONCE AS NEEDED
Status: DISCONTINUED | OUTPATIENT
Start: 2024-03-29 | End: 2024-03-29 | Stop reason: HOSPADM

## 2024-03-29 RX ORDER — FENTANYL CITRATE 50 UG/ML
50 INJECTION, SOLUTION INTRAMUSCULAR; INTRAVENOUS
Status: DISCONTINUED | OUTPATIENT
Start: 2024-03-29 | End: 2024-03-29 | Stop reason: HOSPADM

## 2024-03-29 RX ORDER — ONDANSETRON 4 MG/1
4 TABLET, FILM COATED ORAL EVERY 8 HOURS PRN
Qty: 20 TABLET | Refills: 0 | Status: SHIPPED | OUTPATIENT
Start: 2024-03-29 | End: 2024-04-28

## 2024-03-29 RX ORDER — SODIUM CHLORIDE 0.9 % (FLUSH) 0.9 %
3-10 SYRINGE (ML) INJECTION AS NEEDED
Status: DISCONTINUED | OUTPATIENT
Start: 2024-03-29 | End: 2024-03-29 | Stop reason: HOSPADM

## 2024-03-29 RX ORDER — HYDROCODONE BITARTRATE AND ACETAMINOPHEN 5; 325 MG/1; MG/1
1 TABLET ORAL EVERY 4 HOURS PRN
Qty: 15 TABLET | Refills: 0 | Status: SHIPPED | OUTPATIENT
Start: 2024-03-29

## 2024-03-29 RX ORDER — EPHEDRINE SULFATE 50 MG/ML
5 INJECTION, SOLUTION INTRAVENOUS ONCE AS NEEDED
Status: DISCONTINUED | OUTPATIENT
Start: 2024-03-29 | End: 2024-03-29 | Stop reason: HOSPADM

## 2024-03-29 RX ORDER — SODIUM CHLORIDE, SODIUM LACTATE, POTASSIUM CHLORIDE, CALCIUM CHLORIDE 600; 310; 30; 20 MG/100ML; MG/100ML; MG/100ML; MG/100ML
9 INJECTION, SOLUTION INTRAVENOUS CONTINUOUS
Status: DISCONTINUED | OUTPATIENT
Start: 2024-03-29 | End: 2024-03-29 | Stop reason: HOSPADM

## 2024-03-29 RX ORDER — SODIUM CHLORIDE 0.9 % (FLUSH) 0.9 %
3 SYRINGE (ML) INJECTION EVERY 12 HOURS SCHEDULED
Status: DISCONTINUED | OUTPATIENT
Start: 2024-03-29 | End: 2024-03-29 | Stop reason: HOSPADM

## 2024-03-29 RX ORDER — FENTANYL CITRATE 50 UG/ML
INJECTION, SOLUTION INTRAMUSCULAR; INTRAVENOUS AS NEEDED
Status: DISCONTINUED | OUTPATIENT
Start: 2024-03-29 | End: 2024-03-29 | Stop reason: SURG

## 2024-03-29 RX ORDER — DEXAMETHASONE SODIUM PHOSPHATE 4 MG/ML
INJECTION, SOLUTION INTRA-ARTICULAR; INTRALESIONAL; INTRAMUSCULAR; INTRAVENOUS; SOFT TISSUE AS NEEDED
Status: DISCONTINUED | OUTPATIENT
Start: 2024-03-29 | End: 2024-03-29 | Stop reason: SURG

## 2024-03-29 RX ORDER — PROMETHAZINE HYDROCHLORIDE 25 MG/1
25 TABLET ORAL ONCE AS NEEDED
Status: DISCONTINUED | OUTPATIENT
Start: 2024-03-29 | End: 2024-03-29 | Stop reason: HOSPADM

## 2024-03-29 RX ORDER — DROPERIDOL 2.5 MG/ML
0.62 INJECTION, SOLUTION INTRAMUSCULAR; INTRAVENOUS
Status: DISCONTINUED | OUTPATIENT
Start: 2024-03-29 | End: 2024-03-29 | Stop reason: HOSPADM

## 2024-03-29 RX ORDER — ALBUTEROL SULFATE 90 UG/1
AEROSOL, METERED RESPIRATORY (INHALATION) AS NEEDED
Status: DISCONTINUED | OUTPATIENT
Start: 2024-03-29 | End: 2024-03-29 | Stop reason: SURG

## 2024-03-29 RX ORDER — ACETAMINOPHEN 500 MG
1000 TABLET ORAL ONCE
Status: COMPLETED | OUTPATIENT
Start: 2024-03-29 | End: 2024-03-29

## 2024-03-29 RX ORDER — DROPERIDOL 2.5 MG/ML
INJECTION, SOLUTION INTRAMUSCULAR; INTRAVENOUS AS NEEDED
Status: DISCONTINUED | OUTPATIENT
Start: 2024-03-29 | End: 2024-03-29 | Stop reason: SURG

## 2024-03-29 RX ORDER — HYDROCODONE BITARTRATE AND ACETAMINOPHEN 5; 325 MG/1; MG/1
1 TABLET ORAL ONCE AS NEEDED
Status: DISCONTINUED | OUTPATIENT
Start: 2024-03-29 | End: 2024-03-29 | Stop reason: HOSPADM

## 2024-03-29 RX ORDER — PHENYLEPHRINE HCL IN 0.9% NACL 1 MG/10 ML
SYRINGE (ML) INTRAVENOUS AS NEEDED
Status: DISCONTINUED | OUTPATIENT
Start: 2024-03-29 | End: 2024-03-29 | Stop reason: SURG

## 2024-03-29 RX ORDER — LORATADINE PSEUDOEPHEDRINE SULFATE 10; 240 MG/1; MG/1
2 TABLET, EXTENDED RELEASE ORAL DAILY
Status: ON HOLD | COMMUNITY
End: 2024-03-29

## 2024-03-29 RX ORDER — LABETALOL HYDROCHLORIDE 5 MG/ML
5 INJECTION, SOLUTION INTRAVENOUS
Status: DISCONTINUED | OUTPATIENT
Start: 2024-03-29 | End: 2024-03-29 | Stop reason: HOSPADM

## 2024-03-29 RX ORDER — LIDOCAINE HYDROCHLORIDE 10 MG/ML
0.5 INJECTION, SOLUTION INFILTRATION; PERINEURAL ONCE AS NEEDED
Status: DISCONTINUED | OUTPATIENT
Start: 2024-03-29 | End: 2024-03-29 | Stop reason: HOSPADM

## 2024-03-29 RX ORDER — BUPIVACAINE HYDROCHLORIDE 5 MG/ML
INJECTION, SOLUTION EPIDURAL; INTRACAUDAL AS NEEDED
Status: DISCONTINUED | OUTPATIENT
Start: 2024-03-29 | End: 2024-03-29 | Stop reason: HOSPADM

## 2024-03-29 RX ORDER — FENTANYL CITRATE 50 UG/ML
50 INJECTION, SOLUTION INTRAMUSCULAR; INTRAVENOUS ONCE AS NEEDED
Status: DISCONTINUED | OUTPATIENT
Start: 2024-03-29 | End: 2024-03-29 | Stop reason: HOSPADM

## 2024-03-29 RX ORDER — HYDROMORPHONE HYDROCHLORIDE 1 MG/ML
0.5 INJECTION, SOLUTION INTRAMUSCULAR; INTRAVENOUS; SUBCUTANEOUS
Status: DISCONTINUED | OUTPATIENT
Start: 2024-03-29 | End: 2024-03-29 | Stop reason: HOSPADM

## 2024-03-29 RX ORDER — HYDRALAZINE HYDROCHLORIDE 20 MG/ML
5 INJECTION INTRAMUSCULAR; INTRAVENOUS
Status: DISCONTINUED | OUTPATIENT
Start: 2024-03-29 | End: 2024-03-29 | Stop reason: HOSPADM

## 2024-03-29 RX ORDER — NALOXONE HCL 0.4 MG/ML
0.2 VIAL (ML) INJECTION AS NEEDED
Status: DISCONTINUED | OUTPATIENT
Start: 2024-03-29 | End: 2024-03-29 | Stop reason: HOSPADM

## 2024-03-29 RX ORDER — LIDOCAINE HYDROCHLORIDE 20 MG/ML
INJECTION, SOLUTION EPIDURAL; INFILTRATION; INTRACAUDAL; PERINEURAL AS NEEDED
Status: DISCONTINUED | OUTPATIENT
Start: 2024-03-29 | End: 2024-03-29 | Stop reason: SURG

## 2024-03-29 RX ORDER — ONDANSETRON 2 MG/ML
4 INJECTION INTRAMUSCULAR; INTRAVENOUS ONCE AS NEEDED
Status: DISCONTINUED | OUTPATIENT
Start: 2024-03-29 | End: 2024-03-29 | Stop reason: HOSPADM

## 2024-03-29 RX ORDER — PROMETHAZINE HYDROCHLORIDE 25 MG/1
25 SUPPOSITORY RECTAL ONCE AS NEEDED
Status: DISCONTINUED | OUTPATIENT
Start: 2024-03-29 | End: 2024-03-29 | Stop reason: HOSPADM

## 2024-03-29 RX ORDER — PROPOFOL 10 MG/ML
INJECTION, EMULSION INTRAVENOUS AS NEEDED
Status: DISCONTINUED | OUTPATIENT
Start: 2024-03-29 | End: 2024-03-29 | Stop reason: SURG

## 2024-03-29 RX ORDER — MIDAZOLAM HYDROCHLORIDE 1 MG/ML
0.5 INJECTION INTRAMUSCULAR; INTRAVENOUS
Status: DISCONTINUED | OUTPATIENT
Start: 2024-03-29 | End: 2024-03-29 | Stop reason: HOSPADM

## 2024-03-29 RX ORDER — FLUMAZENIL 0.1 MG/ML
0.2 INJECTION INTRAVENOUS AS NEEDED
Status: DISCONTINUED | OUTPATIENT
Start: 2024-03-29 | End: 2024-03-29 | Stop reason: HOSPADM

## 2024-03-29 RX ORDER — DIPHENHYDRAMINE HYDROCHLORIDE 50 MG/ML
12.5 INJECTION INTRAMUSCULAR; INTRAVENOUS
Status: DISCONTINUED | OUTPATIENT
Start: 2024-03-29 | End: 2024-03-29 | Stop reason: HOSPADM

## 2024-03-29 RX ADMIN — CEFAZOLIN 2000 MG: 2 INJECTION, POWDER, FOR SOLUTION INTRAVENOUS at 15:38

## 2024-03-29 RX ADMIN — LIDOCAINE HYDROCHLORIDE 100 MG: 20 INJECTION, SOLUTION EPIDURAL; INFILTRATION; INTRACAUDAL; PERINEURAL at 15:49

## 2024-03-29 RX ADMIN — Medication 100 MCG: at 16:03

## 2024-03-29 RX ADMIN — DROPERIDOL 1.25 MG: 2.5 INJECTION, SOLUTION INTRAMUSCULAR; INTRAVENOUS at 15:45

## 2024-03-29 RX ADMIN — ALBUTEROL SULFATE 2 PUFF: 90 AEROSOL, METERED RESPIRATORY (INHALATION) at 15:47

## 2024-03-29 RX ADMIN — PROPOFOL 160 MG: 10 INJECTION, EMULSION INTRAVENOUS at 15:49

## 2024-03-29 RX ADMIN — Medication 150 MCG: at 16:09

## 2024-03-29 RX ADMIN — SODIUM CHLORIDE, POTASSIUM CHLORIDE, SODIUM LACTATE AND CALCIUM CHLORIDE: 600; 310; 30; 20 INJECTION, SOLUTION INTRAVENOUS at 16:10

## 2024-03-29 RX ADMIN — FENTANYL CITRATE 50 MCG: 50 INJECTION, SOLUTION INTRAMUSCULAR; INTRAVENOUS at 15:45

## 2024-03-29 RX ADMIN — SODIUM CHLORIDE, POTASSIUM CHLORIDE, SODIUM LACTATE AND CALCIUM CHLORIDE 9 ML/HR: 600; 310; 30; 20 INJECTION, SOLUTION INTRAVENOUS at 13:18

## 2024-03-29 RX ADMIN — ACETAMINOPHEN 1000 MG: 500 TABLET ORAL at 13:17

## 2024-03-29 RX ADMIN — DEXAMETHASONE SODIUM PHOSPHATE 4 MG: 4 INJECTION, SOLUTION INTRAMUSCULAR; INTRAVENOUS at 15:53

## 2024-03-29 NOTE — ANESTHESIA PREPROCEDURE EVALUATION
Anesthesia Evaluation     Patient summary reviewed and Nursing notes reviewed   no history of anesthetic complications:   NPO Solid Status: > 8 hours  NPO Liquid Status: > 2 hours           Airway   Mallampati: II  TM distance: >3 FB  Neck ROM: full  Dental      Pulmonary    (+) asthma,  Cardiovascular     ECG reviewed        Neuro/Psych  GI/Hepatic/Renal/Endo    (+) GERD    Musculoskeletal     Abdominal    Substance History      OB/GYN          Other                          Anesthesia Plan    ASA 2     general     intravenous induction     Anesthetic plan, risks, benefits, and alternatives have been provided, discussed and informed consent has been obtained with: patient.        CODE STATUS:

## 2024-03-29 NOTE — OP NOTE
Procedure Note    Radha Rivera  3/29/2024    Pre-op Diagnosis:   Severe rigid left fourth hammertoe/claw toe       Post-Op Diagnosis Codes:  Same    Procedure:  Left partial fourth toe amputation      Surgeon(s):  Paolo Goncalves Jr., MD    Assistants:  None    Anesthesia: General with digital block    Estimated Blood Loss: minimal    Specimens:                1.  Left distal fourth toe for permanent pathology      Drains: * No LDAs found *    Complications:   None apparent    Disposition:  Stable to PACU for recovery    Indications for procedure:  The patient is a very pleasant 69-year-old female who has had progressive pain and dysfunction related to severe left fourth rigid hammertoe/claw toe.  Her symptoms were refractory to prolonged nonoperative management.  She requested surgical intervention.  Forefoot reconstruction versus simple amputation were discussed at length with the patient and she elected proceed with the above listed procedure.  The risks/benefits of surgery, including pain, infection, wound healing problems, need for future procedures, DVT/PE, cardiac event, and/or death were discussed, and the patient elected to proceed with surgery.    Procedure in detail:  The correct patient was identified in preoperative holding.  All risks and benefits of surgery were again discussed in detail, and the patient agreed to proceed with surgery.  The operative extremity was confirmed and marked by myself.  Operative consent reviewed and confirmed to be signed by the patient and myself.    At this time, the patient was wheeled to the operative theatre and placed supine on the OR table.  TRI/SCD placed on nonoperative leg. Anesthesia was induced smoothly by our anesthesia colleagues.   The left lower extremity was prepped and draped in standard sterile fashion.  Appropriate presurgical timeout was performed, confirming correct patient, correct extremity, correct procedure, availability of sterile  instruments/implants, and the administration of intravenous antibiotics in the form of Ancef within one hour of skin incision.    At this time, a modified racquet type incision was made centered at the fourth toe PIP joint.  Full-thickness flaps were raised.  The distal aspect of the toe was grasped with a towel clip and the toe was sharply disarticulated at the PIP joint.    Tourniquet released.  Brisk capillary refill returned to all toes.  She appeared to have adequate vascularity at this level for healing.  The wound was copiously irrigated and closed meticulously in layers with 3-0 Vicryl and 3-0 nylon.  Xeroform, sterile gauze, soft compressive dressing replaced.  Drapes taken down.  Patient was awoken from anesthesia without apparent complication and taken to PACU for recovery.            Paolo Goncalves Jr, MD     Date: 3/29/2024  Time: 14:39 EDT

## 2024-03-29 NOTE — ANESTHESIA POSTPROCEDURE EVALUATION
Patient: Radha Rivera    Procedure Summary       Date: 03/29/24 Room / Location:  [facility] [address] [place] [address] [facility]    Anesthesia Start: 1542 Anesthesia Stop: 1626    Procedure: LEFT PARTIAL FOURTH TOE AMPUTATION (Left: Fingers) Diagnosis:     Surgeons: Paolo Goncalves Jr., MD Provider: Wally Alcaraz DO    Anesthesia Type: general ASA Status: 2            Anesthesia Type: general    Vitals  Vitals Value Taken Time   /78 03/29/24 1645   Temp 36.6 °C (97.8 °F) 03/29/24 1645   Pulse 74 03/29/24 1649   Resp 16 03/29/24 1645   SpO2 97 % 03/29/24 1649   Vitals shown include unfiled device data.        Post Anesthesia Care and Evaluation    Patient location during evaluation: bedside  Patient participation: complete - patient participated  Level of consciousness: awake and alert  Pain management: adequate    Airway patency: patent  Anesthetic complications: No anesthetic complications  PONV Status: controlled  Cardiovascular status: blood pressure returned to baseline and acceptable  Respiratory status: acceptable  Hydration status: acceptable     Sotyktu Pregnancy And Lactation Text: There is insufficient data to evaluate whether or not Sotyktu is safe to use during pregnancy.   It is not known if Sotyktu passes into breast milk and whether or not it is safe to use when breastfeeding.

## 2024-03-29 NOTE — ANESTHESIA PROCEDURE NOTES
Airway  Urgency: elective    Date/Time: 3/29/2024 3:51 PM  End Time:3/29/2024 3:52 PM  Airway not difficult    General Information and Staff    Patient location during procedure: OR  Anesthesiologist: Wally Alcaraz DO    Indications and Patient Condition  Indications for airway management: airway protection    Preoxygenated: yes  Mask difficulty assessment: 1 - vent by mask    Final Airway Details  Final airway type: supraglottic airway      Successful airway: classic  Size 4  Airway Seal Pressure (cm H2O): 20     Number of attempts at approach: 1  Assessment: lips, teeth, and gum same as pre-op

## 2024-04-02 LAB
LAB AP CASE REPORT: NORMAL
PATH REPORT.FINAL DX SPEC: NORMAL
PATH REPORT.GROSS SPEC: NORMAL

## 2024-05-02 RX ORDER — ALBUTEROL SULFATE 90 UG/1
2 AEROSOL, METERED RESPIRATORY (INHALATION) EVERY 4 HOURS PRN
Qty: 8.5 G | Refills: 2 | Status: SHIPPED | OUTPATIENT
Start: 2024-05-02

## 2024-05-02 NOTE — TELEPHONE ENCOUNTER
Caller: Radha Rivera    Relationship: Self    Best call back number: [phone]     Requested Prescriptions:   Requested Prescriptions     Pending Prescriptions Disp Refills    albuterol sulfate  (90 Base) MCG/ACT inhaler [Pharmacy Med Name: ALBUTEROL HFA INH (200 PUFFS) 8.5GM] 8.5 g      Sig: INHALE 2 PUFFS BY MOUTH EVERY 4 HOURS AS NEEDED FOR WHEEZING        Pharmacy where request should be sent: [id number] [address] AT [facility] [phone] [facility] [phone]      Last office visit with prescribing clinician: 12/21/2023   Last telemedicine visit with prescribing clinician: Visit date not found   Next office visit with prescribing clinician: Visit date not found     Additional details provided by patient: PATIENT IS COMPLETELY OUT    Does the patient have less than a 3 day supply:  [x] Yes  [] No    Would you like a call back once the refill request has been completed: [] Yes [] No    If the office needs to give you a call back, can they leave a voicemail: [] Yes [] No    Mere Aly Rep   05/02/24 10:44 EDT

## 2024-11-05 ENCOUNTER — OFFICE VISIT (OUTPATIENT)
Dept: OBSTETRICS AND GYNECOLOGY | Age: 69
End: 2024-11-05
Payer: MEDICARE

## 2024-11-05 VITALS
DIASTOLIC BLOOD PRESSURE: 70 MMHG | BODY MASS INDEX: 26.37 KG/M2 | HEIGHT: 68 IN | WEIGHT: 174 LBS | SYSTOLIC BLOOD PRESSURE: 128 MMHG

## 2024-11-05 DIAGNOSIS — Z12.31 BREAST CANCER SCREENING BY MAMMOGRAM: Primary | ICD-10-CM

## 2024-11-05 DIAGNOSIS — M85.852 OSTEOPENIA OF BOTH HIPS: ICD-10-CM

## 2024-11-05 DIAGNOSIS — M85.851 OSTEOPENIA OF BOTH HIPS: ICD-10-CM

## 2024-11-05 DIAGNOSIS — N95.2 ATROPHIC VAGINITIS: ICD-10-CM

## 2024-11-05 PROBLEM — Z87.42 HISTORY OF ABNORMAL CERVICAL PAP SMEAR: Status: ACTIVE | Noted: 2024-11-05

## 2024-11-05 NOTE — PROGRESS NOTES
Subjective       History of Present Illness  Radha Rivera is a 69 y.o. female is being seen today for several problems.  She has a history of atrophic vaginitis, has age-appropriate osteopenia and is concerned about her weight.  Additionally Charleen has a history of abnormal Pap smear FRITZ-3 in the past but recent ones have been normal and she is up-to-date on that currently.  Chief Complaint   Patient presents with    Gynecologic Exam     Gyn problem: LAC 10/23,last pap 10/23,mammo 3/24,colonoscopy ,dexa    .        The following portions of the patient's history were reviewed and updated as appropriate: allergies, current medications, past family history, past medical history, past social history, past surgical history and problem list.    PAST MEDICAL HISTORY  Past Medical History:   Diagnosis Date    Asthma     Cancer     Carcinoma in situ of cervix     IN     GERD (gastroesophageal reflux disease)     OCCAS    Liver hemangioma     s/p imaging eval in past    Overweight (BMI 25.0-29.9) 10/29/2019    Pap smear abnormality of cervix with ASCUS favoring benign         PMB (postmenopausal bleeding)      OB History    Para Term  AB Living   2 2 2         SAB IAB Ectopic Molar Multiple Live Births                    # Outcome Date GA Lbr [place]/2nd Weight Sex Type Anes PTL Lv   2 Term            1 Term              Past Surgical History:   Procedure Laterality Date    AMPUTATION DIGIT Left 3/29/2024    Procedure: LEFT PARTIAL FOURTH TOE AMPUTATION;  Surgeon: Paolo Goncalves Jr., MD;  [facility]: [facility] OR [facility];  Service: Orthopedics;  Laterality: Left;    CATARACT EXTRACTION, BILATERAL Bilateral 2019     SECTION      CHOLECYSTECTOMY      COLONOSCOPY N/A 2016    Procedure: COLONOSCOPY TO CECUM;  Surgeon: Aisha Partida MD;  Location: [facility] ENDOSCOPY;  Service:     DILATATION AND CURETTAGE      DILATION AND CURETTAGE, DIAGNOSTIC / THERAPEUTIC      endometrial hyperplasia     TONSILLECTOMY       Family History   Problem Relation Age of Onset    Hypertension Mother     Thyroid disease Mother     Osteoporosis Mother     Alcohol abuse Father     No Known Problems Daughter     No Known Problems Son     Cancer Maternal Grandmother         breast cancer    Breast cancer Maternal Grandmother     Cancer Cousin         breast cancer - two cousins    Breast cancer Cousin     Malig Hyperthermia Neg Hx      Social History     Tobacco Use   Smoking Status Never    Passive exposure: Never   Smokeless Tobacco Never       Current Outpatient Medications:     albuterol sulfate  (90 Base) MCG/ACT inhaler, INHALE 2 PUFFS BY MOUTH EVERY 4 HOURS AS NEEDED FOR WHEEZING, Disp: 8.5 g, Rfl: 2    aspirin 81 MG EC tablet, Take 1 tablet by mouth Daily., Disp: 30 tablet, Rfl: 3    Cholecalciferol (VITAMIN D) 1000 UNITS tablet, Take 1 tablet by mouth Daily., Disp: , Rfl:     Loratadine-Pseudoephedrine (CLARITIN-D 24 HOUR PO), Take  by mouth., Disp: , Rfl:     montelukast (SINGULAIR) 10 MG tablet, Take 1 tablet by mouth Every Night., Disp: 90 tablet, Rfl: 3    omeprazole (priLOSEC) 40 MG capsule, Take 1 capsule by mouth Daily., Disp: 90 capsule, Rfl: 2  Immunization History   Administered Date(s) Administered    COVID-19 (PFIZER) Purple Cap Monovalent 03/13/2021, 04/03/2021    Fluzone High-Dose 65+YRS 10/29/2019, 10/20/2020    Fluzone High-Dose 65+yrs 11/11/2021, 11/17/2022, 12/21/2023    Fluzone Quad >6mos (Multi-dose) 10/10/2016, 09/18/2017    Pneumococcal Conjugate 13-Valent (PCV13) 10/29/2019    Pneumococcal Polysaccharide (PPSV23) 12/08/2020    Shingrix 11/17/2020, 02/12/2021    Tdap 06/01/2010, 11/17/2020    Zostavax 09/12/2016       Review of Systems       Except as outlined in history of physical illness, patient denies any changes in her GYN, , GI systems. All other systems reviewed are negative.    Objective   Physical Exam   Alert and oriented, respirations unlabored, heart regular rate and rhythm    Pelvic external genitalia normal female vagina shows mild atrophy cervix uterus adnexa nonenlarged non tender  Rectal exam normal  Breast even symmetric no lymphadenopathy no discharge no erythema  Heart regular rate and rhythm  No thyromegaly    Review of systems as outlined above with some concern for weight we discussed nutrition and exercise protein intake etc.    Assessment & Plan   Diagnoses and all orders for this visit:    1. Breast cancer screening by mammogram (Primary)  -     Mammo Screening Digital Tomosynthesis Bilateral With CAD; Future    2. Atrophic vaginitis    3. Osteopenia of both hips                 Orders Placed This Encounter   Procedures    Mammo Screening Digital Tomosynthesis Bilateral With CAD     Standing Status:   Future     Standing Expiration Date:   11/15/2026     Order Specific Question:   Reason for Exam:     Answer:   Breast cancer screening     Order Specific Question:   Release to patient     Answer:   Routine Release [[id number]]           EMR Dragon/ Transcription disclaimer:  Much of the encounter note is an electronic transcription/translation of spoken language to printed text. The electronic translation of spoken language may permit erroneous, or at times, nonessential words or phrases to be inadvertently transcribes; Although i have reviewed the note for such errors, some may still exist.

## 2024-11-18 ENCOUNTER — HOSPITAL ENCOUNTER (OUTPATIENT)
Facility: HOSPITAL | Age: 69
Discharge: HOME | End: 2024-11-18
Attending: STUDENT IN AN ORGANIZED HEALTH CARE EDUCATION/TRAINING PROGRAM | Admitting: STUDENT IN AN ORGANIZED HEALTH CARE EDUCATION/TRAINING PROGRAM
Payer: MEDICARE

## 2024-11-18 VITALS
WEIGHT: 170 LBS | HEIGHT: 67 IN | HEART RATE: 98 BPM | BODY MASS INDEX: 26.68 KG/M2 | DIASTOLIC BLOOD PRESSURE: 74 MMHG | TEMPERATURE: 97.7 F | SYSTOLIC BLOOD PRESSURE: 134 MMHG | OXYGEN SATURATION: 96 % | RESPIRATION RATE: 18 BRPM

## 2024-11-18 DIAGNOSIS — S61.412A LACERATION OF LEFT HAND WITHOUT FOREIGN BODY, INITIAL ENCOUNTER: Primary | ICD-10-CM

## 2024-11-18 PROCEDURE — G0463 HOSPITAL OUTPT CLINIC VISIT: HCPCS | Performed by: PHYSICIAN ASSISTANT

## 2024-11-18 PROCEDURE — 25010000002 LIDOCAINE 1% - EPINEPHRINE 1:100000 1 %-1:100000 SOLUTION: Performed by: PHYSICIAN ASSISTANT

## 2024-11-18 RX ORDER — LIDOCAINE HYDROCHLORIDE AND EPINEPHRINE 10; 10 MG/ML; UG/ML
10 INJECTION, SOLUTION INFILTRATION; PERINEURAL ONCE
Status: COMPLETED | OUTPATIENT
Start: 2024-11-18 | End: 2024-11-18

## 2024-11-18 RX ORDER — GINSENG 100 MG
1 CAPSULE ORAL 2 TIMES DAILY
Qty: 14.2 G | Refills: 0 | Status: SHIPPED | OUTPATIENT
Start: 2024-11-18

## 2024-11-18 RX ADMIN — LIDOCAINE HYDROCHLORIDE AND EPINEPHRINE 10 ML: 10; 10 INJECTION, SOLUTION INFILTRATION; PERINEURAL at 16:19

## 2024-11-18 NOTE — DISCHARGE INSTRUCTIONS
Keep laceration clean and dry, apply antibiotic ointment once or twice daily, watch carefully for signs of infection, sutures need to be removed in 7 days. Return to care if any complications.

## 2024-11-18 NOTE — FSED PROVIDER NOTE
EMERGENCY DEPARTMENT ENCOUNTER    Room Number:  12/12  Date seen:  11/18/2024  Time seen: 16:11 EST  PCP: Paolo Dc MD  Historian: Patient    Discussed/obtained information from independent historians: N/A    HPI:  Chief complaint: Left heel laceration  A complete HPI/ROS/PMH/PSH/SH/FH are unobtainable due to: Nothing  Context:Radha Rivera is a 69 y.o. female who presents to the ED with c/o left hand laceration that occurred just prior to arrival.  Patient reports she was helping her grandson open a package and cut the dorsal surface of her hand in between the first and second digit/first webspace with a steak knife.  Laceration said to be roughly a centimeter and a half in length.  Bleeding controlled at this time.  There is mild surrounding soft tissue swelling.  No numbness distal to or surrounding the laceration site.  Patient states she has full range of motion and denies any sort of deficits secondary to tendon injury.    Last Tdap 2020 and up-to-date.        Chronic or social conditions impacting care:    ALLERGIES  Zostavax [zoster vaccine live]    PAST MEDICAL HISTORY  Active Ambulatory Problems     Diagnosis Date Noted   • Mild persistent asthma without complication 09/02/2016   • Environmental and seasonal allergies 10/29/2019   • Overweight (BMI 25.0-29.9) 10/29/2019   • GERD (gastroesophageal reflux disease) 01/05/2021   • Trochanteric bursitis of right hip 11/11/2021   • History of migraine 06/15/2023   • Fibromuscular dysplasia 06/15/2023   • Osteopenia of both hips 12/21/2023   • History of abnormal cervical Pap smear 11/05/2024     Resolved Ambulatory Problems     Diagnosis Date Noted   • Adult hypothyroidism 09/02/2016   • Abnormal finding on mammography 09/02/2016   • Elevated liver enzymes 09/12/2016   • Dizziness 06/09/2023   • Elevated blood pressure reading 06/10/2023     Past Medical History:   Diagnosis Date   • Asthma    • Cancer    • Carcinoma in situ of cervix    • Liver  hemangioma    • Pap smear abnormality of cervix with ASCUS favoring benign    • PMB (postmenopausal bleeding)        PAST SURGICAL HISTORY  Past Surgical History:   Procedure Laterality Date   • AMPUTATION DIGIT Left 3/29/2024    Procedure: LEFT PARTIAL FOURTH TOE AMPUTATION;  Surgeon: Paolo Goncalves Jr., MD;  [facility] [facility];  Service: Orthopedics;  Laterality: Left;   • CATARACT EXTRACTION, BILATERAL Bilateral 2019   •  SECTION     • CHOLECYSTECTOMY     • COLONOSCOPY N/A 2016    Procedure: COLONOSCOPY TO CECUM;  Surgeon: Aisha Partida MD;  Location: [facility] ENDOSCOPY;  Service:    • DILATATION AND CURETTAGE     • DILATION AND CURETTAGE, DIAGNOSTIC / THERAPEUTIC      endometrial hyperplasia   • TONSILLECTOMY         FAMILY HISTORY  Family History   Problem Relation Age of Onset   • Hypertension Mother    • Thyroid disease Mother    • Osteoporosis Mother    • Alcohol abuse Father    • No Known Problems Daughter    • No Known Problems Son    • Cancer Maternal Grandmother         breast cancer   • Breast cancer Maternal Grandmother    • Cancer Cousin         breast cancer - two cousins   • Breast cancer Cousin    • Malig Hyperthermia Neg Hx        SOCIAL HISTORY  Social History     Socioeconomic History   • Marital status:    • Number of children: 2   Tobacco Use   • Smoking status: Never     Passive exposure: Never   • Smokeless tobacco: Never   Vaping Use   • Vaping status: Never Used   Substance and Sexual Activity   • Alcohol use: Yes     Alcohol/week: 3.0 standard drinks of alcohol     Types: 3 Glasses of wine per week     Comment: 3 glasses wine/ week   • Drug use: No   • Sexual activity: Yes     Partners: Male     Comment:  only; HPV in past       REVIEW OF SYSTEMS  Review of Systems    All systems reviewed and negative except for those discussed in HPI.     PHYSICAL EXAM    I have reviewed the triage vital signs and nursing notes.  Vitals:    24 1447   BP:  134/74   Pulse: 98   Resp: 18   Temp: 97.7 °F (36.5 °C)   SpO2: 96%     Physical Exam    GENERAL: WDWN female, not distressed  HENT: nares patent  EYES: no scleral icterus  NECK: no ROM limitations  CV: regular rhythm, regular rate  RESPIRATORY: normal effort  ABDOMEN: soft  : deferred  MUSCULOSKELETAL: no deformity.  Left hand: AROM intact.  NEURO: alert, moves all extremities, follows commands  SKIN: 1.5 cm linear laceration left hand dorsal surface vicinity of the proximal first webspace.  No tendon or major vascular involvement.    LAB RESULTS  No results found for this or any previous visit (from the past 24 hours).    Ordered the above labs and independently interpreted results.  My findings will be discussed in the ED course or medical decision making section below    RADIOLOGY RESULTS  No Radiology Exams Resulted Within Past 24 Hours     Ordered the above noted radiological studies.  Independently interpreted by me.  My findings will be discussed in the medical decision section below.     PROGRESS, DATA ANALYSIS, CONSULTS AND MEDICAL DECISION MAKING    Please note that this section constitutes my independent interpretation of clinical data including lab results, radiology, EKG's.  This constitutes my independent professional opinion regarding differential diagnosis and management of this patient.  It may include any factors such as history from outside sources, review of external records, social determinants of health, management of medications, response to those treatments, and discussions with other providers.       Orders placed during this visit:  Orders Placed This Encounter   Procedures   • Laceration Repair     Laceration Repair    Date/Time: 11/18/2024 4:14 PM    Performed by: Hollis Rangel III, PA  Authorized by: Ankit Spain MD    Consent:     Consent obtained:  Verbal    Consent given by:  Patient    Risks discussed:  Infection and pain  Universal protocol:     Patient identity  confirmed:  Verbally with patient and arm band  Pre-procedure details:     Preparation:  Patient was prepped and draped in usual sterile fashion and imaging obtained to evaluate for foreign bodies  Treatment:     Area cleansed with:  Shur-Clens and saline    Amount of cleaning:  Extensive    Irrigation solution:  Sterile saline  Skin repair:     Repair method:  Sutures    Suture size:  4-0    Suture material:  Nylon    Suture technique:  Simple interrupted    Number of sutures:  4  Approximation:     Approximation:  Close  Repair type:     Repair type:  Simple  Post-procedure details:     Procedure completion:  Tolerated well, no immediate complications            Medical Decision Making    This is a simple laceration.  See procedure note for details.      DIAGNOSIS  Final diagnoses:   Laceration of left hand without foreign body, initial encounter          Medication List        New Prescriptions      bacitracin 500 UNIT/GM ointment  Apply 1 Application topically to the appropriate area as directed 2 (Two) Times a Day.               Where to Get Your Medications        These medications were sent to [id number] - [place], [place] - [address] [facility] AT [facility] [address] - [phone] [facility] [phone] [address] [address]      Phone: [phone]   bacitracin 500 UNIT/GM ointment         FOLLOW-UP  Paolo Dc MD  [address] [id number] [phone]    In 10 days  For suture removal        Latest Documented Vital Signs:  As of 16:16 EST  BP- 134/74 HR- 98 Temp- 97.7 °F (36.5 °C) (Skin) O2 sat- 96%    Appropriate PPE utilized throughout this patient encounter to include mask, if indicated, per current protocol. Hand hygiene was performed before donning PPE and after removal when leaving the room.    Please note that portions of this were completed with a voice recognition program.     Note Disclaimer: At [facility], we  believe that sharing information builds trust and better relationships. You are receiving this note because you are receiving care at [facility] or recently visited. It is possible you will see health information before a provider has talked with you about it. This kind of information can be easy to misunderstand. To help you fully understand what it means for your health, we urge you to discuss this note with your provider.

## 2024-12-24 DIAGNOSIS — Z13.29 SCREENING FOR THYROID DISORDER: ICD-10-CM

## 2024-12-24 DIAGNOSIS — E78.00 ELEVATED CHOLESTEROL: ICD-10-CM

## 2024-12-24 DIAGNOSIS — R73.01 IFG (IMPAIRED FASTING GLUCOSE): ICD-10-CM

## 2024-12-24 DIAGNOSIS — R53.1 WEAKNESS: ICD-10-CM

## 2024-12-24 DIAGNOSIS — Z00.00 ENCOUNTER FOR SUBSEQUENT ANNUAL WELLNESS VISIT (AWV) IN MEDICARE PATIENT: Primary | ICD-10-CM

## 2024-12-24 DIAGNOSIS — E66.3 OVERWEIGHT (BMI 25.0-29.9): ICD-10-CM

## 2024-12-28 LAB
ALBUMIN SERPL-MCNC: 3.9 G/DL (ref 3.5–5.2)
ALBUMIN/GLOB SERPL: 1.4 G/DL
ALP SERPL-CCNC: 72 U/L (ref 39–117)
ALT SERPL-CCNC: 15 U/L (ref 1–33)
APPEARANCE UR: CLEAR
AST SERPL-CCNC: 19 U/L (ref 1–32)
BACTERIA #/AREA URNS HPF: NORMAL /[HPF]
BASOPHILS # BLD AUTO: 0.05 10*3/MM3 (ref 0–0.2)
BASOPHILS NFR BLD AUTO: 0.6 % (ref 0–1.5)
BILIRUB SERPL-MCNC: 0.4 MG/DL (ref 0–1.2)
BILIRUB UR QL STRIP: NEGATIVE
BUN SERPL-MCNC: 14 MG/DL (ref 8–23)
BUN/CREAT SERPL: 23.3 (ref 7–25)
CALCIUM SERPL-MCNC: 8.9 MG/DL (ref 8.6–10.5)
CASTS URNS QL MICRO: NORMAL /LPF
CHLORIDE SERPL-SCNC: 104 MMOL/L (ref 98–107)
CHOLEST SERPL-MCNC: 196 MG/DL (ref 0–200)
CHOLEST/HDLC SERPL: 3.38 {RATIO}
CO2 SERPL-SCNC: 27.4 MMOL/L (ref 22–29)
COLOR UR: YELLOW
CREAT SERPL-MCNC: 0.6 MG/DL (ref 0.57–1)
EGFRCR SERPLBLD CKD-EPI 2021: 96.7 ML/MIN/1.73
EOSINOPHIL # BLD AUTO: 0.24 10*3/MM3 (ref 0–0.4)
EOSINOPHIL NFR BLD AUTO: 3 % (ref 0.3–6.2)
EPI CELLS #/AREA URNS HPF: NORMAL /HPF (ref 0–10)
ERYTHROCYTE [DISTWIDTH] IN BLOOD BY AUTOMATED COUNT: 12 % (ref 12.3–15.4)
GLOBULIN SER CALC-MCNC: 2.7 GM/DL
GLUCOSE SERPL-MCNC: 96 MG/DL (ref 65–99)
GLUCOSE UR QL STRIP: NEGATIVE
HBA1C MFR BLD: 5.3 % (ref 4.8–5.6)
HCT VFR BLD AUTO: 39.9 % (ref 34–46.6)
HDLC SERPL-MCNC: 58 MG/DL (ref 40–60)
HGB BLD-MCNC: 13.4 G/DL (ref 12–15.9)
HGB UR QL STRIP: NEGATIVE
IMM GRANULOCYTES # BLD AUTO: 0.02 10*3/MM3 (ref 0–0.05)
IMM GRANULOCYTES NFR BLD AUTO: 0.3 % (ref 0–0.5)
KETONES UR QL STRIP: NEGATIVE
LDLC SERPL CALC-MCNC: 126 MG/DL (ref 0–100)
LEUKOCYTE ESTERASE UR QL STRIP: NEGATIVE
LYMPHOCYTES # BLD AUTO: 2.03 10*3/MM3 (ref 0.7–3.1)
LYMPHOCYTES NFR BLD AUTO: 25.6 % (ref 19.6–45.3)
MCH RBC QN AUTO: 30.1 PG (ref 26.6–33)
MCHC RBC AUTO-ENTMCNC: 33.6 G/DL (ref 31.5–35.7)
MCV RBC AUTO: 89.7 FL (ref 79–97)
MICRO URNS: NORMAL
MICRO URNS: NORMAL
MONOCYTES # BLD AUTO: 0.6 10*3/MM3 (ref 0.1–0.9)
MONOCYTES NFR BLD AUTO: 7.6 % (ref 5–12)
NEUTROPHILS # BLD AUTO: 4.99 10*3/MM3 (ref 1.7–7)
NEUTROPHILS NFR BLD AUTO: 62.9 % (ref 42.7–76)
NITRITE UR QL STRIP: NEGATIVE
NRBC BLD AUTO-RTO: 0 /100 WBC (ref 0–0.2)
PH UR STRIP: 5.5 [PH] (ref 5–7.5)
PLATELET # BLD AUTO: 227 10*3/MM3 (ref 140–450)
POTASSIUM SERPL-SCNC: 4.5 MMOL/L (ref 3.5–5.2)
PROT SERPL-MCNC: 6.6 G/DL (ref 6–8.5)
PROT UR QL STRIP: NEGATIVE
RBC # BLD AUTO: 4.45 10*6/MM3 (ref 3.77–5.28)
RBC #/AREA URNS HPF: NORMAL /HPF (ref 0–2)
SODIUM SERPL-SCNC: 141 MMOL/L (ref 136–145)
SP GR UR STRIP: 1.02 (ref 1–1.03)
TRIGL SERPL-MCNC: 65 MG/DL (ref 0–150)
TSH SERPL DL<=0.005 MIU/L-ACNC: 4.12 UIU/ML (ref 0.27–4.2)
URINALYSIS REFLEX: NORMAL
UROBILINOGEN UR STRIP-MCNC: 0.2 MG/DL (ref 0.2–1)
VLDLC SERPL CALC-MCNC: 12 MG/DL (ref 5–40)
WBC # BLD AUTO: 7.93 10*3/MM3 (ref 3.4–10.8)
WBC #/AREA URNS HPF: NORMAL /HPF (ref 0–5)

## 2025-01-03 ENCOUNTER — OFFICE VISIT (OUTPATIENT)
Dept: INTERNAL MEDICINE | Facility: CLINIC | Age: 70
End: 2025-01-03
Payer: MEDICARE

## 2025-01-03 VITALS
HEIGHT: 67 IN | WEIGHT: 175.6 LBS | TEMPERATURE: 97.8 F | DIASTOLIC BLOOD PRESSURE: 74 MMHG | OXYGEN SATURATION: 97 % | BODY MASS INDEX: 27.56 KG/M2 | SYSTOLIC BLOOD PRESSURE: 130 MMHG | HEART RATE: 85 BPM

## 2025-01-03 DIAGNOSIS — J20.9 ACUTE BRONCHITIS, UNSPECIFIED ORGANISM: ICD-10-CM

## 2025-01-03 DIAGNOSIS — M85.851 OSTEOPENIA OF BOTH HIPS: ICD-10-CM

## 2025-01-03 DIAGNOSIS — I77.3 FIBROMUSCULAR DYSPLASIA: ICD-10-CM

## 2025-01-03 DIAGNOSIS — Z13.820 SCREENING FOR OSTEOPOROSIS: ICD-10-CM

## 2025-01-03 DIAGNOSIS — Z87.42 HISTORY OF ABNORMAL CERVICAL PAP SMEAR: ICD-10-CM

## 2025-01-03 DIAGNOSIS — M85.852 OSTEOPENIA OF BOTH HIPS: ICD-10-CM

## 2025-01-03 DIAGNOSIS — J30.89 ENVIRONMENTAL AND SEASONAL ALLERGIES: ICD-10-CM

## 2025-01-03 DIAGNOSIS — J45.30 MILD PERSISTENT ASTHMA WITHOUT COMPLICATION: ICD-10-CM

## 2025-01-03 DIAGNOSIS — E66.3 OVERWEIGHT (BMI 25.0-29.9): ICD-10-CM

## 2025-01-03 DIAGNOSIS — Z00.00 ENCOUNTER FOR SUBSEQUENT ANNUAL WELLNESS VISIT (AWV) IN MEDICARE PATIENT: ICD-10-CM

## 2025-01-03 DIAGNOSIS — Z00.01 ENCOUNTER FOR GENERAL ADULT MEDICAL EXAMINATION WITH ABNORMAL FINDINGS: Primary | ICD-10-CM

## 2025-01-03 DIAGNOSIS — K21.9 GASTROESOPHAGEAL REFLUX DISEASE, UNSPECIFIED WHETHER ESOPHAGITIS PRESENT: ICD-10-CM

## 2025-01-03 PROBLEM — M70.61 TROCHANTERIC BURSITIS OF RIGHT HIP: Status: RESOLVED | Noted: 2021-11-11 | Resolved: 2025-01-03

## 2025-01-03 PROCEDURE — G0439 PPPS, SUBSEQ VISIT: HCPCS | Performed by: INTERNAL MEDICINE

## 2025-01-03 PROCEDURE — 1126F AMNT PAIN NOTED NONE PRSNT: CPT | Performed by: INTERNAL MEDICINE

## 2025-01-03 PROCEDURE — 1159F MED LIST DOCD IN RCRD: CPT | Performed by: INTERNAL MEDICINE

## 2025-01-03 PROCEDURE — 1160F RVW MEDS BY RX/DR IN RCRD: CPT | Performed by: INTERNAL MEDICINE

## 2025-01-03 PROCEDURE — 99397 PER PM REEVAL EST PAT 65+ YR: CPT | Performed by: INTERNAL MEDICINE

## 2025-01-03 PROCEDURE — 1170F FXNL STATUS ASSESSED: CPT | Performed by: INTERNAL MEDICINE

## 2025-01-03 RX ORDER — ALBUTEROL SULFATE 0.63 MG/3ML
1 SOLUTION RESPIRATORY (INHALATION) EVERY 6 HOURS PRN
Qty: 45 EACH | Refills: 2 | Status: SHIPPED | OUTPATIENT
Start: 2025-01-03

## 2025-01-03 RX ORDER — LORATADINE 10 MG/1
CAPSULE, LIQUID FILLED ORAL
COMMUNITY

## 2025-01-03 NOTE — PROGRESS NOTES
Subjective   Radha Rivera is a 69 y.o. female who presents for a Medicare Well Visit    Patient Care Team:  Paolo Dc MD as PCP - General (Internal Medicine)    Recent Hospitalizations: No recent hospitalization(s).    Depression Screen:       1/3/2025    12:56 PM   PHQ-2/PHQ-9 Depression Screening   Little interest or pleasure in doing things Not at all   Feeling down, depressed, or hopeless Not at all       Functional and Cognitive Screenin/3/2025    12:55 PM   Functional & Cognitive Status   Do you have difficulty preparing food and eating? No   Do you have difficulty bathing yourself, getting dressed or grooming yourself? No   Do you have difficulty using the toilet? No   Do you have difficulty moving around from place to place? No   Do you have trouble with steps or getting out of a bed or a chair? No   Current Diet Well Balanced Diet   Dental Exam Up to date   Eye Exam Not up to date   Exercise (times per week) 2 times per week   Current Exercises Include Walking   Do you need help using the phone?  No   Are you deaf or do you have serious difficulty hearing?  No   Do you need help to go to places out of walking distance? No   Do you need help shopping? No   Do you need help preparing meals?  No   Do you need help with housework?  No   Do you need help with laundry? No   Do you need help taking your medications? No   Do you need help managing money? No   Do you ever drive or ride in a car without wearing a seat belt? No   Have you felt unusual stress, anger or loneliness in the last month? No   Who do you live with? Spouse   If you need help, do you have trouble finding someone available to you? No   Have you been bothered in the last four weeks by sexual problems? No   Do you have difficulty concentrating, remembering or making decisions? No       Does the patient have evidence of cognitive impairment? no    No opioid medication identified on active medication list. I have reviewed chart for  "other potential  high risk medication/s and harmful drug interactions in the elderly.          Does not need ASA (and currently is not on it)    Vitals:    01/03/25 1303   BP: 130/74   BP Location: Left arm   Patient Position: Sitting   Cuff Size: Adult   Pulse: 85   Temp: 97.8 °F (36.6 °C)   TempSrc: Infrared   SpO2: 97%   Weight: 79.7 kg (175 lb 9.6 oz)   Height: 170.2 cm (67.01\")   PainSc: 0-No pain       Body mass index is 27.5 kg/m².    Visual Acuity:  No results found.    Advanced Care Planning:  ACP discussion was held with the patient during this visit. Patient has an advance directive (not in EMR), copy requested.    Compared to one year ago, the patient feels physical health is the same.  Compared to one year ago, the patient feels mental health is the same.    Reviewed chart for potential of high risk medication in the elderly: yes  Reviewed chart for potential of harmful drug interactions in the elderly:yes    Identification of Risk Factors:  Risk factors include: Advance Directive Discussion  Breast Cancer/Mammogram Screening  Colon Cancer Screening  Diabetic Lab Screening   Glaucoma Risk  Immunizations Discussed/Encouraged (specific immunizations; Tdap, Hepatitis A Vaccine/Series, Influenza, Pneumococcal 23, Shingrix, COVID19, and RSV (Respiratory Syncytial Virus) )  Urinary Incontinence.    Patient Self-Management and Personalized Health Advice  The patient has been provided with information about: diet and exercise and preventive services including:   Annual Wellness Visit (AWV)  Bone Density Measurements  Screening Mammography   Screening Pap Tests.  Follow Up: Medicare visit in one year    Discussed the patient's BMI with her. The BMI is above average; BMI management plan is completed.    Patient has multiple medical problems which are significant and separately identifiable that require additional work above and beyond the Medicare Wellness Visit. These are not trivial or insignificant and are " "billed with a 25-modifier    Chief Complaint   Patient presents with    Medicare Wellness-subsequent     Review of medical issues.       HPI:  Radha Rivera is a 69 y.o. female RTC in yearly CPE/ AWV, review of medical issues:  Sick last week after Xmas with cold and HA issues. \"I have been in bed\" in last week, feels better and 'at end of it'. Some remnant congestion.    Laceration 11/2024 and seen in ED.  Stitches in L hand.  Healed well at this time.  Stitches taken out at home.     1.  GERD/ gastritis - 'baseline'.  Avoids meat as will trigger.  Still on PPI daily. Swallowing OK.    2. Asthma and Seasonal Allergies; long hx, adult onset only. S/P immunotherapy weekly ~ 1 year, self D/C as felt like not helpful.  On Singulair < daily and Xyzal or Claritin in season - Has been using albuterol through recent cold issues.  Not had to use until last week.     3.  Hx of Hypothyroidism, though (-) anti-TPO Ab - off levothyroxine for > 4  years now.  4. Hx of Carcinoma in situ of cervix - in 1977, s/p D & C, no recurrence. UTD Dr. Pablo ~11/2024  for annual Pap.  Thinks had Pap smear, but did not get a call on results.  5. Overweight, Hx IFG -diet overall good through the year, but admits not as good during the holiday season.  Does walk for exercise and has been consistent with that.  6.  Osteopenia, B hips-new Dx 4/2023 DEXA.  Mild osteopenia.  Patient has been compliant with exercise.  Is on vitamin D daily   7. HM -not had flu shot, plans to complete; not had RSV, but is curious about options for this.     Review of Systems   Constitutional: Positive for malaise/fatigue. Negative for chills, fever, weight gain and weight loss.   HENT:  Positive for congestion. Negative for hearing loss, odynophagia and sore throat.    Eyes:  Negative for discharge, double vision, pain and redness.        Last eye exam 2024; wears readers     Cardiovascular:  Negative for chest pain, dyspnea on exertion, irregular heartbeat, leg " swelling, near-syncope, palpitations and syncope.   Respiratory:  Positive for cough (wtih acute issues). Negative for shortness of breath.    Endocrine: Negative for polydipsia, polyphagia and polyuria.   Hematologic/Lymphatic: Negative for bleeding problem. Does not bruise/bleed easily.   Skin:  Negative for rash and suspicious lesions.   Musculoskeletal:  Negative for joint pain, joint swelling, muscle cramps, muscle weakness and myalgias.   Gastrointestinal:  Positive for heartburn (rare breatkthrough or missed med). Negative for constipation, diarrhea, dysphagia, nausea and vomiting.   Genitourinary:  Positive for bladder incontinence (wtih coughing, no pad needed.). Negative for dysuria, frequency, hematuria, hesitancy and incomplete emptying.   Neurological:  Negative for dizziness, headaches and light-headedness.   Psychiatric/Behavioral:  Negative for depression. The patient does not have insomnia and is not nervous/anxious.    Allergic/Immunologic: Positive for environmental allergies. Negative for persistent infections.       @OBJECTIVE BEGIN@  Vitals:    01/03/25 1303   BP: 130/74   Pulse: 85   Temp: 97.8 °F (36.6 °C)   SpO2: 97%     Physical Exam  Vitals reviewed.   Constitutional:       General: She is not in acute distress.     Appearance: Normal appearance. She is well-developed. She is not ill-appearing or toxic-appearing.   HENT:      Head: Normocephalic and atraumatic.      Right Ear: Hearing, tympanic membrane, ear canal and external ear normal. There is no impacted cerumen.      Left Ear: Hearing, tympanic membrane, ear canal and external ear normal. There is no impacted cerumen.      Nose: Nose normal.      Mouth/Throat:      Mouth: Mucous membranes are moist. No injury or oral lesions.      Dentition: Does not have dentures.      Tongue: No lesions.      Pharynx: Oropharynx is clear. Uvula midline. No pharyngeal swelling, oropharyngeal exudate, posterior oropharyngeal erythema or uvula  swelling.   Eyes:      General: Lids are normal. No scleral icterus.        Right eye: No discharge.         Left eye: No discharge.      Extraocular Movements: Extraocular movements intact.      Conjunctiva/sclera: Conjunctivae normal.      Pupils: Pupils are equal, round, and reactive to light.   Neck:      Thyroid: No thyroid mass or thyromegaly.      Vascular: No carotid bruit.      Trachea: Trachea normal.   Cardiovascular:      Rate and Rhythm: Normal rate and regular rhythm.      Pulses:           Radial pulses are 2+ on the right side and 2+ on the left side.        Dorsalis pedis pulses are 2+ on the right side and 2+ on the left side.        Posterior tibial pulses are 2+ on the right side and 2+ on the left side.      Heart sounds: Normal heart sounds, S1 normal and S2 normal. No murmur heard.     No friction rub. No gallop.   Pulmonary:      Effort: Pulmonary effort is normal. No tachypnea, accessory muscle usage or respiratory distress.      Breath sounds: Normal breath sounds. No decreased breath sounds, wheezing, rhonchi or rales.      Comments: Frequent cough during visit today.  Abdominal:      General: Bowel sounds are normal. There is no distension.      Palpations: Abdomen is soft. There is no mass.      Tenderness: There is no abdominal tenderness. There is no guarding or rebound.   Musculoskeletal:         General: Normal range of motion.      Right shoulder: No tenderness, bony tenderness or crepitus. Normal range of motion.      Left shoulder: No tenderness, bony tenderness or crepitus. Normal range of motion.      Right hand: No tenderness or bony tenderness. Normal range of motion. Normal strength.      Left hand: No tenderness or bony tenderness. Normal range of motion. Normal strength.      Cervical back: Full passive range of motion without pain. No rigidity. No muscular tenderness. Normal range of motion.      Right lower leg: No edema.      Left lower leg: No edema.      Right foot:  Normal range of motion. No deformity or bunion.      Left foot: Normal range of motion. No deformity or bunion.   Feet:      Right foot:      Skin integrity: No ulcer, blister or skin breakdown.      Left foot:      Skin integrity: No ulcer, blister or skin breakdown.   Lymphadenopathy:      Cervical: No cervical adenopathy.      Upper Body:      Right upper body: No supraclavicular, axillary or pectoral adenopathy.      Left upper body: No supraclavicular, axillary or pectoral adenopathy.      Lower Body: No right inguinal adenopathy. No left inguinal adenopathy.   Skin:     General: Skin is warm and dry.      Findings: No rash.   Neurological:      Mental Status: She is alert and oriented to person, place, and time.      Cranial Nerves: No cranial nerve deficit, dysarthria or facial asymmetry.      Sensory: No sensory deficit.      Motor: No weakness, tremor, atrophy or abnormal muscle tone.      Gait: Gait normal.      Deep Tendon Reflexes: Reflexes are normal and symmetric.      Reflex Scores:       Patellar reflexes are 2+ on the right side and 2+ on the left side.       Achilles reflexes are 2+ on the right side and 2+ on the left side.  Psychiatric:         Mood and Affect: Mood normal.         Behavior: Behavior normal.         Thought Content: Thought content normal.         Assessment & Plan   Diagnoses and all orders for this visit:    1. Encounter for general adult medical examination with abnormal findings (Primary)    2. Encounter for subsequent annual wellness visit (AWV) in Medicare patient    3. Acute bronchitis, unspecified organism  -     albuterol (ACCUNEB) 0.63 MG/3ML nebulizer solution; Take 3 mL by nebulization Every 6 (Six) Hours As Needed for Wheezing.  Dispense: 45 each; Refill: 2    4. Osteopenia of both hips  -     DEXA Bone Density Axial; Future    5. Screening for osteoporosis  -     DEXA Bone Density Axial; Future    6. Mild persistent asthma without complication  -     albuterol  (ACCUNEB) 0.63 MG/3ML nebulizer solution; Take 3 mL by nebulization Every 6 (Six) Hours As Needed for Wheezing.  Dispense: 45 each; Refill: 2    7. History of abnormal cervical Pap smear    8. Gastroesophageal reflux disease, unspecified whether esophagitis present    9. Overweight (BMI 25.0-29.9)    10. Fibromuscular dysplasia    11. Environmental and seasonal allergies          Diagnoses and all orders for this visit:    Encounter for general adult medical examination with abnormal findings    Encounter for subsequent annual wellness visit (AWV) in Medicare patient    Acute bronchitis, unspecified organism  -     albuterol (ACCUNEB) 0.63 MG/3ML nebulizer solution; Take 3 mL by nebulization Every 6 (Six) Hours As Needed for Wheezing.    Osteopenia of both hips  -     DEXA Bone Density Axial; Future    Screening for osteoporosis  -     DEXA Bone Density Axial; Future    Mild persistent asthma without complication  -     albuterol (ACCUNEB) 0.63 MG/3ML nebulizer solution; Take 3 mL by nebulization Every 6 (Six) Hours As Needed for Wheezing.    History of abnormal cervical Pap smear    Gastroesophageal reflux disease, unspecified whether esophagitis present    Overweight (BMI 25.0-29.9)    Fibromuscular dysplasia    Environmental and seasonal allergies    Other orders  -     Loratadine (Claritin) 10 MG capsule; Take  by mouth.          Radha YEMI Rivera is a 69 y.o. female RTC in yearly CPE/ AWV, review of medical issues:    1.  GERD/ gastritis, acceleration in 2022 of abdominal discomfort and heartburn noted every since lap maulik in 2011; EGD 9/2022 with mild gastritis only -controlled on PPI daily.  No red flag SX. Weight loss advised.   2. Seasonal Allergies; long hx, adult onset only. S/P immunotherapy weekly ~ 1 year, self D/C as felt like not helpful - controlled on Singulair < daily and Xyzal or Claritin in season  3. Asthma, mild intermittent at baseline - mild exacerbation symptomatically with recent acute  bronchitis/ sinusitis (suspect viral), using albuterol for SX control.  Exam notable for cough following today, no wheezing.  Refilled albuterol neb solution today for PRN use.  4.  Hx of Hypothyroidism, though (-) anti-TPO Ab - off levothyroxine for > 3  years now.  TSH remains normal.  5. Hx of Carcinoma in situ of cervix - in 1977, s/p D & C, no recurrence. UTD Dr. Pablo ~11/2024 for pelvic.  Reviewed with patient updated guidelines and notes she has completed> 25 years of screening post diagnosis and likely reasonable to D/C Pap smears at this time.  Will review with gynecology.  6. Overweight, Hx IFG -weight stable, compliant with exercise routine.  Some variability in diet with holiday season.  FBS and A1c remain normal today.  Trend   7.  Osteopenia, B hips, new Dx 4/2023 DEXA -  Mild osteopenia, tx not recommend at time. C/W exercise.  Trend DEXA 4/2025.  C/W Vit D daily.  8. HM - labs d/w pt; Flu/ RSV - at pharmacy; Pvax/ Prevnar/ Zostavax/ Tdap/ Shingrix/ COVID primary - UTD; COVID update declined; C-scope (-) 11/2016 --> 10 years; Pap UTD 10/2023 --> pelvic UTD 11/2024; Mammo (-) 3/2024;  DEXA due 4/2025, ordered; Hep C Ab (-) 9/2016; add back exercise; Preventative care plan provided to pt at end of visit    Return in about 1 year (around 1/3/2026) for Annual physical, Medicare Wellness.          Current Outpatient Medications:     albuterol sulfate  (90 Base) MCG/ACT inhaler, INHALE 2 PUFFS BY MOUTH EVERY 4 HOURS AS NEEDED FOR WHEEZING, Disp: 8.5 g, Rfl: 2    aspirin 81 MG EC tablet, Take 1 tablet by mouth Daily., Disp: 30 tablet, Rfl: 3    Cholecalciferol (VITAMIN D) 1000 UNITS tablet, Take 1 tablet by mouth Daily., Disp: , Rfl:     montelukast (SINGULAIR) 10 MG tablet, Take 1 tablet by mouth Every Night., Disp: 90 tablet, Rfl: 3    omeprazole (priLOSEC) 40 MG capsule, Take 1 capsule by mouth Daily., Disp: 90 capsule, Rfl: 2    albuterol (ACCUNEB) 0.63 MG/3ML nebulizer solution, Take 3 mL by  nebulization Every 6 (Six) Hours As Needed for Wheezing., Disp: 45 each, Rfl: 2    Loratadine (Claritin) 10 MG capsule, Take  by mouth., Disp: , Rfl:

## 2025-01-03 NOTE — PATIENT INSTRUCTIONS
Medicare Wellness  Personal Prevention Plan of Service     Date of Office Visit:    Encounter Provider:  Paolo Dc MD  Place of Service:  [facility]  Patient Name: Radha Rivera  :  1955    As part of the Medicare Wellness portion of your visit today, we are providing you with this personalized preventive plan of services (PPPS). This plan is based upon recommendations of the [place] Preventive Services Task Force (USPSTF) and the Advisory Committee on Immunization Practices (ACIP).    This lists the preventive care services that should be considered, and provides dates of when you are due. Items listed as completed are up-to-date and do not require any further intervention.    Health Maintenance   Topic Date Due    INFLUENZA VACCINE  2024    COVID-19 Vaccine (3 - 2024-25 season) 2024    MAMMOGRAM  2025    DXA SCAN  2025    ANNUAL WELLNESS VISIT  2026    BMI FOLLOWUP  2026    PAP SMEAR  10/23/2026    COLORECTAL CANCER SCREENING  2026    TDAP/TD VACCINES (3 - Td or Tdap) 2030    HEPATITIS C SCREENING  Completed    Pneumococcal Vaccine 65+  Completed       Orders Placed This Encounter   Procedures    DEXA Bone Density Axial     Standing Status:   Future     Standing Expiration Date:   1/3/2026     Order Specific Question:   Is patient taking or have taken long term Glucocorticoid (steroids)?     Answer:   No     Order Specific Question:   Does the patient have rheumatoid arthritis?     Answer:   No     Order Specific Question:   Does the patient have secondary osteoporosis?     Answer:   No     Order Specific Question:   Reason for Exam:     Answer:   Osteopenia     Order Specific Question:   Release to patient     Answer:   Routine Release [[id number]]       Return in about 1 year (around 1/3/2026) for Annual physical, Medicare Wellness.

## 2025-04-16 RX ORDER — MONTELUKAST SODIUM 10 MG/1
10 TABLET ORAL NIGHTLY
Qty: 90 TABLET | Refills: 3 | Status: SHIPPED | OUTPATIENT
Start: 2025-04-16

## 2025-04-16 RX ORDER — ALBUTEROL SULFATE 90 UG/1
2 INHALANT RESPIRATORY (INHALATION) EVERY 4 HOURS PRN
Qty: 8.5 G | Refills: 2 | Status: SHIPPED | OUTPATIENT
Start: 2025-04-16

## 2025-04-25 ENCOUNTER — HOSPITAL ENCOUNTER (OUTPATIENT)
Dept: MAMMOGRAPHY | Facility: HOSPITAL | Age: 70
Discharge: HOME | End: 2025-04-25
Admitting: OBSTETRICS & GYNECOLOGY
Payer: MEDICARE

## 2025-04-25 DIAGNOSIS — Z12.31 BREAST CANCER SCREENING BY MAMMOGRAM: ICD-10-CM

## 2025-04-25 PROCEDURE — 77063 BREAST TOMOSYNTHESIS BI: CPT

## 2025-04-25 PROCEDURE — 77067 SCR MAMMO BI INCL CAD: CPT

## (undated) DEVICE — STCKNT IMPERV 9X36IN STRL

## (undated) DEVICE — SUT VIC 3/0 CT2 27IN J232H

## (undated) DEVICE — GOWN,REINF,POLY,SIRUS,BREATH SLV,XLNG/XL: Brand: MEDLINE

## (undated) DEVICE — SUT ETHLN 3/0 PS1 18IN 1663H

## (undated) DEVICE — UNDERCAST PADDING: Brand: DEROYAL

## (undated) DEVICE — DRAPE,U/ SHT,SPLIT,PLAS,STERIL: Brand: MEDLINE

## (undated) DEVICE — GLV SURG SIGNATURE ESSENTIAL PF LTX SZ8

## (undated) DEVICE — GLV SURG PREMIERPRO ORTHO LTX PF SZ8 BRN

## (undated) DEVICE — BANDAGE,GAUZE,BULKEE II,4.5"X4.1YD,STRL: Brand: MEDLINE

## (undated) DEVICE — APPL CHLORAPREP HI/LITE 26ML ORNG

## (undated) DEVICE — TBG PENCL TELESCP MEGADYNE SMOKE EVAC 10FT

## (undated) DEVICE — PK ORTHO MINOR TOWER 40

## (undated) DEVICE — SOL ISO/ALC 70PCT 4OZ

## (undated) DEVICE — BNDG,ELSTC,MATRIX,STRL,4"X5YD,LF,HOOK&LP: Brand: MEDLINE

## (undated) DEVICE — GLV SURG BIOGEL LTX PF 8

## (undated) DEVICE — TRAP FLD MINIVAC MEGADYNE 100ML

## (undated) DEVICE — BANDAGE,GAUZE,CONFORMING,4"X75",STRL,LF: Brand: MEDLINE

## (undated) DEVICE — DRESSING,GAUZE,XEROFORM,CURAD,1"X8",ST: Brand: CURAD

## (undated) DEVICE — PATIENT RETURN ELECTRODE, SINGLE-USE, CONTACT QUALITY MONITORING, ADULT, WITH 9FT CORD, FOR PATIENTS WEIGING OVER 33LBS. (15KG): Brand: MEGADYNE

## (undated) DEVICE — ANTIBACTERIAL UNDYED BRAIDED (POLYGLACTIN 910), SYNTHETIC ABSORBABLE SUTURE: Brand: COATED VICRYL